# Patient Record
Sex: MALE | Race: WHITE | NOT HISPANIC OR LATINO | Employment: PART TIME | URBAN - METROPOLITAN AREA
[De-identification: names, ages, dates, MRNs, and addresses within clinical notes are randomized per-mention and may not be internally consistent; named-entity substitution may affect disease eponyms.]

---

## 2017-05-16 DIAGNOSIS — Z11.59 NEED FOR HEPATITIS C SCREENING TEST: Primary | ICD-10-CM

## 2017-05-17 ENCOUNTER — DOCUMENTATION ONLY (OUTPATIENT)
Dept: FAMILY MEDICINE | Facility: CLINIC | Age: 57
End: 2017-05-17

## 2017-05-17 NOTE — PROGRESS NOTES
Pre-Visit Chart Review  For Appointment Scheduled on 5/17/17.    Health Maintenance Due   Topic Date Due    Hepatitis C Screening  1960    TETANUS VACCINE  04/23/1977    Colonoscopy  04/23/2009    Lipid Panel  01/31/2014

## 2017-05-18 ENCOUNTER — OFFICE VISIT (OUTPATIENT)
Dept: FAMILY MEDICINE | Facility: CLINIC | Age: 57
End: 2017-05-18
Payer: MEDICAID

## 2017-05-18 ENCOUNTER — LAB VISIT (OUTPATIENT)
Dept: LAB | Facility: HOSPITAL | Age: 57
End: 2017-05-18
Attending: FAMILY MEDICINE
Payer: MEDICAID

## 2017-05-18 VITALS
SYSTOLIC BLOOD PRESSURE: 126 MMHG | RESPIRATION RATE: 18 BRPM | HEART RATE: 55 BPM | WEIGHT: 185.88 LBS | DIASTOLIC BLOOD PRESSURE: 79 MMHG | HEIGHT: 66 IN | TEMPERATURE: 98 F | BODY MASS INDEX: 29.87 KG/M2

## 2017-05-18 DIAGNOSIS — Z29.9 PREVENTIVE MEASURE: ICD-10-CM

## 2017-05-18 DIAGNOSIS — Z11.4 ENCOUNTER FOR SCREENING FOR HIV: ICD-10-CM

## 2017-05-18 DIAGNOSIS — I10 ESSENTIAL HYPERTENSION: ICD-10-CM

## 2017-05-18 DIAGNOSIS — E78.5 HYPERLIPIDEMIA, UNSPECIFIED HYPERLIPIDEMIA TYPE: ICD-10-CM

## 2017-05-18 DIAGNOSIS — I51.9 LV DYSFUNCTION: ICD-10-CM

## 2017-05-18 DIAGNOSIS — I25.10 CORONARY ARTERY DISEASE, ANGINA PRESENCE UNSPECIFIED, UNSPECIFIED VESSEL OR LESION TYPE, UNSPECIFIED WHETHER NATIVE OR TRANSPLANTED HEART: ICD-10-CM

## 2017-05-18 DIAGNOSIS — H61.21 RIGHT EAR IMPACTED CERUMEN: ICD-10-CM

## 2017-05-18 DIAGNOSIS — I25.10 CORONARY ARTERY DISEASE, ANGINA PRESENCE UNSPECIFIED, UNSPECIFIED VESSEL OR LESION TYPE, UNSPECIFIED WHETHER NATIVE OR TRANSPLANTED HEART: Primary | ICD-10-CM

## 2017-05-18 DIAGNOSIS — E78.5 HYPERLIPIDEMIA WITH TARGET LOW DENSITY LIPOPROTEIN (LDL) CHOLESTEROL LESS THAN 100 MG/DL: ICD-10-CM

## 2017-05-18 DIAGNOSIS — E66.9 OBESITY, CLASS I, BMI 30-34.9: ICD-10-CM

## 2017-05-18 DIAGNOSIS — Z11.59 NEED FOR HEPATITIS C SCREENING TEST: ICD-10-CM

## 2017-05-18 LAB
25(OH)D3+25(OH)D2 SERPL-MCNC: 16 NG/ML
ALBUMIN SERPL BCP-MCNC: 3.8 G/DL
ALP SERPL-CCNC: 61 U/L
ALT SERPL W/O P-5'-P-CCNC: 29 U/L
ANION GAP SERPL CALC-SCNC: 9 MMOL/L
AST SERPL-CCNC: 20 U/L
BASOPHILS # BLD AUTO: 0.05 K/UL
BASOPHILS NFR BLD: 0.6 %
BILIRUB SERPL-MCNC: 0.2 MG/DL
BUN SERPL-MCNC: 14 MG/DL
CALCIUM SERPL-MCNC: 9.4 MG/DL
CHLORIDE SERPL-SCNC: 103 MMOL/L
CHOLEST/HDLC SERPL: 10.8 {RATIO}
CO2 SERPL-SCNC: 26 MMOL/L
CREAT SERPL-MCNC: 0.8 MG/DL
DIFFERENTIAL METHOD: NORMAL
EOSINOPHIL # BLD AUTO: 0.5 K/UL
EOSINOPHIL NFR BLD: 6.1 %
ERYTHROCYTE [DISTWIDTH] IN BLOOD BY AUTOMATED COUNT: 13.9 %
EST. GFR  (AFRICAN AMERICAN): >60 ML/MIN/1.73 M^2
EST. GFR  (NON AFRICAN AMERICAN): >60 ML/MIN/1.73 M^2
GLUCOSE SERPL-MCNC: 109 MG/DL
HCT VFR BLD AUTO: 50 %
HCV AB SERPL QL IA: POSITIVE
HCV AB SERPL QL IA: POSITIVE
HDL/CHOLESTEROL RATIO: 9.3 %
HDLC SERPL-MCNC: 26 MG/DL
HDLC SERPL-MCNC: 281 MG/DL
HGB BLD-MCNC: 16.5 G/DL
HIV 1+2 AB+HIV1 P24 AG SERPL QL IA: NEGATIVE
LDLC SERPL CALC-MCNC: ABNORMAL MG/DL
LYMPHOCYTES # BLD AUTO: 2.8 K/UL
LYMPHOCYTES NFR BLD: 35 %
MCH RBC QN AUTO: 29 PG
MCHC RBC AUTO-ENTMCNC: 33 %
MCV RBC AUTO: 88 FL
MONOCYTES # BLD AUTO: 0.7 K/UL
MONOCYTES NFR BLD: 8.4 %
NEUTROPHILS # BLD AUTO: 3.9 K/UL
NEUTROPHILS NFR BLD: 49.6 %
NONHDLC SERPL-MCNC: 255 MG/DL
PLATELET # BLD AUTO: 284 K/UL
PMV BLD AUTO: 10.2 FL
POTASSIUM SERPL-SCNC: 4.5 MMOL/L
PROT SERPL-MCNC: 7.6 G/DL
RBC # BLD AUTO: 5.69 M/UL
SODIUM SERPL-SCNC: 138 MMOL/L
TRIGL SERPL-MCNC: 904 MG/DL
TSH SERPL DL<=0.005 MIU/L-ACNC: 1.49 UIU/ML
WBC # BLD AUTO: 7.89 K/UL

## 2017-05-18 PROCEDURE — 83036 HEMOGLOBIN GLYCOSYLATED A1C: CPT

## 2017-05-18 PROCEDURE — 82306 VITAMIN D 25 HYDROXY: CPT

## 2017-05-18 PROCEDURE — 80061 LIPID PANEL: CPT

## 2017-05-18 PROCEDURE — 93005 ELECTROCARDIOGRAM TRACING: CPT | Mod: PBBFAC,PO | Performed by: FAMILY MEDICINE

## 2017-05-18 PROCEDURE — 85025 COMPLETE CBC W/AUTO DIFF WBC: CPT

## 2017-05-18 PROCEDURE — 80053 COMPREHEN METABOLIC PANEL: CPT

## 2017-05-18 PROCEDURE — 36415 COLL VENOUS BLD VENIPUNCTURE: CPT | Mod: PO

## 2017-05-18 PROCEDURE — 93010 ELECTROCARDIOGRAM REPORT: CPT | Mod: ,,, | Performed by: INTERNAL MEDICINE

## 2017-05-18 PROCEDURE — 86592 SYPHILIS TEST NON-TREP QUAL: CPT

## 2017-05-18 PROCEDURE — 69210 REMOVE IMPACTED EAR WAX UNI: CPT | Mod: S$PBB,,, | Performed by: FAMILY MEDICINE

## 2017-05-18 PROCEDURE — 86803 HEPATITIS C AB TEST: CPT

## 2017-05-18 PROCEDURE — 84443 ASSAY THYROID STIM HORMONE: CPT

## 2017-05-18 PROCEDURE — 99999 PR PBB SHADOW E&M-EST. PATIENT-LVL III: CPT | Mod: PBBFAC,,, | Performed by: FAMILY MEDICINE

## 2017-05-18 PROCEDURE — 99204 OFFICE O/P NEW MOD 45 MIN: CPT | Mod: 25,S$PBB,, | Performed by: FAMILY MEDICINE

## 2017-05-18 PROCEDURE — 86703 HIV-1/HIV-2 1 RESULT ANTBDY: CPT

## 2017-05-18 RX ORDER — POTASSIUM CHLORIDE 1.5 G/1.58G
20 POWDER, FOR SOLUTION ORAL ONCE
COMMUNITY
End: 2017-06-06

## 2017-05-18 RX ORDER — METOPROLOL SUCCINATE 25 MG/1
25 TABLET, EXTENDED RELEASE ORAL DAILY
COMMUNITY
End: 2017-06-06

## 2017-05-18 RX ORDER — LOVASTATIN 20 MG/1
40 TABLET ORAL NIGHTLY
Qty: 180 TABLET | Refills: 3 | Status: SHIPPED | OUTPATIENT
Start: 2017-05-18 | End: 2017-10-02 | Stop reason: ALTCHOICE

## 2017-05-18 RX ORDER — PREDNISONE 10 MG/1
10 TABLET ORAL DAILY
COMMUNITY
End: 2017-06-06

## 2017-05-18 RX ORDER — LISINOPRIL 5 MG/1
5 TABLET ORAL DAILY
Qty: 90 TABLET | Refills: 3 | Status: SHIPPED | OUTPATIENT
Start: 2017-05-18 | End: 2018-05-16 | Stop reason: SDUPTHER

## 2017-05-18 RX ORDER — FUROSEMIDE 40 MG/1
40 TABLET ORAL DAILY
COMMUNITY
End: 2017-06-06

## 2017-05-18 NOTE — MR AVS SNAPSHOT
[facility]  [address]  [place] [address]  Phone: [phone]  Fax: [phone]                  Kal Dodd   2017 9:00 AM   Office Visit    Description:  Male : 1960   Provider:  Aaron Rios MD   Department:  [facility]           Reason for Visit     Establish Care           Diagnoses this Visit        Comments    Coronary artery disease, angina presence unspecified, unspecified vessel or lesion type, unspecified whether native or transplanted heart    -  Primary     Essential hypertension         Hyperlipidemia, unspecified hyperlipidemia type         Hyperlipidemia with target low density lipoprotein (LDL) cholesterol less than 100 mg/dL         Obesity, Class I, BMI 30-34.9         LV dysfunction         Preventive measure         Encounter for screening for HIV                To Do List           Future Appointments        Provider Department Dept Phone    2017 9:45 AM [facility] - [facility] [phone]    2017 10:20 AM Aaron Rios MD [facility] Medicine [phone]    10/2/2017 3:40 PM Keven White MD [facility] - Cardiology [phone]      Goals (5 Years of Data)     None       These Medications        Disp Refills Start End    lovastatin (MEVACOR) 20 MG tablet 180 tablet 3 2017    Take 2 tablets (40 mg total) by mouth every evening. - Oral    Pharmacy: [facility] [address] [place] [address]. Ph #: [phone]       lisinopril (PRINIVIL,ZESTRIL) 5 MG tablet 90 tablet 3 2017    Take 1 tablet (5 mg total) by mouth once daily. - Oral    Pharmacy: [facility] [address] [place] [address]. Ph #: [phone]         [facility]     [facility] Nurse Care Line - 24/ Assistance  Unless otherwise directed by your provider, please contact [facility], our nurse care line that is available for 24/7 assistance.      Registered nurses in the [facility] provide: appointment scheduling, clinical advisement, health education, and other advisory services.  Call: [phone] (toll free)               Medications           Message regarding Medications     Verify the changes and/or additions to your medication regime listed below are the same as discussed with your clinician today.  If any of these changes or additions are incorrect, please notify your healthcare provider.        START taking these NEW medications        Refills    lovastatin (MEVACOR) 20 MG tablet 3    Sig: Take 2 tablets (40 mg total) by mouth every evening.    Class: Normal    Route: Oral    lisinopril (PRINIVIL,ZESTRIL) 5 MG tablet 3    Sig: Take 1 tablet (5 mg total) by mouth once daily.    Class: Normal    Route: Oral      STOP taking these medications     hydrocodone-acetaminophen 10-325mg (NORCO)  mg Tab Take 1 tablet by mouth 3 (three) times daily as needed.    atorvastatin (LIPITOR) 80 MG tablet Take 1 tablet (80 mg total) by mouth once daily.    prasugrel (EFFIENT) 10 mg Tab Take 1 tablet (10 mg total) by mouth once daily.           Verify that the below list of medications is an accurate representation of the medications you are currently taking.  If none reported, the list may be blank. If incorrect, please contact your healthcare provider. Carry this list with you in case of emergency.           Current Medications     aspirin (ECOTRIN) 81 MG EC tablet Take 81 mg by mouth once daily.      furosemide (LASIX) 40 MG tablet Take 40 mg by mouth once daily.    metoprolol succinate (TOPROL-XL) 25 MG 24 hr tablet Take 25 mg by mouth once daily.    potassium chloride (KLOR-CON) 20 mEq Pack Take 20 mEq by mouth once.    predniSONE (DELTASONE) 10 MG tablet Take 10 mg by mouth once daily.    lisinopril (PRINIVIL,ZESTRIL) 5 MG tablet Take 1 tablet (5 mg total) by mouth once daily.    lovastatin (MEVACOR) 20 MG tablet Take 2 tablets (40 mg  "total) by mouth every evening.           Clinical Reference Information           Your Vitals Were     BP Pulse Temp Resp Height Weight    126/79 (BP Location: Right arm, Patient Position: Sitting, BP Method: Automatic) 55 97.7 °F (36.5 °C) (Oral) 18 5' 6" (1.676 m) 84.3 kg (185 lb 13.6 oz)    BMI                30 kg/m2          Blood Pressure          Most Recent Value    BP  126/79      Allergies as of 5/18/2017     No Known Allergies      Immunizations Administered on Date of Encounter - 5/18/2017     None      Orders Placed During Today's Visit      Normal Orders This Visit    Ambulatory referral to Cardiology     IN OFFICE EKG 12-LEAD (to [facility])     Future Labs/Procedures Expected by Expires    CBC auto differential  5/18/2017 7/17/2018    Comprehensive metabolic panel  5/18/2017 7/17/2018    Hemoglobin A1c  5/18/2017 7/17/2018    Hepatitis C antibody  5/18/2017 7/17/2018    HIV-1 and HIV-2 antibodies  5/18/2017 7/17/2018    Lipid panel  5/18/2017 7/17/2018    RPR  5/18/2017 7/17/2018    TSH  5/18/2017 7/17/2018    Vitamin D  5/18/2017 7/17/2018      Smoking Cessation     If you would like to quit smoking:   You may be eligible for free services if you are a [place] resident and started smoking cigarettes before September 1, 1988.  Call the Smoking Cessation Trust ([facility]) toll free at [phone]) [phone] or (897) [phone].   Call 1-800-QUIT-NOW if you do not meet the above criteria.   Contact us via email: [email or web address]   View our website for more information: [email or web address]        Language Assistance Services     ATTENTION: Language assistance services are available, free of charge. Please call [phone].      ATENCIÓN: Si habla fang, tiene a beach disposición servicios gratuitos de asistencia lingüística. Llame al [phone].     CHÚ Ý: N?u b?n nói Ti?ng Vi?t, có các d?ch v? h? tr? ngôn ng? mi?n phí dành cho b?n. G?i s? [phone].         [facility] complies " with applicable Federal civil rights laws and does not discriminate on the basis of race, color, national origin, age, disability, or sex.

## 2017-05-18 NOTE — PROGRESS NOTES
"[facility]  [facility] Note    Subjective:       Patient ID: Kal Dodd is a 57 y.o. male.    Chief Complaint: Establish Care    HPI57 y.o.male with current medical history of hypertension, hyperlipidemia, congestive heart failure, history of MI and stent placement, and coronary artery disease is here today to establish care.  Patient has not seen a primary care doctor in over 4 years.  Patient has lost follow with cardiologist in [place].  Patient has been taking medications that she has been prescribed from the emergency room.  Patient would like to reestablish himself and get back on the right medications.  No acute complaints at today's visit.  Review of Systems   Constitutional: Negative for chills, fatigue and fever.   HENT: Negative for congestion, ear pain, postnasal drip, sinus pressure, sneezing and sore throat.    Eyes: Negative for pain.   Respiratory: Negative for cough, shortness of breath and wheezing.    Cardiovascular: Negative for chest pain, palpitations and leg swelling.   Gastrointestinal: Negative for abdominal distention, abdominal pain, constipation, diarrhea, nausea and vomiting.   Genitourinary: Negative for difficulty urinating.   Musculoskeletal: Negative for back pain and myalgias.   Skin: Negative for rash.   Neurological: Negative for dizziness, seizures, syncope, weakness and numbness.   Psychiatric/Behavioral: Negative for sleep disturbance. The patient is not nervous/anxious.        Objective:      Vitals:    05/18/17 0850   BP: 126/79   BP Location: Right arm   Patient Position: Sitting   BP Method: Automatic   Pulse: (!) 55   Resp: 18   Temp: 97.7 °F (36.5 °C)   TempSrc: Oral   Weight: 84.3 kg (185 lb 13.6 oz)   Height: 5' 6" (1.676 m)  Comment: height verified     Body mass index is 30 kg/(m^2).  Physical Exam   Constitutional: He is oriented to person, place, and time. He appears well-developed and well-nourished.   HENT:   Head: Normocephalic and atraumatic. "   Right ear cerumen impaction   Eyes: Conjunctivae and EOM are normal. Pupils are equal, round, and reactive to light.   Neck: Normal range of motion. Neck supple. No JVD present.   Cardiovascular: Normal rate, regular rhythm, normal heart sounds and intact distal pulses.  Exam reveals no gallop and no friction rub.    No murmur heard.  Pulmonary/Chest: Effort normal and breath sounds normal. No respiratory distress. He has no wheezes.   Abdominal: Soft. Bowel sounds are normal. There is no tenderness.   Musculoskeletal: Normal range of motion.   Neurological: He is alert and oriented to person, place, and time. No cranial nerve deficit.   Skin: Skin is warm and dry.   Psychiatric: He has a normal mood and affect. His behavior is normal. Judgment and thought content normal.   Nursing note and vitals reviewed.      Assessment:       1. Coronary artery disease, angina presence unspecified, unspecified vessel or lesion type, unspecified whether native or transplanted heart    2. Essential hypertension    3. Hyperlipidemia, unspecified hyperlipidemia type    4. Hyperlipidemia with target low density lipoprotein (LDL) cholesterol less than 100 mg/dL    5. Obesity, Class I, BMI 30-34.9    6. LV dysfunction, EF 40%    7. Preventive measure    8. Encounter for screening for HIV        Plan:       Coronary artery disease, angina presence unspecified, unspecified vessel or lesion type, unspecified whether native or transplanted heart  -     CBC auto differential; Future; Expected date: 5/18/17  -     Vitamin D; Future; Expected date: 5/18/17  -     Ambulatory referral to Cardiology    Essential hypertension  -     Comprehensive metabolic panel; Future; Expected date: 5/18/17  -     Hemoglobin A1c; Future; Expected date: 5/18/17  -     TSH; Future; Expected date: 5/18/17  -     IN OFFICE EKG 12-LEAD (to [facility])  -     lisinopril (PRINIVIL,ZESTRIL) 5 MG tablet; Take 1 tablet (5 mg total) by mouth once daily.  Dispense: 90 tablet;  Refill: 3    Hyperlipidemia, unspecified hyperlipidemia type  -     lovastatin (MEVACOR) 20 MG tablet; Take 2 tablets (40 mg total) by mouth every evening.  Dispense: 180 tablet; Refill: 3    Hyperlipidemia with target low density lipoprotein (LDL) cholesterol less than 100 mg/dL  -     Lipid panel; Future; Expected date: 5/18/17    Obesity, Class I, BMI 30-34.9        - Patient educated on diet and exercise     LV dysfunction, EF 40%        -  Stable continue to monitor     Preventive measure  -     Hepatitis C antibody; Future; Expected date: 5/18/17  -     RPR; Future; Expected date: 5/18/17    Encounter for screening for HIV  -     HIV-1 and HIV-2 antibodies; Future; Expected date: 5/18/17    Right ear cerumen impaction        - Water irrigation with hydrogen peroxide was performed on each ear followed up removal of ear wax manually with curette patient tolerated procedure well without any complications     Patient readiness: acceptance and barriers:none    During the course of the visit the patient was educated and counseled about the following:     Diabetes:  Discussed general issues about diabetes pathophysiology and management.  Educational material distributed.  Addressed ADA diet.  Suggested low cholesterol diet.  Encouraged aerobic exercise.  Discussed foot care.  Hypertension:   Dietary sodium restriction.  Regular aerobic exercise.  Check blood pressures daily and record.  Obesity:   General weight loss/lifestyle modification strategies discussed (elicit support from others; identify saboteurs; non-food rewards, etc).  Informal exercise measures discussed, e.g. taking stairs instead of elevator.  Regular aerobic exercise program discussed.    Goals: Diabetes: Maintain Hemoglobin A1C below 7, Hypertension: Reduce Blood Pressure and Obesity: Reduce calorie intake and BMI    Did patient meet goals/outcomes: Yes    The following self management tools provided: blood pressure log  blood glucose  log  excercise log    Patient Instructions (the written plan) was given to the patient/family.     Time spent with patient: 45 minutes    Return in about 1 year (around 5/18/2018).  Aaron Rios MD  [facility]  5/18/2017 9:42 AM

## 2017-05-19 LAB
ESTIMATED AVG GLUCOSE: 131 MG/DL
HBA1C MFR BLD HPLC: 6.2 %
RPR SER QL: NORMAL

## 2017-05-30 ENCOUNTER — DOCUMENTATION ONLY (OUTPATIENT)
Dept: FAMILY MEDICINE | Facility: CLINIC | Age: 57
End: 2017-05-30

## 2017-05-30 NOTE — PROGRESS NOTES
Pre-Visit Chart Review  For Appointment Scheduled on 06/06/2017    Health Maintenance Due   Topic Date Due    TETANUS VACCINE  04/23/1977    Colonoscopy  04/23/2009

## 2017-06-06 ENCOUNTER — OFFICE VISIT (OUTPATIENT)
Dept: FAMILY MEDICINE | Facility: CLINIC | Age: 57
End: 2017-06-06
Payer: MEDICAID

## 2017-06-06 ENCOUNTER — LAB VISIT (OUTPATIENT)
Dept: LAB | Facility: HOSPITAL | Age: 57
End: 2017-06-06
Attending: FAMILY MEDICINE
Payer: MEDICAID

## 2017-06-06 VITALS
WEIGHT: 181.69 LBS | RESPIRATION RATE: 18 BRPM | SYSTOLIC BLOOD PRESSURE: 142 MMHG | DIASTOLIC BLOOD PRESSURE: 78 MMHG | TEMPERATURE: 98 F | BODY MASS INDEX: 29.2 KG/M2 | HEART RATE: 57 BPM | HEIGHT: 66 IN

## 2017-06-06 DIAGNOSIS — R73.03 PREDIABETES: ICD-10-CM

## 2017-06-06 DIAGNOSIS — B19.20 HEPATITIS C VIRUS INFECTION WITHOUT HEPATIC COMA, UNSPECIFIED CHRONICITY: ICD-10-CM

## 2017-06-06 DIAGNOSIS — E78.1 HYPERTRIGLYCERIDEMIA, ESSENTIAL: ICD-10-CM

## 2017-06-06 DIAGNOSIS — E55.9 VITAMIN D DEFICIENCY: ICD-10-CM

## 2017-06-06 DIAGNOSIS — I51.9 LV DYSFUNCTION: Primary | ICD-10-CM

## 2017-06-06 PROCEDURE — 36415 COLL VENOUS BLD VENIPUNCTURE: CPT | Mod: PO

## 2017-06-06 PROCEDURE — 87522 HEPATITIS C REVRS TRNSCRPJ: CPT

## 2017-06-06 PROCEDURE — 99214 OFFICE O/P EST MOD 30 MIN: CPT | Mod: S$PBB,,, | Performed by: FAMILY MEDICINE

## 2017-06-06 PROCEDURE — 99999 PR PBB SHADOW E&M-EST. PATIENT-LVL IV: CPT | Mod: PBBFAC,,, | Performed by: FAMILY MEDICINE

## 2017-06-06 RX ORDER — FUROSEMIDE 20 MG/1
20 TABLET ORAL DAILY
Qty: 90 TABLET | Refills: 3 | Status: SHIPPED | OUTPATIENT
Start: 2017-06-06 | End: 2018-07-05 | Stop reason: SDUPTHER

## 2017-06-06 RX ORDER — ERGOCALCIFEROL 1.25 MG/1
50000 CAPSULE ORAL
Qty: 8 CAPSULE | Refills: 0 | Status: SHIPPED | OUTPATIENT
Start: 2017-06-06 | End: 2017-08-07 | Stop reason: SDUPTHER

## 2017-06-06 RX ORDER — FENOFIBRATE 160 MG/1
160 TABLET ORAL DAILY
Qty: 90 TABLET | Refills: 3 | Status: SHIPPED | OUTPATIENT
Start: 2017-06-06 | End: 2018-07-05 | Stop reason: SDUPTHER

## 2017-06-06 RX ORDER — METOPROLOL TARTRATE 25 MG/1
25 TABLET, FILM COATED ORAL 2 TIMES DAILY
Qty: 180 TABLET | Refills: 3 | Status: SHIPPED | OUTPATIENT
Start: 2017-06-06 | End: 2017-10-02 | Stop reason: ALTCHOICE

## 2017-06-06 NOTE — PROGRESS NOTES
[facility]  Progress Note    Subjective:       Patient ID: Kal Dodd is a 57 y.o. male.    Chief Complaint: CHF follow up    HPI57 y.o.male     Mr Dodd is a 58yo male with a PMH of CHF, heroin abuse, Hyperlipidemia, and hypertension presenting for a 3 week follow up for CHF management. Patient has only been taking statin and ace inhibitor since previous visit and has been nonadherent to prescriptions for furosemide and metoprolol. Patient complains of neck pain 4/10 today. Patient also had heroin addiction in part, and he expresses interest in saboxone prescription. He has been taking saboxone from friends and family. Patient has no further complaints at todays visit.      Review of Systems   Constitutional: Negative for activity change, appetite change, chills, fatigue, fever and unexpected weight change.   HENT: Negative for congestion, ear pain, postnasal drip, sinus pressure, sneezing and sore throat.    Eyes: Negative for pain and visual disturbance.   Respiratory: Negative for cough, shortness of breath and wheezing.    Cardiovascular: Negative for chest pain, palpitations and leg swelling.   Gastrointestinal: Negative for abdominal distention, abdominal pain, blood in stool, constipation, diarrhea, nausea and vomiting.   Endocrine: Negative for polydipsia and polyuria.   Genitourinary: Negative for difficulty urinating, dysuria and hematuria.   Musculoskeletal: Positive for neck pain. Negative for back pain and myalgias.   Skin: Negative for pallor and rash.   Neurological: Negative for dizziness, seizures, syncope, weakness and numbness.   Psychiatric/Behavioral: Negative for sleep disturbance. The patient is not nervous/anxious.        Objective:      Vitals:    06/06/17 1029 06/06/17 1033   BP: (!) 142/83 (!) 142/78   BP Location: Right arm Left arm   Patient Position: Sitting Sitting   BP Method: Automatic Automatic   Pulse: (!) 57    Resp: 18    Temp: 98 °F (36.7 °C)    TempSrc:  "Oral    Weight: 82.4 kg (181 lb 10.5 oz)    Height: 5' 6" (1.676 m)      Body mass index is 29.32 kg/m².  Physical Exam   Constitutional: He is oriented to person, place, and time. He appears well-developed and well-nourished.   HENT:   Head: Normocephalic and atraumatic.   Right Ear: External ear normal.   Left Ear: External ear normal.   Eyes: Conjunctivae and EOM are normal. Pupils are equal, round, and reactive to light. No scleral icterus.   Neck: Normal range of motion. Neck supple. No JVD present.   Cardiovascular: Normal rate, regular rhythm, normal heart sounds and intact distal pulses.  Exam reveals no gallop and no friction rub.    No murmur heard.  Pulmonary/Chest: Effort normal and breath sounds normal. No respiratory distress. He has no wheezes.   Abdominal: Soft. Bowel sounds are normal. There is no tenderness.   Musculoskeletal: Normal range of motion. He exhibits no edema, tenderness or deformity.   Neurological: He is alert and oriented to person, place, and time. No cranial nerve deficit.   Skin: Skin is warm and dry. No rash noted.   Psychiatric: He has a normal mood and affect. His behavior is normal. Judgment and thought content normal.   Nursing note and vitals reviewed.      Assessment:       1. LV dysfunction, EF 40%    2. Hepatitis C virus infection without hepatic coma, unspecified chronicity    3. Hypertriglyceridemia, essential    4. Vitamin D deficiency    5. Prediabetes        Plan:       LV dysfunction, EF 40%  -     2D Echo w/ Color Flow Doppler & Bubble Contrast; Future  -     metoprolol tartrate (LOPRESSOR) 25 MG tablet; Take 1 tablet (25 mg total) by mouth 2 (two) times daily.  Dispense: 180 tablet; Refill: 3  -     furosemide (LASIX) 20 MG tablet; Take 1 tablet (20 mg total) by mouth once daily.  Dispense: 90 tablet; Refill: 3  -     Ambulatory referral to Cardiology    Hepatitis C virus infection without hepatic coma, unspecified chronicity  -     HEPATITIS C RNA, QUANTITATIVE, " PCR; Future; Expected date: 06/06/2017  -     HEPATITIS C GENOTYPE; Future; Expected date: 06/06/2017  -     Ambulatory Referral to Hepatology    Hypertriglyceridemia, essential  -     fenofibrate 160 MG Tab; Take 1 tablet (160 mg total) by mouth once daily.  Dispense: 90 tablet; Refill: 3    Vitamin D deficiency  -     ergocalciferol (ERGOCALCIFEROL) 50,000 unit Cap; Take 1 capsule (50,000 Units total) by mouth every 7 days.  Dispense: 8 capsule; Refill: 0    Prediabetes        - Patient educated on diet and exercise       Patient readiness: acceptance and barriers:none    During the course of the visit the patient was educated and counseled about the following:     Hypertension:   Medication: no change.    Goals: Hypertension: Reduce Blood Pressure    Did patient meet goals/outcomes: Yes    The following self management tools provided: declined    Patient Instructions (the written plan) was given to the patient/family.     Time spent with patient: 30 minutes        Return in about 3 months (around 9/6/2017).  Aaron Rios MD  [facility]  6/6/2017 11:26 AM

## 2017-06-08 ENCOUNTER — TELEPHONE (OUTPATIENT)
Dept: HEPATOLOGY | Facility: CLINIC | Age: 57
End: 2017-06-08

## 2017-06-08 LAB
HCV GENTYP SERPL NAA+PROBE: NORMAL
HCV QUALITATIVE RESULT: NOT DETECTED
HCV QUANTITATIVE LOG: <1.08 LOG (10) IU/ML
HCV RNA SPEC NAA+PROBE-ACNC: <12 IU/ML

## 2017-06-08 NOTE — TELEPHONE ENCOUNTER
Chart reviewed. Positive HCV antibody on 5/18/17. HCV genotype done 6/6, result is pending.    Since active virus needs to be confirmed, will wait for pending labs before scheduling consult.   Left message to inform pt. Will call him back once results are available.

## 2017-06-16 ENCOUNTER — HOSPITAL ENCOUNTER (OUTPATIENT)
Dept: CARDIOLOGY | Facility: HOSPITAL | Age: 57
Discharge: HOME | End: 2017-06-16
Attending: FAMILY MEDICINE
Payer: MEDICAID

## 2017-06-16 DIAGNOSIS — I51.9 LV DYSFUNCTION: ICD-10-CM

## 2017-06-16 LAB
MITRAL VALVE REGURGITATION: ABNORMAL
RETIRED EF AND QEF - SEE NOTES: 32 (ref 55–65)
TRICUSPID VALVE REGURGITATION: ABNORMAL

## 2017-06-16 PROCEDURE — 63600175 PHARM REV CODE 636 W HCPCS: Performed by: INTERNAL MEDICINE

## 2017-06-16 PROCEDURE — 96374 THER/PROPH/DIAG INJ IV PUSH: CPT

## 2017-06-16 PROCEDURE — 93306 TTE W/DOPPLER COMPLETE: CPT | Mod: 26,,, | Performed by: INTERNAL MEDICINE

## 2017-06-22 ENCOUNTER — DOCUMENTATION ONLY (OUTPATIENT)
Dept: FAMILY MEDICINE | Facility: CLINIC | Age: 57
End: 2017-06-22

## 2017-06-22 NOTE — PROGRESS NOTES
Health Maintenance Due   Topic Date Due    TETANUS VACCINE  04/23/1977    Colonoscopy  04/23/2009

## 2017-07-25 ENCOUNTER — DOCUMENTATION ONLY (OUTPATIENT)
Dept: FAMILY MEDICINE | Facility: CLINIC | Age: 57
End: 2017-07-25

## 2017-07-25 NOTE — PROGRESS NOTES
Pre-Visit Chart Review  For Appointment Scheduled on 8/4/17.    Health Maintenance Due   Topic Date Due    TETANUS VACCINE  04/23/1977    Colonoscopy  04/23/2009

## 2017-08-04 ENCOUNTER — OFFICE VISIT (OUTPATIENT)
Dept: FAMILY MEDICINE | Facility: CLINIC | Age: 57
End: 2017-08-04
Payer: MEDICAID

## 2017-08-04 ENCOUNTER — TELEPHONE (OUTPATIENT)
Dept: FAMILY MEDICINE | Facility: CLINIC | Age: 57
End: 2017-08-04

## 2017-08-04 VITALS
TEMPERATURE: 98 F | SYSTOLIC BLOOD PRESSURE: 105 MMHG | OXYGEN SATURATION: 99 % | DIASTOLIC BLOOD PRESSURE: 63 MMHG | WEIGHT: 182.13 LBS | BODY MASS INDEX: 29.27 KG/M2 | HEART RATE: 53 BPM | HEIGHT: 66 IN

## 2017-08-04 DIAGNOSIS — R73.03 PREDIABETES: ICD-10-CM

## 2017-08-04 DIAGNOSIS — I50.22 CHRONIC SYSTOLIC CONGESTIVE HEART FAILURE: ICD-10-CM

## 2017-08-04 DIAGNOSIS — I25.10 CORONARY ARTERY DISEASE, ANGINA PRESENCE UNSPECIFIED, UNSPECIFIED VESSEL OR LESION TYPE, UNSPECIFIED WHETHER NATIVE OR TRANSPLANTED HEART: Primary | ICD-10-CM

## 2017-08-04 DIAGNOSIS — E78.5 HYPERLIPIDEMIA WITH TARGET LOW DENSITY LIPOPROTEIN (LDL) CHOLESTEROL LESS THAN 100 MG/DL: ICD-10-CM

## 2017-08-04 DIAGNOSIS — Z12.11 COLON CANCER SCREENING: ICD-10-CM

## 2017-08-04 DIAGNOSIS — I10 ESSENTIAL HYPERTENSION: ICD-10-CM

## 2017-08-04 PROCEDURE — 99999 PR PBB SHADOW E&M-EST. PATIENT-LVL III: CPT | Mod: PBBFAC,,, | Performed by: FAMILY MEDICINE

## 2017-08-04 PROCEDURE — 99214 OFFICE O/P EST MOD 30 MIN: CPT | Mod: S$PBB,,, | Performed by: FAMILY MEDICINE

## 2017-08-04 PROCEDURE — 3008F BODY MASS INDEX DOCD: CPT | Mod: ,,, | Performed by: FAMILY MEDICINE

## 2017-08-04 PROCEDURE — 99213 OFFICE O/P EST LOW 20 MIN: CPT | Mod: PBBFAC,PO | Performed by: FAMILY MEDICINE

## 2017-08-04 NOTE — TELEPHONE ENCOUNTER
Patient is scheduled to see  10/2017.  would like patient seen sooner by  or practitioner with sooner availablity. Please let us know so we can contact patient. Thanks!

## 2017-08-04 NOTE — PROGRESS NOTES
"[facility]  Progress Note    Subjective:       Patient ID: Kal Dodd is a 57 y.o. male.    Chief Complaint: hepatitis c test    HPI57 y.o.male is here today to follow-up abnormal labs.  Patient had positive hepatitis C antibody.  Viral load was not detected.  Patient was still concerned due to positive hepatitis C antibody.  Patient educated on hepatitis C testing.  No further workup required.  The patient has a history of congestive heart failure, hypertension, prediabetes, hyperlipidemia, hypertriglyceridemia, and vitamin D deficiency.  Patient has ejection fraction of 32%.  Patient had been noncompliant with medications due to cost.  Patient has been restarted on cost-effective medications on [employer] $5 plan.  Patient has not been established with cardiology.  No further complaints at today's visit.  Review of Systems   Constitutional: Negative for chills, fatigue and fever.   HENT: Negative for congestion, ear pain, postnasal drip, sinus pressure, sneezing and sore throat.    Eyes: Negative for pain.   Respiratory: Negative for cough, shortness of breath and wheezing.    Cardiovascular: Negative for chest pain, palpitations and leg swelling.   Gastrointestinal: Negative for abdominal distention, abdominal pain, constipation, diarrhea, nausea and vomiting.   Genitourinary: Negative for difficulty urinating.   Musculoskeletal: Negative for back pain and myalgias.   Skin: Negative for rash.   Neurological: Negative for dizziness, seizures, syncope, weakness and numbness.   Psychiatric/Behavioral: Negative for sleep disturbance. The patient is not nervous/anxious.        Objective:      Vitals:    08/04/17 1330   BP: 105/63   BP Location: Right arm   Patient Position: Sitting   BP Method: Automatic   Pulse: (!) 53   Temp: 98.2 °F (36.8 °C)   TempSrc: Oral   SpO2: 99%   Weight: 82.6 kg (182 lb 1.6 oz)   Height: 5' 6" (1.676 m)     Body mass index is 29.39 kg/m².  Physical Exam   Constitutional: He " is oriented to person, place, and time. He appears well-developed and well-nourished. No distress.   HENT:   Head: Normocephalic and atraumatic.   Cardiovascular: Normal rate, regular rhythm, normal heart sounds and intact distal pulses.  Exam reveals no gallop and no friction rub.    No murmur heard.  Pulmonary/Chest: Effort normal and breath sounds normal. No respiratory distress. He has no rales.   Abdominal: Soft. He exhibits no distension and no mass. There is no rebound and no guarding. No hernia.   Musculoskeletal: Normal range of motion.   Neurological: He is alert and oriented to person, place, and time.   Skin: Skin is warm.   Psychiatric: He has a normal mood and affect. His behavior is normal. Judgment and thought content normal.   Vitals reviewed.      Assessment:       1. Coronary artery disease, angina presence unspecified, unspecified vessel or lesion type, unspecified whether native or transplanted heart    2. Chronic systolic congestive heart failure    3. Colon cancer screening    4. Hyperlipidemia with target low density lipoprotein (LDL) cholesterol less than 100 mg/dL    5. Prediabetes    6. Essential hypertension        Plan:       Coronary artery disease, angina presence unspecified, unspecified vessel or lesion type, unspecified whether native or transplanted heart        -  Stable continue to monitor         - Follow up with cardiology     Chronic systolic congestive heart failure  -     Ambulatory referral to Cardiology  - Continue current medications     Colon cancer screening  -     Occult Blood Stool, CA Screen; Future; Expected date: 08/04/2017  -     Occult Blood Stool, CA Screen; Future; Expected date: 08/04/2017  -     Occult Blood Stool, CA Screen; Future; Expected date: 08/04/2017    Hyperlipidemia with target low density lipoprotein (LDL) cholesterol less than 100 mg/dL        - Continue fibrate and statin     Prediabetes        - Follow up HA1C in 3 months     Positive Hepatitis  C antibody        - hepatitis viral load not detected       - No need for further workup       - Continue to monitor   HTN        - Well controlled continue current medications    Patient readiness: acceptance and barriers:none    During the course of the visit the patient was educated and counseled about the following:     Hypertension:   Dietary sodium restriction.  Regular aerobic exercise.  Check blood pressures daily and record.  Obesity:   General weight loss/lifestyle modification strategies discussed (elicit support from others; identify saboteurs; non-food rewards, etc).    Goals: Hypertension: Reduce Blood Pressure and Obesity: Reduce calorie intake and BMI    Did patient meet goals/outcomes: Yes    The following self management tools provided: blood pressure log  excercise log    Patient Instructions (the written plan) was given to the patient/family.     Time spent with patient: 30 minutes    Return in about 3 months (around 11/4/2017).  Aaron Rios MD  [facility]  8/4/2017 2:02 PM

## 2017-08-07 DIAGNOSIS — E55.9 VITAMIN D DEFICIENCY: ICD-10-CM

## 2017-08-07 RX ORDER — ERGOCALCIFEROL 1.25 MG/1
CAPSULE ORAL
Qty: 8 CAPSULE | Refills: 0 | Status: SHIPPED | OUTPATIENT
Start: 2017-08-07 | End: 2017-11-10

## 2017-08-07 NOTE — TELEPHONE ENCOUNTER
Tried to schedule pt. Due to pts insurance, the next available with Dr Stevens is farther out than the currently scheduled Dr White appointment.   I have left pt scheduled as is with Dr White.

## 2017-10-02 ENCOUNTER — OFFICE VISIT (OUTPATIENT)
Dept: CARDIOLOGY | Facility: CLINIC | Age: 57
End: 2017-10-02
Payer: MEDICAID

## 2017-10-02 VITALS
HEIGHT: 66 IN | WEIGHT: 185.44 LBS | SYSTOLIC BLOOD PRESSURE: 124 MMHG | HEART RATE: 57 BPM | DIASTOLIC BLOOD PRESSURE: 74 MMHG | OXYGEN SATURATION: 98 % | BODY MASS INDEX: 29.8 KG/M2

## 2017-10-02 DIAGNOSIS — I51.9 LV DYSFUNCTION: ICD-10-CM

## 2017-10-02 DIAGNOSIS — I50.22 CHRONIC SYSTOLIC CONGESTIVE HEART FAILURE: Primary | ICD-10-CM

## 2017-10-02 DIAGNOSIS — I10 ESSENTIAL HYPERTENSION: ICD-10-CM

## 2017-10-02 DIAGNOSIS — E78.5 HYPERLIPIDEMIA WITH TARGET LOW DENSITY LIPOPROTEIN (LDL) CHOLESTEROL LESS THAN 100 MG/DL: ICD-10-CM

## 2017-10-02 DIAGNOSIS — F17.200 SMOKER: ICD-10-CM

## 2017-10-02 DIAGNOSIS — I25.10 CORONARY ARTERY DISEASE INVOLVING NATIVE CORONARY ARTERY OF NATIVE HEART WITHOUT ANGINA PECTORIS: ICD-10-CM

## 2017-10-02 DIAGNOSIS — I50.20 NYHA CLASS 2 HEART FAILURE WITH REDUCED EJECTION FRACTION: ICD-10-CM

## 2017-10-02 PROCEDURE — 99213 OFFICE O/P EST LOW 20 MIN: CPT | Mod: PBBFAC,PO | Performed by: INTERNAL MEDICINE

## 2017-10-02 PROCEDURE — 99999 PR PBB SHADOW E&M-EST. PATIENT-LVL III: CPT | Mod: PBBFAC,,, | Performed by: INTERNAL MEDICINE

## 2017-10-02 PROCEDURE — 99204 OFFICE O/P NEW MOD 45 MIN: CPT | Mod: S$PBB,,, | Performed by: INTERNAL MEDICINE

## 2017-10-02 RX ORDER — ATORVASTATIN CALCIUM 20 MG/1
20 TABLET, FILM COATED ORAL DAILY
Qty: 90 TABLET | Refills: 3 | Status: SHIPPED | OUTPATIENT
Start: 2017-10-02 | End: 2017-11-10

## 2017-10-02 RX ORDER — METOPROLOL SUCCINATE 25 MG/1
25 TABLET, EXTENDED RELEASE ORAL DAILY
Qty: 30 TABLET | Refills: 11 | Status: SHIPPED | OUTPATIENT
Start: 2017-10-02 | End: 2018-07-05 | Stop reason: SDUPTHER

## 2017-10-02 NOTE — PROGRESS NOTES
"[facility]    CC:   Chief Complaint   Patient presents with    Consult     Consult Dr Rios//[facility]//Former Dami pt LOV 01/2013       Patient ID: Kal Ddod is a 57 y.o. male with a past medical history of CAD s/p MI and JAVON to LAD (2012), Ischemic cardiomyopathy (EF 39%), HTN, HLD, smoking, who presents for an initial appointment.  Pertinent history/events are as follows:     -Followed by Dr. Lomax (2013) and Dr. Dr. Antony (2012) in [place], [place] in the past.  -Had massive anterior MI in 4/29/2012, [facility] with CPR, IABP, then 5 ION stents to the LAD.  -Per old chart review: [place] in 4/2012 and [facility] on 11/8/2012- Echo showed anteroapical hypokinesia with LVEF of 45% on 5/3/2012.  -Last echo on     HPI:  Mr. Dodd states he's been doing well.  He has no chest pain, LE edema, palpitations, TIA symptoms or syncope.  States he has SOB, which is chronic and unchanged.  Currently smokes 1 pack of cigarettes daily.  He reports not taking any medications from late 2013 up until May 2017 because he lost his medicaid.  Has been taking all medications as prescribed since May 2017.  Blood pressure 124/74 today.      Past Medical History:   Diagnosis Date    Coronary artery disease 4/26/12    "massive heart attack"    Heart attack     Hyperlipidemia     Hypertension      Past Surgical History:   Procedure Laterality Date    CORONARY STENT PLACEMENT  4/12     Social History     Social History    Marital status:      Spouse name: N/A    Number of children: N/A    Years of education: N/A     Occupational History    Not on file.     Social History Main Topics    Smoking status: Current Every Day Smoker     Packs/day: 1.00    Smokeless tobacco: Never Used    Alcohol use No    Drug use: No    Sexual activity: Not Currently     Other Topics Concern    Not on file     Social History Narrative    No narrative on file     Family History " "  Problem Relation Age of Onset    Hypertension Mother     Diabetes Mother     Diabetes Sister     Diabetes Daughter       at 20    Hypertension Maternal Aunt     Heart disease Maternal Uncle     Cancer Neg Hx        Review of patient's allergies indicates:  No Known Allergies    Medication List with Changes/Refills   Current Medications    ASPIRIN (ECOTRIN) 81 MG EC TABLET    Take 81 mg by mouth once daily.      BUPRENORPHINE-NALOXONE (SUBOXONE) 8-2 MG FILM    Place under the tongue once daily.    FENOFIBRATE 160 MG TAB    Take 1 tablet (160 mg total) by mouth once daily.    FUROSEMIDE (LASIX) 20 MG TABLET    Take 1 tablet (20 mg total) by mouth once daily.    LISINOPRIL (PRINIVIL,ZESTRIL) 5 MG TABLET    Take 1 tablet (5 mg total) by mouth once daily.    LOVASTATIN (MEVACOR) 20 MG TABLET    Take 2 tablets (40 mg total) by mouth every evening.    METOPROLOL TARTRATE (LOPRESSOR) 25 MG TABLET    Take 1 tablet (25 mg total) by mouth 2 (two) times daily.    VITAMIN D2 50,000 UNIT CAPSULE    TAKE ONE CAPSULE BY MOUTH EVERY 7 DAYS       Review of Systems  Constitution: Denies chills, fever, and sweats.  HENT: Denies headaches or blurry vision.  Cardiovascular: Denies chest pain or irregular heart beat.  Respiratory: Denies cough or shortness of breath.  Gastrointestinal: Denies abdominal pain, nausea, or vomiting.  Musculoskeletal: Denies muscle cramps.  Neurological: Denies dizziness or focal weakness.  Psychiatric/Behavioral: Normal mental status.  Hematologic/Lymphatic: Denies bleeding problem or easy bruising/bleeding.  Skin: Denies rash or suspicious lesions    Physical Examination  /74 (BP Location: Left arm, Patient Position: Sitting)   Pulse (!) 57   Ht 5' 6" (1.676 m)   Wt 84.1 kg (185 lb 6.5 oz)   SpO2 98%   BMI 29.93 kg/m²     Constitutional: No acute distress, conversant  HEENT: Sclera anicteric, Pupils equal, round and reactive to light, extraocular motions intact, Oropharynx " clear  Neck: No JVD, no carotid bruits  Cardiovascular: regular rate and rhythm, no murmur, rubs or gallops, normal S1/S2  Pulmonary: Clear to auscultation bilaterally  Abdominal: Abdomen soft, nontender, nondistended, positive bowel sounds  Extremities: No lower extremity edema,   Pulses:  Carotid pulses are 2+ on the right side, and 2+ on the left side.  Radial pulses are 2+ on the right side, and 2+ on the left side.   Femoral pulses are 2+ on the right side, and 2+ on the left side.  Skin: No ecchymosis, erythema, or ulcers  Psych: Alert and oriented x 3, appropriate affect  Neuro: CNII-XII intact, no focal deficits    Labs:  Most Recent Data  CBC:   Lab Results   Component Value Date    WBC 7.89 05/18/2017    HGB 16.5 05/18/2017    HCT 50.0 05/18/2017     05/18/2017    MCV 88 05/18/2017    RDW 13.9 05/18/2017     BMP:   Lab Results   Component Value Date     05/18/2017    K 4.5 05/18/2017     05/18/2017    CO2 26 05/18/2017    BUN 14 05/18/2017    CREATININE 0.8 05/18/2017     05/18/2017    CALCIUM 9.4 05/18/2017     LFTS;   Lab Results   Component Value Date    PROT 7.6 05/18/2017    ALBUMIN 3.8 05/18/2017    BILITOT 0.2 05/18/2017    AST 20 05/18/2017    ALKPHOS 61 05/18/2017    ALT 29 05/18/2017     COAGS: No results found for: INR, PROTIME, PTT  FLP:   Lab Results   Component Value Date    CHOL 281 (H) 05/18/2017    HDL 26 (L) 05/18/2017    LDLCALC Invalid, Trig>400.0 05/18/2017    TRIG 904 (H) 05/18/2017    CHOLHDL 9.3 (L) 05/18/2017       EKG 5/18/2017:  Sinus bradycardia  Cannot rule out Inferior infarct (cited on or before 18-MAY-2017)  Anterolateral infarct (cited on or before 18-MAY-2017)    Echo 6/16/2017:  CONCLUSIONS     1 - Moderately depressed left ventricular systolic function (EF 30-35%).     2 - Indeterminate LV diastolic function.     3 - Normal right ventricular systolic function .     4 - Very difficult windows, Optison contrast used for wall motion analysis..      5 - Evidence for prior transmural MI involving the mid-anterior, anterior apical and apical segments with akinetic thinned walls .    Holter 12/12/2012:  PREDOMINANT RHYTHM                                                           1. Sinus rhythm with heart rates varying between 46 and 102 bpm with an      average of 74 bpm.                                                                                                                                        VENTRICULAR ARRHYTHMIAS                                                      1. There were very rare PVCs totalling 28 and averaging 1 per hour.                                                                                       2. There were no episodes of ventricular tachycardia.                                                                                                     SUPRA VENTRICULAR ARRHYTHMIAS                                                1. There were very rare PACs recorded totalling 3 and averaging less than    1 per hour.                                                                                                                                               2. There were no episodes of sustained supraventricular tachycardia.                                                                                      SINUS NODE FUNCTION                                                          1. There was no evidence of high grade SA maryjane block.                                                                                                    AV CONDUCTION                                                                1. There was no evidence of high grade AV block.      Assessment/Plan:  Kal Dodd is a 57 y.o. male with a past medical history of CAD s/p MI and JAVON to LAD (2012), Ischemic cardiomyopathy (EF 39%), HTN, HLD, smoking, who presents for an initial appointment.     1. CAD s/p MI and JAVON to LAD (2012)- No symptoms  currently.  On statin, ASA, ACEI, beta blocker.  Continue to monitor.    2. Ischemic cardiomyopathy (EF 39%)- Pt is well compensated on exam.  NYHA class 2-3 symptoms.  Discontinue metoprolol tartrate and start metoprolol succinate 25 mg daily.  Echo prior to next visit.  Plan to start spironolactone at next visit.      3. HTN- Controlled.    4. HLD- Triglycerides 904.  Continue fenofibrate.  Discontinue lovastatin and start atorvastatin 20 mg daily.      Follow up in 1 month with lipids prior    Total duration of face to face visit time 40 minutes.  Total time spent counseling greater than fifty percent of total visit time.  Counseling included discussion regarding imaging findings, diagnosis, possibilities, treatment options, risks and benefits.  The patient had many questions regarding the options and long-term effects.    Keven White MD, PhD  Interventional Cardiology

## 2017-10-02 NOTE — LETTER
October 2, 2017      Aaron Rios MD  [address]  [place] [address]           [place] [facility] Cardiology  [address]  [place] [address]  Phone: [phone]          Patient: Kal Dodd   MR Number: [id number]   YOB: 1960   Date of Visit: 10/2/2017       Dear Dr. Aaron Rios:    Thank you for referring Kal Dodd to me for evaluation. Attached you will find relevant portions of my assessment and plan of care.    If you have questions, please do not hesitate to call me. I look forward to following Kal Dodd along with you.    Sincerely,    Keven White MD    Enclosure  CC:  No Recipients    If you would like to receive this communication electronically, please contact [email or web address] or [phone] to request more information on [employer] access.    For providers and/or their staff who would like to refer a patient to [facility], please contact us through our one-stop-shop provider referral line, [facility], at [phone].    If you feel you have received this communication in error or would no longer like to receive these types of communications, please e-mail [email or web address]

## 2017-10-02 NOTE — PATIENT INSTRUCTIONS
Assessment/Plan:  Kal Dodd is a 57 y.o. male with a past medical history of CAD s/p MI and JAVON to LAD (2012), Ischemic cardiomyopathy (EF 39%), HTN, HLD, smoking, who presents for an initial appointment.     1. CAD s/p MI and JAVON to LAD (2012)- No symptoms currently.  On statin, ASA, ACEI, beta blocker.  Continue to monitor.    2. Ischemic cardiomyopathy (EF 39%)- Pt is well compensated on exam.  NYHA class 2-3 symptoms.  Discontinue metoprolol tartrate and start metoprolol succinate 25 mg daily.  Echo prior to next visit.  Plan to start spironolactone at next visit.      3. HTN- Controlled.    4. HLD- Triglycerides 904.  Continue fenofibrate.  Discontinue lovastatin and start atorvastatin 20 mg daily.      Follow up in 1 month

## 2017-11-03 ENCOUNTER — DOCUMENTATION ONLY (OUTPATIENT)
Dept: FAMILY MEDICINE | Facility: CLINIC | Age: 57
End: 2017-11-03

## 2017-11-03 NOTE — PROGRESS NOTES
Pre-Visit Chart Review  For Appointment Scheduled on 11/10/17.    Health Maintenance Due   Topic Date Due    TETANUS VACCINE  04/23/1977    Colonoscopy  04/23/2009    Influenza Vaccine  08/01/2017

## 2017-11-07 DIAGNOSIS — Z12.11 COLON CANCER SCREENING: Primary | ICD-10-CM

## 2017-11-10 ENCOUNTER — OFFICE VISIT (OUTPATIENT)
Dept: FAMILY MEDICINE | Facility: CLINIC | Age: 57
End: 2017-11-10
Payer: MEDICAID

## 2017-11-10 VITALS
TEMPERATURE: 98 F | HEART RATE: 65 BPM | SYSTOLIC BLOOD PRESSURE: 115 MMHG | HEIGHT: 66 IN | RESPIRATION RATE: 18 BRPM | DIASTOLIC BLOOD PRESSURE: 72 MMHG | BODY MASS INDEX: 30.62 KG/M2 | WEIGHT: 190.5 LBS

## 2017-11-10 DIAGNOSIS — I10 ESSENTIAL HYPERTENSION: ICD-10-CM

## 2017-11-10 DIAGNOSIS — R73.03 PREDIABETES: Primary | ICD-10-CM

## 2017-11-10 DIAGNOSIS — E55.9 VITAMIN D DEFICIENCY: ICD-10-CM

## 2017-11-10 PROCEDURE — 99999 PR PBB SHADOW E&M-EST. PATIENT-LVL III: CPT | Mod: PBBFAC,,, | Performed by: FAMILY MEDICINE

## 2017-11-10 PROCEDURE — 99214 OFFICE O/P EST MOD 30 MIN: CPT | Mod: S$PBB,,, | Performed by: FAMILY MEDICINE

## 2017-11-10 PROCEDURE — 99213 OFFICE O/P EST LOW 20 MIN: CPT | Mod: PBBFAC,PO,25 | Performed by: FAMILY MEDICINE

## 2017-11-10 PROCEDURE — 90686 IIV4 VACC NO PRSV 0.5 ML IM: CPT | Mod: PBBFAC,PO

## 2017-11-10 RX ORDER — LOVASTATIN 20 MG/1
20 TABLET ORAL 2 TIMES DAILY
COMMUNITY
End: 2018-07-05 | Stop reason: SDUPTHER

## 2017-11-10 NOTE — PROGRESS NOTES
"[facility]  Progress Note    Subjective:       Patient ID: Kal Dodd is a 57 y.o. male.    Chief Complaint: PRE DM follow up     HPI57 y.o.male is here today for three-month follow-up visit.  Patient is due for repeat hemoglobin A1c and vitamin D.  Patient is due for flu vaccination today.  Patient is agreeable to complete fit kit for colon cancer screening.  Patient does not have any acute complaints at today's visit.  Review of Systems   Constitutional: Negative for chills, fatigue and fever.   HENT: Negative for congestion, ear pain, postnasal drip, sinus pressure, sneezing and sore throat.    Eyes: Negative for pain.   Respiratory: Negative for cough, shortness of breath and wheezing.    Cardiovascular: Negative for chest pain, palpitations and leg swelling.   Gastrointestinal: Negative for abdominal distention, abdominal pain, constipation, diarrhea, nausea and vomiting.   Genitourinary: Negative for difficulty urinating.   Musculoskeletal: Negative for back pain and myalgias.   Skin: Negative for rash.   Neurological: Negative for dizziness, seizures, syncope, weakness and numbness.   Psychiatric/Behavioral: Negative for sleep disturbance. The patient is not nervous/anxious.        Objective:      Vitals:    11/10/17 1341   BP: 115/72   BP Location: Right arm   Patient Position: Sitting   BP Method: Medium (Automatic)   Pulse: 65   Resp: 18   Temp: 97.5 °F (36.4 °C)   TempSrc: Oral   Weight: 86.4 kg (190 lb 7.6 oz)   Height: 5' 6" (1.676 m)     Body mass index is 30.74 kg/m².  Physical Exam   Constitutional: He is oriented to person, place, and time. He appears well-developed and well-nourished. No distress.   HENT:   Head: Normocephalic and atraumatic.   Cardiovascular: Normal rate, regular rhythm, normal heart sounds and intact distal pulses.  Exam reveals no gallop and no friction rub.    No murmur heard.  Pulmonary/Chest: Effort normal and breath sounds normal. No respiratory distress. He " has no rales.   Abdominal: Soft. He exhibits no distension and no mass. There is no rebound and no guarding. No hernia.   Musculoskeletal: Normal range of motion.   Neurological: He is alert and oriented to person, place, and time.   Skin: Skin is warm.   Psychiatric: He has a normal mood and affect. His behavior is normal. Judgment and thought content normal.   Vitals reviewed.      Assessment:       1. Prediabetes    2. Vitamin D deficiency    3. Essential hypertension        Plan:       Prediabetes  -     Hemoglobin A1c; Future; Expected date: 11/10/2017    Vitamin D deficiency  -     Vitamin D; Future; Expected date: 11/10/2017    Essential hypertension        - Well controlled continue current medications      Return in about 3 months (around 2/10/2018).  Aaron Rios MD  [facility]  11/10/2017 2:13 PM

## 2017-11-10 NOTE — PROGRESS NOTES
ID patient by name and date of birth.  Allergies confirmed.  Immunization given as per orders , using aseptic technique.  Patient tolerated well,2/10 pain scale; no bleeding at insertion site; Information sheet reviewed and given to patient; no adverse reactions noted.

## 2017-12-08 ENCOUNTER — TELEPHONE (OUTPATIENT)
Dept: CARDIOLOGY | Facility: CLINIC | Age: 57
End: 2017-12-08

## 2017-12-08 NOTE — TELEPHONE ENCOUNTER
Called pt and offered several options for reschedule due to Dr White no longer seeing pts at this office. Pt chose to transfer care to Dr Stevens. Pt verbalized OK with date and time. Letter also mailed out. No further issues discussed.

## 2018-02-05 DIAGNOSIS — I10 HYPERTENSION, UNSPECIFIED TYPE: Primary | ICD-10-CM

## 2018-02-12 ENCOUNTER — OFFICE VISIT (OUTPATIENT)
Dept: FAMILY MEDICINE | Facility: CLINIC | Age: 58
End: 2018-02-12
Payer: MEDICAID

## 2018-02-12 VITALS
DIASTOLIC BLOOD PRESSURE: 73 MMHG | HEART RATE: 56 BPM | BODY MASS INDEX: 30.65 KG/M2 | WEIGHT: 190.69 LBS | HEIGHT: 66 IN | SYSTOLIC BLOOD PRESSURE: 130 MMHG | TEMPERATURE: 97 F

## 2018-02-12 DIAGNOSIS — I25.10 CORONARY ARTERY DISEASE INVOLVING NATIVE CORONARY ARTERY OF NATIVE HEART WITHOUT ANGINA PECTORIS: Primary | ICD-10-CM

## 2018-02-12 DIAGNOSIS — E78.5 HYPERLIPIDEMIA WITH TARGET LOW DENSITY LIPOPROTEIN (LDL) CHOLESTEROL LESS THAN 100 MG/DL: ICD-10-CM

## 2018-02-12 DIAGNOSIS — F33.0 MILD EPISODE OF RECURRENT MAJOR DEPRESSIVE DISORDER: ICD-10-CM

## 2018-02-12 PROCEDURE — 99999 PR PBB SHADOW E&M-EST. PATIENT-LVL III: CPT | Mod: PBBFAC,,, | Performed by: FAMILY MEDICINE

## 2018-02-12 PROCEDURE — 3008F BODY MASS INDEX DOCD: CPT | Mod: ,,, | Performed by: FAMILY MEDICINE

## 2018-02-12 PROCEDURE — 99213 OFFICE O/P EST LOW 20 MIN: CPT | Mod: PBBFAC,PO | Performed by: FAMILY MEDICINE

## 2018-02-12 PROCEDURE — 99214 OFFICE O/P EST MOD 30 MIN: CPT | Mod: S$PBB,,, | Performed by: FAMILY MEDICINE

## 2018-02-12 RX ORDER — DULOXETIN HYDROCHLORIDE 30 MG/1
30 CAPSULE, DELAYED RELEASE ORAL DAILY
Qty: 30 CAPSULE | Refills: 1 | Status: SHIPPED | OUTPATIENT
Start: 2018-02-12 | End: 2018-09-28 | Stop reason: SDUPTHER

## 2018-02-12 RX ORDER — NITROGLYCERIN 0.4 MG/1
0.4 TABLET SUBLINGUAL EVERY 5 MIN PRN
Qty: 30 TABLET | Refills: 1 | Status: SHIPPED | OUTPATIENT
Start: 2018-02-12 | End: 2019-05-31 | Stop reason: SDUPTHER

## 2018-02-12 NOTE — PATIENT INSTRUCTIONS
Established High Blood Pressure    High blood pressure (hypertension) is a chronic disease. Often, healthcare providers dont know what causes it. But it can be caused by certain health conditions and medicines.  If you have high blood pressure, you may not have any symptoms. If you do have symptoms, they may include headache, dizziness, changes in your vision, chest pain, and shortness of breath. But even without symptoms, high blood pressure thats not treated raises your risk for heart attack and stroke. High blood pressure is a serious health risk and shouldnt be ignored.  A blood pressure reading is made up of two numbers: a higher number over a lower number. The top number is the systolic pressure. The bottom number is the diastolic pressure. A normal blood pressure is a systolic pressure of  less than 120 over a diastolic pressure of less than 80. You will see your blood pressure readings written together. For example, a person with a systolic pressure of 188 and a diastolic pressure of 78 will have 118/78 written in the medical record.  High blood pressure is when either the top number is 140 or higher, or the bottom number is 90 or higher. This must be the result when taking your blood pressure a number of times. The blood pressures between normal and high are called prehypertension.  Home care  If you have high blood pressure, you should do what is listed below to lower your blood pressure. If you are taking medicines for high blood pressure, these methods may reduce or end your need for medicines in the future.  · Begin a weight-loss program if you are overweight.  · Cut back on how much salt you get in your diet. Heres how to do this:  ¨ Dont eat foods that have a lot of salt. These include olives, pickles, smoked meats, and salted potato chips.  ¨ Dont add salt to your food at the table.  ¨ Use only small amounts of salt when cooking.  · Start an exercise program. Talk with your healthcare  provider about the type of exercise program that would be best for you. It doesn't have to be hard. Even brisk walking for 20 minutes 3 times a week is a good form of exercise.  · Dont take medicines that stimulate the heart. This includes many over-the-counter cold and sinus decongestant pills and sprays, as well as diet pills. Check the warnings about hypertension on the label. Before buying any over-the-counter medicines or supplements, always ask the pharmacist about the product's potential interaction with your high blood pressure and your high blood pressure medicines.  · Stimulants such as amphetamine or cocaine could be deadly for someone with high blood pressure. Never take these.  · Limit how much caffeine you get in your diet. Switch to caffeine-free products.  · Stop smoking. If you are a long-time smoker, this can be hard. Talk to your healthcare provider about medicines and nicotine replacement options to help you. Also, enroll in a stop-smoking program to make it more likely that you will quit for good.  · Learn how to handle stress. This is an important part of any program to lower blood pressure. Learn about relaxation methods like meditation, yoga, or biofeedback.  · If your provider prescribed medicines, take them exactly as directed. Missing doses may cause your blood pressure get out of control.  · If you miss a dose or doses, check with your healthcare provider or pharmacist about what to do.  · Consider buying an automatic blood pressure machine. Ask your provider for a recommendation. You can get one of these at most pharmacies.     The American Heart Association recommends the following guidelines for home blood pressure monitoring:  · Don't smoke or drink coffee for 30 minutes before taking your blood pressure.  · Go to the bathroom before the test.  · Relax for 5 minutes before taking the measurement.  · Sit with your back supported (don't sit on a couch or soft chair); keep your feet on  the floor uncrossed. Place your arm on a solid flat surface (like a table) with the upper part of the arm at heart level. Place the middle of the cuff directly above the eye of the elbow. Check the monitor's instruction manual for an illustration.  · Take multiple readings. When you measure, take 2 to 3 readings one minute apart and record all of the results.  · Take your blood pressure at the same time every day, or as your healthcare provider recommends.  · Record the date, time, and blood pressure reading.  · Take the record with you to your next medical appointment. If your blood pressure monitor has a built-in memory, simply take the monitor with you to your next appointment.  · Call your provider if you have several high readings. Don't be frightened by a single high blood pressure reading, but if you get several high readings, check in with your healthcare provider.  · Note: When blood pressure reaches a systolic (top number) of 180 or higher OR diastolic (bottom number) of 110 or higher, seek emergency medical treatment.  Follow-up care  You will need to see your healthcare provider regularly. This is to check your blood pressure and to make changes to your medicines. Make a follow-up appointment as directed. Bring the record of your home blood pressure readings to the appointment.  When to seek medical advice  Call your healthcare provider right away if any of these occur:  · Blood pressure reaches a systolic (upper number) of 180 or higher OR a diastolic (bottom number) of 110 or higher  · Chest pain or shortness of breath  · Severe headache  · Throbbing or rushing sound in the ears  · Nosebleed  · Sudden severe pain in your belly (abdomen)  · Extreme drowsiness, confusion, or fainting  · Dizziness or spinning sensation (vertigo)  · Weakness of an arm or leg or one side of the face  · You have problems speaking or seeing   Date Last Reviewed: 12/1/2016  © 7473-3919 [employer]. [address] [place] [address]  [place] [address]. All rights reserved. This information is not intended as a substitute for professional medical care. Always follow your healthcare professional's instructions.

## 2018-02-12 NOTE — PROGRESS NOTES
[facility]  Progress Note    Subjective:       Patient ID: Kal Dodd is a 57 y.o. male.    Chief Complaint: Low Mood    HPI57 y.o.male here with worsening low mood over the past 3 weeks. Patient reports decreased interest in activities, decreased concentration, fatigue, sleeping difficulties (trouble falling asleep, early morning waking) and decreased energy. He denies any recent life stressors, appetite/weight changes, psychomotor agitation/retadation or periods of elevated mood. He also denies any passive or active suicidal ideations.        Review of Systems   Constitutional: Positive for activity change and fatigue. Negative for appetite change, chills, diaphoresis, fever and unexpected weight change.   HENT: Negative for congestion, rhinorrhea, sinus pain, sinus pressure and sore throat.    Eyes: Negative for photophobia and visual disturbance.   Respiratory: Negative for cough, chest tightness and shortness of breath.    Cardiovascular: Negative for chest pain, palpitations and leg swelling.   Gastrointestinal: Negative for abdominal pain, blood in stool, constipation, diarrhea, nausea and vomiting.   Endocrine: Negative for polydipsia, polyphagia and polyuria.   Genitourinary: Negative for difficulty urinating and dysuria.   Musculoskeletal: Negative for arthralgias, back pain, gait problem and joint swelling.   Skin: Negative for color change and pallor.   Allergic/Immunologic: Negative for immunocompromised state.   Neurological: Negative for dizziness, light-headedness, numbness and headaches.   Hematological: Negative for adenopathy.   Psychiatric/Behavioral: Positive for decreased concentration, dysphoric mood and sleep disturbance. Negative for agitation and suicidal ideas. The patient is not nervous/anxious and is not hyperactive.        Objective:      Vitals:    02/12/18 0851   BP: 130/73   BP Location: Right arm   Patient Position: Sitting   BP Method: Medium (Automatic)   Pulse:  "(!) 56   Temp: 97.4 °F (36.3 °C)   TempSrc: Oral   Weight: 86.5 kg (190 lb 11.2 oz)   Height: 5' 6" (1.676 m)     Body mass index is 30.78 kg/m².  Physical Exam   Constitutional: He is oriented to person, place, and time. He appears well-developed and well-nourished.   HENT:   Head: Normocephalic and atraumatic.   Mouth/Throat: No oropharyngeal exudate.   Eyes: Pupils are equal, round, and reactive to light. Right eye exhibits no discharge. Left eye exhibits no discharge. No scleral icterus.   Neck: Normal range of motion. Neck supple. No thyromegaly present.   Cardiovascular: Normal rate, regular rhythm and normal heart sounds.  Exam reveals no gallop and no friction rub.    No murmur heard.  Pulmonary/Chest: Effort normal and breath sounds normal. No respiratory distress. He has no wheezes. He has no rales.   Abdominal: Soft. Bowel sounds are normal. He exhibits no distension. There is no tenderness.   Musculoskeletal: Normal range of motion. He exhibits no edema or deformity.   Neurological: He is alert and oriented to person, place, and time.   Skin: Skin is warm and dry.   Psychiatric: His behavior is normal. Judgment and thought content normal.   dysphoric mood with appropriate affect        Assessment:       1. Coronary artery disease involving native coronary artery of native heart without angina pectoris    2. Mild episode of recurrent major depressive disorder        Plan:       Coronary artery disease involving native coronary artery of native heart without angina pectoris  -     nitroGLYCERIN (NITROSTAT) 0.4 MG SL tablet; Place 1 tablet (0.4 mg total) under the tongue every 5 (five) minutes as needed for Chest pain.  Dispense: 30 tablet; Refill: 1   Continue Metoprolol Succinate 25 MG daily      Mild episode of recurrent major depressive disorder  -     DULoxetine (CYMBALTA) 30 MG capsule; Take 1 capsule (30 mg total) by mouth once daily.  Dispense: 30 capsule; Refill: 1    Hyperlipidemia with target low " density lipoprotein (LDL) cholesterol less than 100 mg/dL   Continue Lovastatin 20 MG BID   Future Lipid Panel     Follow-up in about 3 weeks (around 3/5/2018).  Aaron Rios MD  [facility]  2/12/2018 9:13 AM

## 2018-02-23 ENCOUNTER — DOCUMENTATION ONLY (OUTPATIENT)
Dept: FAMILY MEDICINE | Facility: CLINIC | Age: 58
End: 2018-02-23

## 2018-02-23 NOTE — PROGRESS NOTES
Pre-Visit Chart Review  For Appointment Scheduled on 02/26/2018    Health Maintenance Due   Topic Date Due    Fecal Occult Blood Test (FOBT)/FitKit  1960    TETANUS VACCINE  04/23/1977

## 2018-05-16 DIAGNOSIS — I10 ESSENTIAL HYPERTENSION: ICD-10-CM

## 2018-05-17 RX ORDER — LISINOPRIL 5 MG/1
TABLET ORAL
Qty: 90 TABLET | Refills: 0 | Status: SHIPPED | OUTPATIENT
Start: 2018-05-17 | End: 2018-08-22 | Stop reason: SDUPTHER

## 2018-07-05 DIAGNOSIS — I51.9 LV DYSFUNCTION: ICD-10-CM

## 2018-07-05 DIAGNOSIS — E78.1 HYPERTRIGLYCERIDEMIA, ESSENTIAL: ICD-10-CM

## 2018-07-06 RX ORDER — METOPROLOL SUCCINATE 25 MG/1
25 TABLET, EXTENDED RELEASE ORAL DAILY
Qty: 90 TABLET | Refills: 0 | Status: SHIPPED | OUTPATIENT
Start: 2018-07-06 | End: 2018-09-28 | Stop reason: SDUPTHER

## 2018-07-06 RX ORDER — FUROSEMIDE 20 MG/1
20 TABLET ORAL DAILY
Qty: 90 TABLET | Refills: 0 | Status: SHIPPED | OUTPATIENT
Start: 2018-07-06 | End: 2018-09-28 | Stop reason: SDUPTHER

## 2018-07-06 RX ORDER — LOVASTATIN 20 MG/1
20 TABLET ORAL 2 TIMES DAILY
Qty: 180 TABLET | Refills: 0 | Status: SHIPPED | OUTPATIENT
Start: 2018-07-06 | End: 2018-09-28 | Stop reason: SDUPTHER

## 2018-07-06 RX ORDER — FENOFIBRATE 160 MG/1
160 TABLET ORAL DAILY
Qty: 90 TABLET | Refills: 0 | Status: SHIPPED | OUTPATIENT
Start: 2018-07-06 | End: 2019-04-24 | Stop reason: SDUPTHER

## 2018-08-22 DIAGNOSIS — I10 ESSENTIAL HYPERTENSION: ICD-10-CM

## 2018-08-22 DIAGNOSIS — E78.5 HYPERLIPIDEMIA WITH TARGET LOW DENSITY LIPOPROTEIN (LDL) CHOLESTEROL LESS THAN 100 MG/DL: Primary | ICD-10-CM

## 2018-08-30 RX ORDER — LISINOPRIL 5 MG/1
5 TABLET ORAL DAILY
Qty: 90 TABLET | Refills: 0 | Status: SHIPPED | OUTPATIENT
Start: 2018-08-30 | End: 2018-09-28 | Stop reason: SDUPTHER

## 2018-08-30 NOTE — PROGRESS NOTES
Refill Authorization Note     is requesting a refill authorization.    Brief assessment and rationale for refill: APPROVE; needs labs/has appt sched with new pcp  Amount/Quantity of medication ordered: 90d        Refills Authorized: Yes  If authorized number of refills: 0        Medication-related problems identified: Requires labs  Medication Therapy Plan: Labs WNL; BP controlled; needs labs (lipids/k/scr/na) will pend,NTBS; approve 3 more  Name and strength of medication: lisinopril (PRINIVIL,ZESTRIL) 5 MG tablet  How patient will take medication: t1t qd     Comments:   BP Readings from Last 3 Encounters:   02/12/18 130/73   11/10/17 115/72   10/02/17 124/74     Lab Results   Component Value Date    CREATININE 0.8 05/18/2017    BUN 14 05/18/2017     05/18/2017    K 4.5 05/18/2017     05/18/2017    CO2 26 05/18/2017

## 2018-08-30 NOTE — TELEPHONE ENCOUNTER
Please schedule patient for labs (SCr/K/Na/lipids)  pt has scheduled appt with Jos but I do not see the appt w/ Alex per note. Thanks

## 2018-08-30 NOTE — TELEPHONE ENCOUNTER
----- Message from Cande Nugent sent at 8/27/2018  1:03 PM CDT -----  Contact: self  Patient [phone] was a patient of Dr Rios/has scheduled an appt with Dr Johnson for Friday 09 28 18 at 1:40pm (first available appt with anyone)/he is needing Lisinopril 5mg - takes one tab daily/Furosemide 20mg - takes one tab daily/Fenofibrate 160mg  -  Takes one tab daily/Metropolol ER 25mg tablets - takes one tab daily/ Lovastatin 20mg - takes one tablet by mouth twice daily/     [facility] - [address] [place] [address]  [place] [id number]  Phone: [phone] Fax: [phone]

## 2018-09-19 ENCOUNTER — DOCUMENTATION ONLY (OUTPATIENT)
Dept: FAMILY MEDICINE | Facility: CLINIC | Age: 58
End: 2018-09-19

## 2018-09-19 NOTE — PROGRESS NOTES
Pre-Visit Chart Review  For Appointment Scheduled on 09/28/2018    Health Maintenance Due   Topic Date Due    Fecal Occult Blood Test (FOBT)/FitKit  1960    TETANUS VACCINE  04/23/1977    Lipid Panel  05/18/2018    Influenza Vaccine  08/01/2018

## 2018-09-28 ENCOUNTER — OFFICE VISIT (OUTPATIENT)
Dept: FAMILY MEDICINE | Facility: CLINIC | Age: 58
End: 2018-09-28
Payer: MEDICAID

## 2018-09-28 VITALS
TEMPERATURE: 98 F | SYSTOLIC BLOOD PRESSURE: 133 MMHG | HEART RATE: 67 BPM | HEIGHT: 66 IN | DIASTOLIC BLOOD PRESSURE: 78 MMHG | BODY MASS INDEX: 30.54 KG/M2 | WEIGHT: 190.06 LBS

## 2018-09-28 DIAGNOSIS — F33.0 MILD EPISODE OF RECURRENT MAJOR DEPRESSIVE DISORDER: ICD-10-CM

## 2018-09-28 DIAGNOSIS — I10 ESSENTIAL HYPERTENSION: Primary | ICD-10-CM

## 2018-09-28 DIAGNOSIS — I51.9 LV DYSFUNCTION: ICD-10-CM

## 2018-09-28 PROCEDURE — 99214 OFFICE O/P EST MOD 30 MIN: CPT | Mod: PBBFAC,PO | Performed by: PHYSICIAN ASSISTANT

## 2018-09-28 PROCEDURE — 99999 PR PBB SHADOW E&M-EST. PATIENT-LVL IV: CPT | Mod: PBBFAC,,, | Performed by: PHYSICIAN ASSISTANT

## 2018-09-28 PROCEDURE — 99214 OFFICE O/P EST MOD 30 MIN: CPT | Mod: S$PBB,,, | Performed by: PHYSICIAN ASSISTANT

## 2018-09-28 RX ORDER — LOVASTATIN 20 MG/1
20 TABLET ORAL DAILY
Qty: 90 TABLET | Refills: 1 | Status: SHIPPED | OUTPATIENT
Start: 2018-09-28 | End: 2019-05-31 | Stop reason: SDUPTHER

## 2018-09-28 RX ORDER — LISINOPRIL 5 MG/1
5 TABLET ORAL DAILY
Qty: 90 TABLET | Refills: 1 | Status: SHIPPED | OUTPATIENT
Start: 2018-09-28 | End: 2019-05-31

## 2018-09-28 RX ORDER — METOPROLOL SUCCINATE 25 MG/1
25 TABLET, EXTENDED RELEASE ORAL DAILY
Qty: 90 TABLET | Refills: 1 | Status: SHIPPED | OUTPATIENT
Start: 2018-09-28 | End: 2019-05-31

## 2018-09-28 RX ORDER — DULOXETIN HYDROCHLORIDE 30 MG/1
30 CAPSULE, DELAYED RELEASE ORAL DAILY
Qty: 90 CAPSULE | Refills: 1 | Status: SHIPPED | OUTPATIENT
Start: 2018-09-28 | End: 2019-05-31

## 2018-09-28 RX ORDER — FUROSEMIDE 20 MG/1
20 TABLET ORAL DAILY
Qty: 90 TABLET | Refills: 1 | Status: SHIPPED | OUTPATIENT
Start: 2018-09-28 | End: 2019-05-31

## 2018-09-28 NOTE — PROGRESS NOTES
Subjective:       Patient ID: Kal Dodd is a 58 y.o. male.    Chief Complaint: Medication Refill    Patient with hypertension, coronary artery disease, hyperlipidemia, and depression presents for routine follow-up.  All of patient's chronic medical conditions are currently stable and he has no acute complaints today.  Patient declines influenza vaccine.  Patient states that he has fit kit at home and he will return this when he comes in for lab.  He declined screening colonoscopy.  Patients patient medical/surgical, social and family histories have been reviewed         Review of Systems   Constitutional: Negative for activity change, appetite change, fatigue and unexpected weight change.   Respiratory: Negative for cough, chest tightness and shortness of breath.    Cardiovascular: Negative for chest pain, palpitations and leg swelling.   Gastrointestinal: Negative for abdominal pain, anal bleeding, blood in stool, constipation, diarrhea and nausea.   Endocrine: Negative for polydipsia and polyuria.   Genitourinary: Negative for difficulty urinating, frequency, hematuria and urgency.   Musculoskeletal: Negative for arthralgias.   Skin: Negative for rash.   Neurological: Negative for dizziness and headaches.   Psychiatric/Behavioral: Negative for dysphoric mood. The patient is not nervous/anxious.        Objective:      Physical Exam   Constitutional: He appears well-developed and well-nourished. He is cooperative. No distress.   Eyes: Conjunctivae and EOM are normal. No scleral icterus.   Neck: Carotid bruit is not present.   Cardiovascular: Normal rate, regular rhythm and normal heart sounds.   Pulmonary/Chest: Effort normal and breath sounds normal.   Abdominal: Soft. Bowel sounds are normal. There is no tenderness.   Musculoskeletal:        Right lower leg: He exhibits no edema.        Left lower leg: He exhibits no edema.   Neurological: He is alert.   Vitals reviewed.      Assessment:       1. Essential  hypertension    2. Mild episode of recurrent major depressive disorder    3. LV dysfunction, EF 40%        Plan:       Kal was seen today for medication refill.    Diagnoses and all orders for this visit:    Essential hypertension  -     lisinopril (PRINIVIL,ZESTRIL) 5 MG tablet; Take 1 tablet (5 mg total) by mouth once daily.  -     Comprehensive metabolic panel; Future  -     Lipid panel; Future  -     Urinalysis; Future  -     CBC auto differential; Future    metoprolol succinate (TOPROL-XL) 25 MG 24 hr tablet; Take 1 tablet (25 mg total) by mouth once daily.  -     lovastatin (MEVACOR) 20 MG tablet; Take 1 tablet (20 mg total) by mouth once daily.      Mild episode of recurrent major depressive disorder  -     DULoxetine (CYMBALTA) 30 MG capsule; Take 1 capsule (30 mg total) by mouth once daily.    LV dysfunction, EF 40%  -     furosemide (LASIX) 20 MG tablet; Take 1 tablet (20 mg total) by mouth once daily.    Other orders  -

## 2019-04-23 ENCOUNTER — LAB VISIT (OUTPATIENT)
Dept: LAB | Facility: HOSPITAL | Age: 59
End: 2019-04-23
Attending: PHYSICIAN ASSISTANT
Payer: MEDICAID

## 2019-04-23 DIAGNOSIS — I10 ESSENTIAL HYPERTENSION: ICD-10-CM

## 2019-04-23 LAB
ALBUMIN SERPL BCP-MCNC: 3.9 G/DL (ref 3.5–5.2)
ALP SERPL-CCNC: 59 U/L (ref 55–135)
ALT SERPL W/O P-5'-P-CCNC: 19 U/L (ref 10–44)
ANION GAP SERPL CALC-SCNC: 13 MMOL/L (ref 8–16)
ANISOCYTOSIS BLD QL SMEAR: SLIGHT
AST SERPL-CCNC: 16 U/L (ref 10–40)
BASOPHILS # BLD AUTO: 0.07 K/UL (ref 0–0.2)
BASOPHILS NFR BLD: 0.7 % (ref 0–1.9)
BILIRUB SERPL-MCNC: 0.3 MG/DL (ref 0.1–1)
BUN SERPL-MCNC: 17 MG/DL (ref 6–20)
CALCIUM SERPL-MCNC: 9.9 MG/DL (ref 8.7–10.5)
CHLORIDE SERPL-SCNC: 103 MMOL/L (ref 95–110)
CHOLEST SERPL-MCNC: 257 MG/DL (ref 120–199)
CHOLEST/HDLC SERPL: 9.9 {RATIO} (ref 2–5)
CO2 SERPL-SCNC: 22 MMOL/L (ref 23–29)
CREAT SERPL-MCNC: 0.8 MG/DL (ref 0.5–1.4)
DIFFERENTIAL METHOD: NORMAL
EOSINOPHIL # BLD AUTO: 0.3 K/UL (ref 0–0.5)
EOSINOPHIL NFR BLD: 3.2 % (ref 0–8)
ERYTHROCYTE [DISTWIDTH] IN BLOOD BY AUTOMATED COUNT: 12.9 % (ref 11.5–14.5)
EST. GFR  (AFRICAN AMERICAN): >60 ML/MIN/1.73 M^2
EST. GFR  (NON AFRICAN AMERICAN): >60 ML/MIN/1.73 M^2
GLUCOSE SERPL-MCNC: 110 MG/DL (ref 70–110)
HCT VFR BLD AUTO: 45 % (ref 40–54)
HDLC SERPL-MCNC: 26 MG/DL (ref 40–75)
HDLC SERPL: 10.1 % (ref 20–50)
HGB BLD-MCNC: 15.3 G/DL (ref 14–18)
IMM GRANULOCYTES # BLD AUTO: 0.04 K/UL (ref 0–0.04)
IMM GRANULOCYTES NFR BLD AUTO: 0.4 % (ref 0–0.5)
LDLC SERPL CALC-MCNC: ABNORMAL MG/DL (ref 63–159)
LYMPHOCYTES # BLD AUTO: 3.1 K/UL (ref 1–4.8)
LYMPHOCYTES NFR BLD: 29.8 % (ref 18–48)
MCH RBC QN AUTO: 29.4 PG (ref 27–31)
MCHC RBC AUTO-ENTMCNC: 34 G/DL (ref 32–36)
MCV RBC AUTO: 86 FL (ref 82–98)
MONOCYTES # BLD AUTO: 0.5 K/UL (ref 0.3–1)
MONOCYTES NFR BLD: 5.2 % (ref 4–15)
NEUTROPHILS # BLD AUTO: 6.2 K/UL (ref 1.8–7.7)
NEUTROPHILS NFR BLD: 60.7 % (ref 38–73)
NONHDLC SERPL-MCNC: 231 MG/DL
NRBC BLD-RTO: 0 /100 WBC
PLATELET # BLD AUTO: 301 K/UL (ref 150–350)
PLATELET BLD QL SMEAR: NORMAL
PMV BLD AUTO: 10.2 FL (ref 9.2–12.9)
POIKILOCYTOSIS BLD QL SMEAR: SLIGHT
POTASSIUM SERPL-SCNC: 4.1 MMOL/L (ref 3.5–5.1)
PROT SERPL-MCNC: 7.4 G/DL (ref 6–8.4)
RBC # BLD AUTO: 5.21 M/UL (ref 4.6–6.2)
SODIUM SERPL-SCNC: 138 MMOL/L (ref 136–145)
TRIGL SERPL-MCNC: 833 MG/DL (ref 30–150)
WBC # BLD AUTO: 10.25 K/UL (ref 3.9–12.7)

## 2019-04-23 PROCEDURE — 36415 COLL VENOUS BLD VENIPUNCTURE: CPT | Mod: PO

## 2019-04-23 PROCEDURE — 80061 LIPID PANEL: CPT

## 2019-04-23 PROCEDURE — 85025 COMPLETE CBC W/AUTO DIFF WBC: CPT

## 2019-04-23 PROCEDURE — 80053 COMPREHEN METABOLIC PANEL: CPT

## 2019-04-24 DIAGNOSIS — E78.1 HYPERTRIGLYCERIDEMIA, ESSENTIAL: ICD-10-CM

## 2019-04-24 RX ORDER — FENOFIBRATE 160 MG/1
160 TABLET ORAL DAILY
Qty: 90 TABLET | Refills: 0 | Status: SHIPPED | OUTPATIENT
Start: 2019-04-24 | End: 2019-05-31 | Stop reason: SDUPTHER

## 2019-04-25 ENCOUNTER — TELEPHONE (OUTPATIENT)
Dept: FAMILY MEDICINE | Facility: CLINIC | Age: 59
End: 2019-04-25

## 2019-05-21 ENCOUNTER — TELEPHONE (OUTPATIENT)
Dept: FAMILY MEDICINE | Facility: CLINIC | Age: 59
End: 2019-05-21

## 2019-05-21 NOTE — TELEPHONE ENCOUNTER
Called patient regarding appointment on Friday 5/31/19 with Dr. Reynoso, no answer left detail voice message for patient to call back to rescheduled appointment.

## 2019-05-31 ENCOUNTER — OFFICE VISIT (OUTPATIENT)
Dept: FAMILY MEDICINE | Facility: CLINIC | Age: 59
End: 2019-05-31
Payer: MEDICAID

## 2019-05-31 VITALS
WEIGHT: 187.63 LBS | SYSTOLIC BLOOD PRESSURE: 140 MMHG | BODY MASS INDEX: 30.16 KG/M2 | HEART RATE: 55 BPM | TEMPERATURE: 98 F | DIASTOLIC BLOOD PRESSURE: 74 MMHG | OXYGEN SATURATION: 96 % | HEIGHT: 66 IN

## 2019-05-31 DIAGNOSIS — I50.22 CHRONIC SYSTOLIC CONGESTIVE HEART FAILURE: ICD-10-CM

## 2019-05-31 DIAGNOSIS — I25.10 CORONARY ARTERY DISEASE INVOLVING NATIVE CORONARY ARTERY OF NATIVE HEART WITHOUT ANGINA PECTORIS: ICD-10-CM

## 2019-05-31 DIAGNOSIS — I10 ESSENTIAL HYPERTENSION: Primary | ICD-10-CM

## 2019-05-31 DIAGNOSIS — E66.9 OBESITY (BMI 30-39.9): ICD-10-CM

## 2019-05-31 DIAGNOSIS — E78.1 HYPERTRIGLYCERIDEMIA, ESSENTIAL: ICD-10-CM

## 2019-05-31 DIAGNOSIS — E78.5 HYPERLIPIDEMIA WITH TARGET LOW DENSITY LIPOPROTEIN (LDL) CHOLESTEROL LESS THAN 100 MG/DL: ICD-10-CM

## 2019-05-31 PROCEDURE — 99999 PR PBB SHADOW E&M-EST. PATIENT-LVL III: CPT | Mod: PBBFAC,,, | Performed by: NURSE PRACTITIONER

## 2019-05-31 PROCEDURE — 99214 PR OFFICE/OUTPT VISIT, EST, LEVL IV, 30-39 MIN: ICD-10-PCS | Mod: S$PBB,,, | Performed by: NURSE PRACTITIONER

## 2019-05-31 PROCEDURE — 99999 PR PBB SHADOW E&M-EST. PATIENT-LVL III: ICD-10-PCS | Mod: PBBFAC,,, | Performed by: NURSE PRACTITIONER

## 2019-05-31 PROCEDURE — 99214 OFFICE O/P EST MOD 30 MIN: CPT | Mod: S$PBB,,, | Performed by: NURSE PRACTITIONER

## 2019-05-31 PROCEDURE — 99213 OFFICE O/P EST LOW 20 MIN: CPT | Mod: PBBFAC,PO | Performed by: NURSE PRACTITIONER

## 2019-05-31 RX ORDER — LISINOPRIL 40 MG/1
40 TABLET ORAL DAILY
Qty: 90 TABLET | Refills: 3 | Status: SHIPPED | OUTPATIENT
Start: 2019-05-31 | End: 2020-08-10 | Stop reason: SDUPTHER

## 2019-05-31 RX ORDER — FUROSEMIDE 20 MG/1
20 TABLET ORAL 2 TIMES DAILY
Qty: 60 TABLET | Refills: 11 | Status: ON HOLD | OUTPATIENT
Start: 2019-05-31 | End: 2020-08-05 | Stop reason: HOSPADM

## 2019-05-31 RX ORDER — NITROGLYCERIN 0.4 MG/1
0.4 TABLET SUBLINGUAL EVERY 5 MIN PRN
Qty: 30 TABLET | Refills: 1 | Status: SHIPPED | OUTPATIENT
Start: 2019-05-31 | End: 2020-08-10 | Stop reason: SDUPTHER

## 2019-05-31 RX ORDER — LOVASTATIN 20 MG/1
20 TABLET ORAL DAILY
Qty: 90 TABLET | Refills: 1 | Status: SHIPPED | OUTPATIENT
Start: 2019-05-31 | End: 2020-01-06 | Stop reason: SDUPTHER

## 2019-05-31 RX ORDER — METOPROLOL SUCCINATE 25 MG/1
25 TABLET, EXTENDED RELEASE ORAL DAILY
Qty: 30 TABLET | Refills: 11 | Status: SHIPPED | OUTPATIENT
Start: 2019-05-31 | End: 2020-08-10 | Stop reason: SDUPTHER

## 2019-05-31 RX ORDER — FENOFIBRATE 160 MG/1
160 TABLET ORAL DAILY
Qty: 90 TABLET | Refills: 0 | Status: ON HOLD | OUTPATIENT
Start: 2019-05-31 | End: 2020-08-05 | Stop reason: HOSPADM

## 2019-05-31 NOTE — PROGRESS NOTES
"Subjective:       Patient ID: Kal Dodd is a 59 y.o. male.    Chief Complaint: follow up hypertension, hyperlipidemia    Hypertension   This is a chronic problem. The current episode started more than 1 year ago. The problem has been gradually worsening since onset. The problem is uncontrolled. Pertinent negatives include no blurred vision, chest pain, headaches, palpitations, peripheral edema or shortness of breath. There are no associated agents to hypertension. Risk factors for coronary artery disease include dyslipidemia, obesity, male gender and smoking/tobacco exposure.       Past Medical History:   Diagnosis Date    Coronary artery disease 4/26/12    "massive heart attack"    Heart attack     Hyperlipidemia     Hypertension        Review of patient's allergies indicates:  No Known Allergies      Current Outpatient Medications:     aspirin (ECOTRIN) 81 MG EC tablet, Take 81 mg by mouth once daily.  , Disp: , Rfl:     buprenorphine-naloxone (SUBOXONE) 8-2 mg Film, Place under the tongue once daily., Disp: , Rfl:     fenofibrate 160 MG Tab, Take 1 tablet (160 mg total) by mouth once daily., Disp: 90 tablet, Rfl: 0    lovastatin (MEVACOR) 20 MG tablet, Take 1 tablet (20 mg total) by mouth once daily., Disp: 90 tablet, Rfl: 1    furosemide (LASIX) 20 MG tablet, Take 1 tablet (20 mg total) by mouth 2 (two) times daily., Disp: 60 tablet, Rfl: 11    lisinopril (PRINIVIL,ZESTRIL) 40 MG tablet, Take 1 tablet (40 mg total) by mouth once daily., Disp: 90 tablet, Rfl: 3    metoprolol succinate (TOPROL-XL) 25 MG 24 hr tablet, Take 1 tablet (25 mg total) by mouth once daily., Disp: 30 tablet, Rfl: 11    nitroGLYCERIN (NITROSTAT) 0.4 MG SL tablet, Place 1 tablet (0.4 mg total) under the tongue every 5 (five) minutes as needed for Chest pain., Disp: 30 tablet, Rfl: 1    Review of Systems   Constitutional: Negative for unexpected weight change.   HENT: Negative for trouble swallowing.    Eyes: Negative for " "blurred vision and visual disturbance.   Respiratory: Negative for shortness of breath.    Cardiovascular: Negative for chest pain, palpitations and leg swelling.   Gastrointestinal: Negative for blood in stool.   Genitourinary: Negative for hematuria.   Skin: Negative for rash.   Allergic/Immunologic: Negative for immunocompromised state.   Neurological: Negative for headaches.   Hematological: Does not bruise/bleed easily.   Psychiatric/Behavioral: Negative for agitation. The patient is not nervous/anxious.        Objective:      BP (!) 140/74 (BP Location: Right arm, Patient Position: Sitting, BP Method: Medium (Manual))   Pulse (!) 55   Temp 97.7 °F (36.5 °C) (Oral)   Ht 5' 6" (1.676 m)   Wt 85.1 kg (187 lb 9.8 oz)   SpO2 96%   BMI 30.28 kg/m²   Physical Exam   Constitutional: He is oriented to person, place, and time. He appears well-developed and well-nourished.   Eyes: Pupils are equal, round, and reactive to light. Conjunctivae and EOM are normal.   Neck: Normal range of motion. Neck supple.   Cardiovascular: Normal rate, regular rhythm, normal heart sounds and intact distal pulses.   Pulmonary/Chest: Effort normal and breath sounds normal.   Abdominal: Soft. Bowel sounds are normal.   Musculoskeletal: Normal range of motion.   Neurological: He is alert and oriented to person, place, and time.   Skin: Skin is warm and dry.   Psychiatric: He has a normal mood and affect. His behavior is normal. Judgment and thought content normal.       Assessment:       1. Essential hypertension    2. Hypertriglyceridemia, essential    3. Coronary artery disease involving native coronary artery of native heart without angina pectoris    4. Hyperlipidemia with target low density lipoprotein (LDL) cholesterol less than 100 mg/dL    5. Chronic systolic congestive heart failure    6. Obesity (BMI 30-39.9)        Plan:       Essential hypertension  -     furosemide (LASIX) 20 MG tablet; Take 1 tablet (20 mg total) by mouth " 2 (two) times daily.  Dispense: 60 tablet; Refill: 11  -     lisinopril (PRINIVIL,ZESTRIL) 40 MG tablet; Take 1 tablet (40 mg total) by mouth once daily.  Dispense: 90 tablet; Refill: 3  Toe week BP diary  BP Readings from Last 3 Encounters:   05/31/19 (!) 140/74   09/28/18 133/78   02/12/18 130/73     Hypertriglyceridemia, essential  -     fenofibrate 160 MG Tab; Take 1 tablet (160 mg total) by mouth once daily.  Dispense: 90 tablet; Refill: 0    Coronary artery disease involving native coronary artery of native heart without angina pectoris  -     metoprolol succinate (TOPROL-XL) 25 MG 24 hr tablet; Take 1 tablet (25 mg total) by mouth once daily.  Dispense: 30 tablet; Refill: 11  -     nitroGLYCERIN (NITROSTAT) 0.4 MG SL tablet; Place 1 tablet (0.4 mg total) under the tongue every 5 (five) minutes as needed for Chest pain.  Dispense: 30 tablet; Refill: 1    Hyperlipidemia with target low density lipoprotein (LDL) cholesterol less than 100 mg/dL  -     lovastatin (MEVACOR) 20 MG tablet; Take 1 tablet (20 mg total) by mouth once daily.  Dispense: 90 tablet; Refill: 1    Chronic systolic congestive heart failure  -     metoprolol succinate (TOPROL-XL) 25 MG 24 hr tablet; Take 1 tablet (25 mg total) by mouth once daily.  Dispense: 30 tablet; Refill: 11  -     nitroGLYCERIN (NITROSTAT) 0.4 MG SL tablet; Place 1 tablet (0.4 mg total) under the tongue every 5 (five) minutes as needed for Chest pain.  Dispense: 30 tablet; Refill: 1  The 10-year ASCVD risk score (Reeders VICKY Jr., et al., 2013) is: 34.2%    Values used to calculate the score:      Age: 59 years      Sex: Male      Is Non- : No      Diabetic: No      Tobacco smoker: Yes      Systolic Blood Pressure: 140 mmHg      Is BP treated: Yes      HDL Cholesterol: 26 mg/dL      Total Cholesterol: 257 mg/dL     Obesity (BMI 30-39.9)  Counseled patient on his ideal body weight, health consequences of being obese and current recommendations including  "weekly exercise and a heart healthy diet.  Current BMI is:Estimated body mass index is 30.28 kg/m² as calculated from the following:    Height as of this encounter: 5' 6" (1.676 m).    Weight as of this encounter: 85.1 kg (187 lb 9.8 oz)..  Patient is aware that ideal BMI < 25 or Weight in (lb) to have BMI = 25: 154.6.            Patient readiness: acceptance and barriers:none    During the course of the visit the patient was educated and counseled about the following:     Hypertension:   Dietary sodium restriction.  Regular aerobic exercise.  Obesity:   General weight loss/lifestyle modification strategies discussed (elicit support from others; identify saboteurs; non-food rewards, etc).  Regular aerobic exercise program discussed.    Goals: Hypertension: Reduce Blood Pressure and Obesity: Reduce calorie intake and BMI    Did patient meet goals/outcomes: No    The following self management tools provided: blood pressure log    Patient Instructions (the written plan) was given to the patient/family.     Time spent with patient: 30 minutes    Barriers to medications present (no )    Adverse reactions to current medications (no)    Over the counter medications reviewed (Yes)        "

## 2019-07-01 ENCOUNTER — TELEPHONE (OUTPATIENT)
Dept: FAMILY MEDICINE | Facility: CLINIC | Age: 59
End: 2019-07-01

## 2019-07-01 ENCOUNTER — OFFICE VISIT (OUTPATIENT)
Dept: FAMILY MEDICINE | Facility: CLINIC | Age: 59
End: 2019-07-01
Payer: MEDICAID

## 2019-07-01 VITALS
WEIGHT: 185.44 LBS | OXYGEN SATURATION: 95 % | DIASTOLIC BLOOD PRESSURE: 88 MMHG | BODY MASS INDEX: 29.8 KG/M2 | RESPIRATION RATE: 19 BRPM | TEMPERATURE: 98 F | HEIGHT: 66 IN | SYSTOLIC BLOOD PRESSURE: 126 MMHG | HEART RATE: 70 BPM

## 2019-07-01 DIAGNOSIS — R06.2 WHEEZING: ICD-10-CM

## 2019-07-01 DIAGNOSIS — E66.3 OVERWEIGHT (BMI 25.0-29.9): ICD-10-CM

## 2019-07-01 DIAGNOSIS — I10 ESSENTIAL HYPERTENSION: ICD-10-CM

## 2019-07-01 DIAGNOSIS — J02.9 SORE THROAT: ICD-10-CM

## 2019-07-01 DIAGNOSIS — J20.8 ACUTE BACTERIAL BRONCHITIS: ICD-10-CM

## 2019-07-01 DIAGNOSIS — R09.81 NASAL CONGESTION: ICD-10-CM

## 2019-07-01 DIAGNOSIS — F41.1 ANXIETY AS ACUTE REACTION TO EXCEPTIONAL STRESS: Primary | ICD-10-CM

## 2019-07-01 DIAGNOSIS — F43.0 ANXIETY AS ACUTE REACTION TO EXCEPTIONAL STRESS: Primary | ICD-10-CM

## 2019-07-01 DIAGNOSIS — B96.89 ACUTE BACTERIAL BRONCHITIS: ICD-10-CM

## 2019-07-01 LAB
CTP QC/QA: YES
S PYO RRNA THROAT QL PROBE: NEGATIVE

## 2019-07-01 PROCEDURE — 94640 AIRWAY INHALATION TREATMENT: CPT | Mod: PBBFAC,PO

## 2019-07-01 PROCEDURE — 99999 PR PBB SHADOW E&M-EST. PATIENT-LVL IV: ICD-10-PCS | Mod: PBBFAC,,, | Performed by: NURSE PRACTITIONER

## 2019-07-01 PROCEDURE — 99999 PR PBB SHADOW E&M-EST. PATIENT-LVL IV: CPT | Mod: PBBFAC,,, | Performed by: NURSE PRACTITIONER

## 2019-07-01 PROCEDURE — 99214 OFFICE O/P EST MOD 30 MIN: CPT | Mod: S$PBB,,, | Performed by: NURSE PRACTITIONER

## 2019-07-01 PROCEDURE — 87070 CULTURE OTHR SPECIMN AEROBIC: CPT

## 2019-07-01 PROCEDURE — 96372 THER/PROPH/DIAG INJ SC/IM: CPT | Mod: PBBFAC,PO

## 2019-07-01 PROCEDURE — 99214 OFFICE O/P EST MOD 30 MIN: CPT | Mod: PBBFAC,PO,25 | Performed by: NURSE PRACTITIONER

## 2019-07-01 PROCEDURE — 87880 STREP A ASSAY W/OPTIC: CPT | Mod: PBBFAC,PO | Performed by: NURSE PRACTITIONER

## 2019-07-01 PROCEDURE — 99214 PR OFFICE/OUTPT VISIT, EST, LEVL IV, 30-39 MIN: ICD-10-PCS | Mod: S$PBB,,, | Performed by: NURSE PRACTITIONER

## 2019-07-01 RX ORDER — FLUTICASONE PROPIONATE 50 MCG
1 SPRAY, SUSPENSION (ML) NASAL DAILY
Qty: 1 BOTTLE | Refills: 2 | Status: SHIPPED | OUTPATIENT
Start: 2019-07-01 | End: 2020-08-10 | Stop reason: ALTCHOICE

## 2019-07-01 RX ORDER — IPRATROPIUM BROMIDE AND ALBUTEROL SULFATE 2.5; .5 MG/3ML; MG/3ML
3 SOLUTION RESPIRATORY (INHALATION)
Status: COMPLETED | OUTPATIENT
Start: 2019-07-01 | End: 2019-07-01

## 2019-07-01 RX ORDER — PREDNISONE 20 MG/1
20 TABLET ORAL DAILY
Qty: 5 TABLET | Refills: 0 | Status: SHIPPED | OUTPATIENT
Start: 2019-07-02 | End: 2019-07-07

## 2019-07-01 RX ORDER — AMOXICILLIN AND CLAVULANATE POTASSIUM 875; 125 MG/1; MG/1
1 TABLET, FILM COATED ORAL EVERY 12 HOURS
Qty: 14 TABLET | Refills: 0 | Status: SHIPPED | OUTPATIENT
Start: 2019-07-01 | End: 2019-07-08

## 2019-07-01 RX ORDER — HYDROXYZINE PAMOATE 25 MG/1
25 CAPSULE ORAL 4 TIMES DAILY
Qty: 30 CAPSULE | Refills: 0 | Status: ON HOLD | OUTPATIENT
Start: 2019-07-01 | End: 2020-08-05 | Stop reason: HOSPADM

## 2019-07-01 RX ORDER — IPRATROPIUM BROMIDE AND ALBUTEROL SULFATE 2.5; .5 MG/3ML; MG/3ML
3 SOLUTION RESPIRATORY (INHALATION)
Status: DISCONTINUED | OUTPATIENT
Start: 2019-07-01 | End: 2019-07-01

## 2019-07-01 RX ADMIN — IPRATROPIUM BROMIDE AND ALBUTEROL SULFATE 3 ML: .5; 3 SOLUTION RESPIRATORY (INHALATION) at 02:07

## 2019-07-01 RX ADMIN — METHYLPREDNISOLONE SODIUM SUCCINATE 125 MG: 125 INJECTION, POWDER, FOR SOLUTION INTRAMUSCULAR; INTRAVENOUS at 03:07

## 2019-07-01 NOTE — PROGRESS NOTES
"Subjective:       Patient ID: Kal Dodd is a 59 y.o. male.    Chief Complaint: cough, sore throat, back pain    URI    This is a new problem. The current episode started in the past 7 days. There has been no fever. Associated symptoms include congestion, coughing, sinus pain and a sore throat. Pertinent negatives include no chest pain, headaches, nausea, rash or sneezing.   Anxiety   Presents for initial visit. Onset was in the past 7 days. The problem has been unchanged. Symptoms include depressed mood, insomnia, nervous/anxious behavior and palpitations. Patient reports no chest pain, nausea, shortness of breath or suicidal ideas. Symptoms occur most days. The severity of symptoms is moderate. The symptoms are aggravated by family issues. The patient sleeps 4 hours per night. The quality of sleep is fair. Nighttime awakenings: one to two.     Risk factors include a major life event. His past medical history is significant for depression. Past treatments include nothing.   Patient reports increase stress, 20 year old daughter  last week and he is planning the . Patient is requesting Valium, he is taking suboxone.    Past Medical History:   Diagnosis Date    Coronary artery disease 11    "massive heart attack"    Heart attack     Hyperlipidemia     Hypertension        Review of patient's allergies indicates:  No Known Allergies      Current Outpatient Medications:     aspirin (ECOTRIN) 81 MG EC tablet, Take 81 mg by mouth once daily.  , Disp: , Rfl:     buprenorphine-naloxone (SUBOXONE) 8-2 mg Film, Place under the tongue once daily., Disp: , Rfl:     fenofibrate 160 MG Tab, Take 1 tablet (160 mg total) by mouth once daily., Disp: 90 tablet, Rfl: 0    furosemide (LASIX) 20 MG tablet, Take 1 tablet (20 mg total) by mouth 2 (two) times daily., Disp: 60 tablet, Rfl: 11    lisinopril (PRINIVIL,ZESTRIL) 40 MG tablet, Take 1 tablet (40 mg total) by mouth once daily., Disp: 90 tablet, Rfl: " 3    lovastatin (MEVACOR) 20 MG tablet, Take 1 tablet (20 mg total) by mouth once daily., Disp: 90 tablet, Rfl: 1    metoprolol succinate (TOPROL-XL) 25 MG 24 hr tablet, Take 1 tablet (25 mg total) by mouth once daily., Disp: 30 tablet, Rfl: 11    nitroGLYCERIN (NITROSTAT) 0.4 MG SL tablet, Place 1 tablet (0.4 mg total) under the tongue every 5 (five) minutes as needed for Chest pain., Disp: 30 tablet, Rfl: 1    amoxicillin-clavulanate 875-125mg (AUGMENTIN) 875-125 mg per tablet, Take 1 tablet by mouth every 12 (twelve) hours. for 7 days, Disp: 14 tablet, Rfl: 0    fluticasone propionate (FLONASE) 50 mcg/actuation nasal spray, 1 spray (50 mcg total) by Each Nare route once daily., Disp: 1 Bottle, Rfl: 2    hydrOXYzine pamoate (VISTARIL) 25 MG Cap, Take 1 capsule (25 mg total) by mouth 4 (four) times daily., Disp: 30 capsule, Rfl: 0    nystatin (DUKE'S SOLUTION), Take 10 mLs by mouth 4 (four) times daily., Disp: 120 mL, Rfl: 0    predniSONE (DELTASONE) 20 MG tablet, Take 1 tablet (20 mg total) by mouth once daily. for 5 days, Disp: 5 tablet, Rfl: 0    Review of Systems   Constitutional: Negative for unexpected weight change.   HENT: Positive for congestion, sinus pain and sore throat. Negative for sneezing and trouble swallowing.    Eyes: Negative for visual disturbance.   Respiratory: Positive for cough. Negative for shortness of breath.    Cardiovascular: Positive for palpitations. Negative for chest pain and leg swelling.   Gastrointestinal: Negative for blood in stool and nausea.   Genitourinary: Negative for hematuria.   Skin: Negative for rash.   Allergic/Immunologic: Negative for immunocompromised state.   Neurological: Negative for headaches.   Hematological: Does not bruise/bleed easily.   Psychiatric/Behavioral: Negative for agitation and suicidal ideas. The patient is nervous/anxious and has insomnia.        Objective:      /88 (BP Location: Right arm, Patient Position: Sitting, BP Method:  "Medium (Manual))   Pulse 70   Temp 98 °F (36.7 °C) (Oral)   Resp 19   Ht 5' 6" (1.676 m)   Wt 84.1 kg (185 lb 6.5 oz)   SpO2 95%   BMI 29.93 kg/m²   Physical Exam   Constitutional: He is oriented to person, place, and time. He appears well-developed and well-nourished.   HENT:   Right Ear: A middle ear effusion is present.   Left Ear: A middle ear effusion is present.   Nose: Right sinus exhibits maxillary sinus tenderness. Left sinus exhibits maxillary sinus tenderness.   Mouth/Throat: Posterior oropharyngeal edema and posterior oropharyngeal erythema present.       Eyes: Pupils are equal, round, and reactive to light. Conjunctivae and EOM are normal.   Neck: Normal range of motion. Neck supple.   Cardiovascular: Normal rate, regular rhythm, normal heart sounds and intact distal pulses.   Pulmonary/Chest: Effort normal and breath sounds normal.   Abdominal: Soft. Bowel sounds are normal.   Musculoskeletal: Normal range of motion.   Neurological: He is alert and oriented to person, place, and time.   Skin: Skin is warm and dry.   Psychiatric: He has a normal mood and affect. His behavior is normal. Judgment and thought content normal.       Assessment:       1. Anxiety as acute reaction to exceptional stress    2. Sore throat    3. Wheezing    4. Acute bacterial bronchitis    5. Nasal congestion    6. Essential hypertension    7. Overweight (BMI 25.0-29.9)        Plan:       Anxiety as acute reaction to exceptional stress  -     hydrOXYzine pamoate (VISTARIL) 25 MG Cap; Take 1 capsule (25 mg total) by mouth 4 (four) times daily.  Dispense: 30 capsule; Refill: 0    Sore throat  -     Discontinue: methylPREDNISolone sod suc(PF) injection 125 mg  -     nystatin (DUKE'S SOLUTION); Take 10 mLs by mouth 4 (four) times daily.  Dispense: 120 mL; Refill: 0  -     POCT Rapid Strep A  -     Throat culture    Wheezing  -     albuterol-ipratropium 2.5 mg-0.5 mg/3 mL nebulizer solution 3 mL  -     Discontinue: " "methylPREDNISolone sod suc(PF) injection 125 mg  -     nystatin (DUKE'S SOLUTION); Take 10 mLs by mouth 4 (four) times daily.  Dispense: 120 mL; Refill: 0  -     predniSONE (DELTASONE) 20 MG tablet; Take 1 tablet (20 mg total) by mouth once daily. for 5 days  Dispense: 5 tablet; Refill: 0  -     methylPREDNISolone sod suc(PF) injection 125 mg    Acute bacterial bronchitis  -     amoxicillin-clavulanate 875-125mg (AUGMENTIN) 875-125 mg per tablet; Take 1 tablet by mouth every 12 (twelve) hours. for 7 days  Dispense: 14 tablet; Refill: 0    Nasal congestion  -     fluticasone propionate (FLONASE) 50 mcg/actuation nasal spray; 1 spray (50 mcg total) by Each Nare route once daily.  Dispense: 1 Bottle; Refill: 2    Essential hypertension  Controlled, continue medication  BP Readings from Last 3 Encounters:   07/01/19 126/88   05/31/19 (!) 140/74   09/28/18 133/78     Overweight (BMI 25.0-29.9)  Counseled patient on his ideal body weight, health consequences of being obese and current recommendations including weekly exercise and a heart healthy diet.  Current BMI is:Estimated body mass index is 29.93 kg/m² as calculated from the following:    Height as of this encounter: 5' 6" (1.676 m).    Weight as of this encounter: 84.1 kg (185 lb 6.5 oz)..  Patient is aware that ideal BMI < 25 or Weight in (lb) to have BMI = 25: 154.6.            Patient readiness: acceptance and barriers:none    During the course of the visit the patient was educated and counseled about the following:     Hypertension:   Dietary sodium restriction.  Regular aerobic exercise.  Obesity:   General weight loss/lifestyle modification strategies discussed (elicit support from others; identify saboteurs; non-food rewards, etc).  Regular aerobic exercise program discussed.    Goals: Hypertension: Reduce Blood Pressure and Obesity: Reduce calorie intake and BMI    Did patient meet goals/outcomes: No    The following self management tools provided: " declined    Patient Instructions (the written plan) was given to the patient/family.     Time spent with patient: 30 minutes    Barriers to medications present (no )    Adverse reactions to current medications (no)    Over the counter medications reviewed (Yes)

## 2019-07-01 NOTE — PROGRESS NOTES
2 Patent identifiers used (name and ). Administered 125 mg Solu Medrol IM. Patient tolerated well. No bleeding at insertion site noted. Pain scale 0/10. Aseptic technique maintained. Administered Albuterol-Ipratropium 2.5 mg-0.5 mg/3 ml nebulizer solution. Patient tolerated will. No shortness of breath noted.

## 2019-07-01 NOTE — TELEPHONE ENCOUNTER
----- Message from Johannroseann Gautam sent at 7/1/2019 12:40 PM CDT -----  Contact: self   Type:  Same Day Appointment Request    Caller is requesting a same day appointment.  Caller declined first available appointment listed below.      Name of Caller: patient   When is the first available appointment?  n/a  Symptoms:  Ongoing cold   Best Call Back Number: [phone] (home)

## 2019-07-01 NOTE — PATIENT INSTRUCTIONS
Bronchitis with Wheezing (Child)    Bronchitis is inflammation and swelling of the lining of the lungs. This is often caused by an infection. Symptoms include a dry, hacking cough that is worse at night. The cough may bring up yellow-green mucus. Your child may also breathe fast or seem short of breath. He or she may have a fever. Other symptoms may include tiredness, chest discomfort, and chills. Inflammation may limit how much air can flow through the airways. This can cause wheezing and trouble breathing, even in children who dont have asthma. Wheezing is a whistling sound caused by breathing through narrowed airways.  Bronchitis is most often caused by a virus of the upper respiratory tract. Symptoms can last up to 2 weeks, although the cough may last much longer. Medicines may be given to help relieve symptoms, including wheezing. Antibiotics will be prescribed only if your childs health care provider thinks your child has a bacterial infection. Antibiotics do not cure a viral infection.  Home care  Follow these guidelines when caring for your child at home:  · Your childs health care provider may prescribe medicine for cough, pain, or fever. You may be told to use saltwater (saline) nose drops to help with breathing. Use these before your child eats or sleeps. Your child may be prescribed bronchodilator medicine. This is to help with breathing. It may come as a spray, inhaler, or pill to take by mouth. Make sure your child uses the medicine exactly at the times advised. Follow all instructions for giving these medicines to your child.  · The provider may also prescribe an oral antibiotic for your child. This is to help stop an infection. Follow all instructions for giving this medicine to your child. Make sure your child takes the medicine every day until it is gone. You should not have any left over.  · You may use over-the-counter medication as directed based on age and weight for fever or discomfort.  (Note: If your child has chronic liver or kidney disease or has ever had a stomach ulcer or gastrointestinal bleeding, talk with your healthcare provider before using these medicines.) Aspirin should never be given to anyone younger than 18 years of age who is ill with a viral infection or fever. It may cause severe liver or brain damage. Dont give your child any other medicine without first asking the healthcare provider.  · Dont give a child under age 6 cough or cold medicine unless the provider tells you to do so. These have been shown to not help young children, and may cause serious side effects.  · Wash your hands well with soap and warm water before and after caring for your child. This is to help prevent spreading infection.  · Give your child plenty of time to rest. Trouble sleeping is common with this illness. Have your child sleep in a slightly upright position. This is to help make breathing easier. If possible, raise the head of the bed a few inches. Or prop your childs body up with pillows.  · Make sure your older child blows his or her nose effectively. Your childs healthcare provider may recommend saline nose drops to help thin and remove nasal secretions. Saline nose drops are available without a prescription. You can also use 1/4 teaspoon of table salt mixed well in 1 cup of water. You may put 2 to 3 drops of saline drops in each nostril before having your child blow his or her nose. Always wash your hands after touching used tissues.  · For younger children, suction mucus from the nose with saline nose drops and a small bulb syringe. Talk with your childs healthcare provider or pharmacist if you dont know how to use a bulb syringe. Always wash your hands after using a bulb syringe or touching used tissues.  · To prevent dehydration and help loosen lung secretions in toddlers and older children, make sure your child drinks plenty of liquids. Children may prefer cold drinks, frozen desserts,  or ice pops. They may also like warm soup or drinks with lemon and honey. Dont give honey to a child younger than 1 year old.  · To prevent dehydration and help loosen lung secretions in infants under 1 year old, make sure your child drinks plenty of liquids. Use a medicine dropper, if needed, to give small amounts of breast milk, formula, or oral rehydration solution to your baby. Give 1 to 2 teaspoons every 10 to 15 minutes. A baby may only be able to feed for short amounts of time. If you are breastfeeding, pump and store milk for later use. Give your child oral rehydration solution between feedings. This is available from grocery stores and drugstores without a prescription.  · To make breathing easier during sleep, use a cool-mist humidifier in your childs bedroom. Clean and dry the humidifier daily to prevent bacteria and mold growth. Dont use a hot-water vaporizer. It can cause burns. Your child may also feel more comfortable sitting in a steamy bathroom for up to 10 minutes.  · Dont expose your child to cigarette smoke. Tobacco smoke can make your childs symptoms worse.  Follow-up care  Follow up with your childs health care provider, or as advised.  When to seek medical advice  For a usually healthy child, call your child's healthcare provider right away if any of these occur:  · Your child is 3 months old or younger and has a fever of 100.4°F (38°C) or higher. Get medical care right away. Fever in a young baby can be a sign of a dangerous infection.  · Your child is of any age and has repeated fevers above 104°F (40°C).  · Your child is younger than 2 years of age and a fever of 100.4°F (38°C) continues for more than 1 day.  · Your child is 2 years old or older and a fever of 100.4°F (38°C) continues for more than 3 days.  · Symptoms dont get better in 1 to 2 weeks, or get worse.  · Breathing difficulty doesnt get better in several days.  · Your child loses his or her appetite or feeds  poorly.  · Your child shows signs of dehydration, such as dry mouth, crying with no tears, or urinating less than normal.  · The medicine doesnt relieve wheezing.  Call 911, or get immediate medical care  Contact emergency services if any of these occur:  · Increasing trouble breathing or increasing wheezing  · Extreme drowsiness or trouble awakening  · Confusion  · Fainting or loss of consciousness  Date Last Reviewed: 9/13/2015 © 2000-2017 [employer]. [address] [place] [address], [place] [address]. All rights reserved. This information is not intended as a substitute for professional medical care. Always follow your healthcare professional's instructions.        Treating Anxiety Disorders with Medicine  An anxiety disorder can make you feel nervous or apprehensive, even without a clear reason. In people age 65 and older, generalized anxiety disorder is one of the most commonly diagnosed anxiety disorders. Many times it occurs with depression. Certain anxiety disorders can cause intense feelings of fear or panic. You may even have physical symptoms such as a racing heartbeat, sweating, or dizziness. If you have these feelings, you dont have to suffer anymore. Treatment to help you overcome your fears will likely include therapy (also called counseling). Medicine may also be prescribed to help control your symptoms.    Medicines  Certain medicines may be prescribed to help control your symptoms. So you may feel less anxious. You may also feel able to move forward with therapy. At first, medicines and dosages may need to be adjusted to find what works best for you. Try to be patient. Tell your healthcare provider how a medicine makes you feel. This way, you can work together to find the treatment thats best for you. Keep in mind that medicines can have side effects. Talk with your provider about any side effects that are bothering you. Changing the dose or type of medicine may help. Dont stop taking  medicine on your own. That can cause symptoms to come back.  · Anti-anxiety medicine. This medicine eases symptoms and helps you relax. Your healthcare provider will explain when and how to use it. It may be prescribed for use before situations that make you anxious. You may also be told to take medicine on a regular schedule. Anti-anxiety medicine may make you feel a little sleepy or out of it. Dont drive a car or operate machinery while on this medicine, until you know how it affects you.  Caution  Never use alcohol or other drugs with anti-anxiety medicines. This could result in loss of muscular control, sedation, coma, or death. Also, use only the amount of medicine prescribed for you. If you think you may have taken too much, get emergency care right away.   · Antidepressant medicine. This kind of medicine is often used to treat anxiety, even if you arent depressed. An antidepressant helps balance out brain chemicals. This helps keep anxiety under control. This medicine is taken on a schedule. It takes a few weeks to start working. If you dont notice a change at first, you may just need more time. But if you dont notice results after the first few weeks, tell your provider.  Keep taking medicines as prescribed  Never change your dosage, share or use another person's medicine, or stop taking your medicines without talking to your healthcare provider first. Keep the following in mind:  · Some medicines must be taken on a schedule. Make this part of your daily routine. For instance, always take your pill before brushing your teeth. A pillbox can help you remember if youve taken your medicine each day.  · Medicines are often taken for 6 to 12 months. Your healthcare provider will then evaluate whether you need to stay on them. Many people who have also had therapy may no longer need medicine to manage anxiety.  · You may need to stop taking medicine slowly to give your body time to adjust. When its time to  stop, your healthcare provider will tell you more. Remember: Never stop taking your medicine without talking to your provider first.  · If symptoms return, you may need to start taking medicines again. This isnt your fault. Its just the nature of your anxiety disorder.  Special concerns  · Side effects. Medicines may cause side effects. Ask your healthcare provider or pharmacist what you can expect. They may have ideas for avoiding some side effects.  · Sexual problems. Some antidepressants can affect your desire for sex or your ability to have an orgasm. A change in dosage or medicine often solves the problem. If you have a sexual side effect that concerns you, tell your healthcare provider.  · Addiction. If youve never had a problem with drugs or alcohol, you may not have a problem with medicines used to treat anxiety disorders. But always discuss the medicines with your healthcare provider before taking them. If you have a history of addiction, you may not be able to use certain medicines used to treat anxiety disorders.  · Medicine interactions. Always check with your pharmacist before using any over-the-counter medicines, including herbal supplements.   Date Last Reviewed: 5/1/2017  © 0651-2086 [employer]. [address] [place] [address]. All rights reserved. This information is not intended as a substitute for professional medical care. Always follow your healthcare professional's instructions.

## 2019-07-01 NOTE — TELEPHONE ENCOUNTER
"Pt c/o sore throat and coughing. He stated his back is "killing" him from coughing. Pt requesting same day appt, scheduled with BHARATH Koehler at 2pm. Understanding verbalized of location, date and time.   "

## 2019-07-04 LAB — BACTERIA THROAT CULT: NORMAL

## 2019-07-05 ENCOUNTER — TELEPHONE (OUTPATIENT)
Dept: FAMILY MEDICINE | Facility: CLINIC | Age: 59
End: 2019-07-05

## 2019-07-05 NOTE — TELEPHONE ENCOUNTER
----- Message from Patrick Elias sent at 7/5/2019  9:37 AM CDT -----  Contact: Gopi  Type:  Pharmacy Calling to Clarify an RX    Name of Caller:  Gopi  Pharmacy Name:  [facility]  Prescription Name:  Tiffanie  What do they need to clarify?:  directions  Best Call Back Number:  [phone]  Additional Information:  Requesting a call back to verify medication,stated not on the market

## 2019-07-05 NOTE — TELEPHONE ENCOUNTER
Spoke to pharmacist at [facility] who states Nystatin (Cascade) in unavailable/no longer being manufactured. Pharmacy requesting to know what to mix Nystatin with. Please advise.

## 2019-07-24 ENCOUNTER — DOCUMENTATION ONLY (OUTPATIENT)
Dept: FAMILY MEDICINE | Facility: CLINIC | Age: 59
End: 2019-07-24

## 2019-07-24 ENCOUNTER — OFFICE VISIT (OUTPATIENT)
Dept: FAMILY MEDICINE | Facility: CLINIC | Age: 59
End: 2019-07-24
Payer: MEDICAID

## 2019-07-24 VITALS
HEIGHT: 66 IN | RESPIRATION RATE: 16 BRPM | TEMPERATURE: 99 F | SYSTOLIC BLOOD PRESSURE: 132 MMHG | BODY MASS INDEX: 29.87 KG/M2 | DIASTOLIC BLOOD PRESSURE: 84 MMHG | HEART RATE: 59 BPM | OXYGEN SATURATION: 96 % | WEIGHT: 185.88 LBS

## 2019-07-24 DIAGNOSIS — I50.22 CHRONIC SYSTOLIC CONGESTIVE HEART FAILURE: ICD-10-CM

## 2019-07-24 DIAGNOSIS — I10 ESSENTIAL HYPERTENSION: Primary | ICD-10-CM

## 2019-07-24 DIAGNOSIS — Z72.0 TOBACCO ABUSE: ICD-10-CM

## 2019-07-24 DIAGNOSIS — F32.A ANXIETY AND DEPRESSION: ICD-10-CM

## 2019-07-24 DIAGNOSIS — F41.9 ANXIETY AND DEPRESSION: ICD-10-CM

## 2019-07-24 PROCEDURE — 99214 PR OFFICE/OUTPT VISIT, EST, LEVL IV, 30-39 MIN: ICD-10-PCS | Mod: S$GLB,,, | Performed by: INTERNAL MEDICINE

## 2019-07-24 PROCEDURE — 99214 OFFICE O/P EST MOD 30 MIN: CPT | Mod: S$GLB,,, | Performed by: INTERNAL MEDICINE

## 2019-07-24 RX ORDER — SPIRONOLACTONE 25 MG/1
25 TABLET ORAL DAILY
Qty: 30 TABLET | Refills: 11 | Status: SHIPPED | OUTPATIENT
Start: 2019-07-24 | End: 2020-08-10 | Stop reason: SDUPTHER

## 2019-07-24 NOTE — PROGRESS NOTES
Health Maintenance Due   Topic Date Due    Fecal Occult Blood Test (FOBT)/[employer]  1960    TETANUS VACCINE  04/23/1978

## 2019-07-24 NOTE — PROGRESS NOTES
Subjective:      10:10 AM     Patient ID: Kal Dodd is a 59 y.o. male.    Chief Complaint: Establish Care (pt daughter passed away); Cough; and Stress    HPI     .  The patient has systolic congestive heart failure.  He is not very active especially since he has been grieving loss of his daughter.        CHIEF COMPLAINT: Depression: yes.  . Anxiety: yes.   HPI: He socially isolated.  Daughter  at 23 years of age 2 months ago.      ONSET/TIMIN months.  ago.  Similar problems in past: .yes.   . # of episodes  of panic per week      0. .    DURATION:  constant    QUALITY/COURSE:  Better    INTENSITY/SEVERITY: .Severity is #7 (10 point scale)    CONTEXT/WHEN:  Postpartum: no..  Personal loss:  Yes..  Stress: yes..    MODIFIERS/TREATMENTS: . Taking medications: yes.  Compliance with taking prescribed medications: yes.  alcohol abuse: no ...  Drug abuse: no .  Chronic disease: no.      The following symptoms are positive if BOLD, negative otherwise.        SYMPTOMS/RELATED: Possible medication side effects include:  dry mouth, constipation, diarrhea, decreased libido, impotence, GI disturbance, headache, insomnia, weight gain and weight loss.    REVIEW OF SYSTEMS: Sadness . Weakness . Fatigue . Lack_of _enjoyment_in_life . Sleep_disturbances . Anorexia .  Increased_appetite .  Irritability . Crying_spells .  Guilt .  Lost_concentration . Racing thoughts.  Suicidal_ideation .                CHIEF COMPLAINT: Cough(+).  HPI:     ONSET/TIMING:   3 wks  ago    DURATION:               Paroxysmal: no .    QUALITY/COURSE:. better    INTENSITY/SEVERITY: Severity is #  2 (10 point scale)      The following symptoms/statements are positive if BOLD, negative otherwise.      CONTEXT/WHEN:  Tobacco_use. Smokers_in_home. Seasonal_pattern. Allergies/Hayfever. Sinusitis. Irritant_Exposure(smoke/dust/fumes). Exposure_to_others_with_similar_symptoms.        Similar_problems_in_past.   PAST TREATMENT OR EVALUATION:  "  previous PPD. Recent_previous_chest_x-ray. Recent_antibiotics.  Associated Symptoms:     sputum production: scant. copious. Hemoptysis.  Medical History: Past_pulmonary_infections.  Cardiovascular_disease.chronic_lung_disease.  tuberculosis. Asthma. AIDS. Gastroesophageal_reflux_disease .  Congestive heart failure, systolic          CHIEF COMPLAINT: Hypertension  HPI:     ONSET:      QUALITY/COURSE:   Unchanged.     INTENSITY/SEVERITY:  Average blood pressure is unknown.      MODIFIERS/TREATMENTS:  Taking medications: yes. .High sodium intake: no. alcohol: no      The following symptoms are positive only if BOLDED, otherwise are negative.      SYMPTOMS/RELATED: Possible medication side effects include:   Depression..  . Cough. . Constipation.    REVIEW OF SYMPTOMS: . Weight_loss . Weight_gain . Leg_cramps .Potency_problems .    TARGET ORGAN DAMAGE:: angina/ prior myocardial infarction, chronic kidney disease, heart failure, left ventricular hypertrophy, peripheral artery disease, prior coronary revascularization, retinopathy, stroke. transient ischemic attack.      Review of Systems   Eyes: Negative for visual disturbance.   Respiratory: Positive for shortness of breath (with exertion, chronic and unchanged. ). Negative for chest tightness.    Cardiovascular: Negative for chest pain and leg swelling.   Neurological: Negative for dizziness, syncope, weakness and headaches.   Psychiatric/Behavioral: Negative for dysphoric mood. The patient is not nervous/anxious.          Objective:      Vitals:    07/24/19 0945   BP: 132/84   Pulse: (!) 59   Resp: 16   Temp: 98.5 °F (36.9 °C)   TempSrc: Oral   SpO2: 96%   Weight: 84.3 kg (185 lb 13.6 oz)   Height: 5' 6" (1.676 m)   PainSc: 0-No pain     Physical Exam   Constitutional: He appears well-developed and well-nourished.   Cardiovascular: Normal rate, regular rhythm and normal heart sounds.   Pulmonary/Chest: Effort normal and breath sounds normal.   Abdominal: Soft. There " is no tenderness.   Neurological: He is alert.   Psychiatric: He has a normal mood and affect. His behavior is normal. Thought content normal.   Nursing note and vitals reviewed.        Assessment:       1. Essential hypertension    2. Chronic systolic congestive heart failure    3. Anxiety and depression    4. Tobacco abuse          Plan:       Essential hypertension  -     Comprehensive metabolic panel; Future; Expected date: 07/24/2019    Chronic systolic congestive heart failure  -     spironolactone (ALDACTONE) 25 MG tablet; Take 1 tablet (25 mg total) by mouth once daily.  Dispense: 30 tablet; Refill: 11  -     Comprehensive metabolic panel; Future; Expected date: 07/24/2019    Anxiety and depression    Tobacco abuse  -     Complete PFT with bronchodilator; Future      Follow up in about 3 months (around 10/24/2019).

## 2019-07-24 NOTE — PATIENT INSTRUCTIONS
Restart duloxetine    Call us if you have any suicidal thoughts      Please fill out the patient experience survey.    Take full dose of lisinopril.

## 2019-07-29 DIAGNOSIS — Z12.11 COLON CANCER SCREENING: ICD-10-CM

## 2019-10-30 ENCOUNTER — DOCUMENTATION ONLY (OUTPATIENT)
Dept: FAMILY MEDICINE | Facility: CLINIC | Age: 59
End: 2019-10-30

## 2020-01-06 DIAGNOSIS — E78.5 HYPERLIPIDEMIA WITH TARGET LOW DENSITY LIPOPROTEIN (LDL) CHOLESTEROL LESS THAN 100 MG/DL: ICD-10-CM

## 2020-01-06 RX ORDER — LOVASTATIN 20 MG/1
20 TABLET ORAL DAILY
Qty: 90 TABLET | Refills: 1 | Status: SHIPPED | OUTPATIENT
Start: 2020-01-06 | End: 2020-07-17

## 2020-07-31 DIAGNOSIS — Z12.11 COLON CANCER SCREENING: ICD-10-CM

## 2020-08-01 ENCOUNTER — CLINICAL SUPPORT (OUTPATIENT)
Dept: CARDIOLOGY | Facility: HOSPITAL | Age: 60
DRG: 291 | End: 2020-08-01
Attending: HOSPITALIST
Payer: MEDICAID

## 2020-08-01 ENCOUNTER — HOSPITAL ENCOUNTER (INPATIENT)
Facility: HOSPITAL | Age: 60
LOS: 4 days | Discharge: HOME | DRG: 291 | End: 2020-08-05
Attending: EMERGENCY MEDICINE | Admitting: HOSPITALIST
Payer: MEDICAID

## 2020-08-01 VITALS — WEIGHT: 183 LBS | HEIGHT: 66 IN | BODY MASS INDEX: 29.41 KG/M2

## 2020-08-01 DIAGNOSIS — R07.9 CHEST PAIN: ICD-10-CM

## 2020-08-01 DIAGNOSIS — J96.01 ACUTE RESPIRATORY FAILURE WITH HYPOXIA AND HYPERCARBIA: ICD-10-CM

## 2020-08-01 DIAGNOSIS — F11.20 CHRONIC NARCOTIC DEPENDENCE: ICD-10-CM

## 2020-08-01 DIAGNOSIS — R09.2 RESPIRATORY ARREST: Primary | ICD-10-CM

## 2020-08-01 DIAGNOSIS — I50.21 ACUTE SYSTOLIC CONGESTIVE HEART FAILURE: ICD-10-CM

## 2020-08-01 DIAGNOSIS — R41.82 ALTERED MENTAL STATUS, UNSPECIFIED ALTERED MENTAL STATUS TYPE: ICD-10-CM

## 2020-08-01 DIAGNOSIS — J44.1 COPD WITH ACUTE EXACERBATION: ICD-10-CM

## 2020-08-01 DIAGNOSIS — J96.02 ACUTE RESPIRATORY FAILURE WITH HYPOXIA AND HYPERCARBIA: ICD-10-CM

## 2020-08-01 DIAGNOSIS — J81.0 ACUTE PULMONARY EDEMA: ICD-10-CM

## 2020-08-01 DIAGNOSIS — R06.02 SOB (SHORTNESS OF BREATH): ICD-10-CM

## 2020-08-01 DIAGNOSIS — I50.9 CHF (CONGESTIVE HEART FAILURE): ICD-10-CM

## 2020-08-01 DIAGNOSIS — I10 ESSENTIAL HYPERTENSION: ICD-10-CM

## 2020-08-01 PROBLEM — I50.1 PULMONARY EDEMA WITH CONGESTIVE HEART FAILURE WITH REDUCED LEFT VENTRICULAR FUNCTION: Status: ACTIVE | Noted: 2020-08-01

## 2020-08-01 LAB
ALBUMIN SERPL BCP-MCNC: 4 G/DL (ref 3.5–5.2)
ALLENS TEST: ABNORMAL
ALP SERPL-CCNC: 60 U/L (ref 55–135)
ALT SERPL W/O P-5'-P-CCNC: 68 U/L (ref 10–44)
AMPHET+METHAMPHET UR QL: NEGATIVE
ANION GAP SERPL CALC-SCNC: 14 MMOL/L (ref 8–16)
AORTIC ROOT ANNULUS: 3.16 CM
AORTIC VALVE CUSP SEPERATION: 1.76 CM
AST SERPL-CCNC: 100 U/L (ref 10–40)
AV INDEX (PROSTH): 0.99
AV MEAN GRADIENT: 4 MMHG
AV PEAK GRADIENT: 7 MMHG
AV VALVE AREA: 2.85 CM2
AV VELOCITY RATIO: 89.26
BACTERIA #/AREA URNS HPF: NEGATIVE /HPF
BARBITURATES UR QL SCN>200 NG/ML: NEGATIVE
BASOPHILS NFR BLD: 0 % (ref 0–1.9)
BENZODIAZ UR QL SCN>200 NG/ML: NORMAL
BILIRUB SERPL-MCNC: 0.7 MG/DL (ref 0.1–1)
BILIRUB UR QL STRIP: NEGATIVE
BNP SERPL-MCNC: 186 PG/ML (ref 0–99)
BSA FOR ECHO PROCEDURE: 1.97 M2
BUN SERPL-MCNC: 17 MG/DL (ref 8–23)
BZE UR QL SCN: NEGATIVE
CALCIUM SERPL-MCNC: 8.6 MG/DL (ref 8.7–10.5)
CANNABINOIDS UR QL SCN: NORMAL
CHLORIDE SERPL-SCNC: 102 MMOL/L (ref 95–110)
CLARITY UR: CLEAR
CO2 SERPL-SCNC: 23 MMOL/L (ref 23–29)
COLOR UR: YELLOW
CREAT SERPL-MCNC: 1.1 MG/DL (ref 0.5–1.4)
CREAT UR-MCNC: 106 MG/DL (ref 23–375)
CRP SERPL-MCNC: 0.43 MG/DL
CV ECHO LV RWT: 0.36 CM
DELSYS: ABNORMAL
DIFFERENTIAL METHOD: ABNORMAL
DOP CALC AO PEAK VEL: 1.32 M/S
DOP CALC AO VTI: 21.19 CM
DOP CALC LVOT AREA: 2.9 CM2
DOP CALC LVOT DIAMETER: 1.91 CM
DOP CALC LVOT PEAK VEL: 117.82 M/S
DOP CALC LVOT STROKE VOLUME: 60.37 CM3
DOP CALCLVOT PEAK VEL VTI: 21.08 CM
E WAVE DECELERATION TIME: 269.94 MSEC
E/A RATIO: 0.69
E/E' RATIO: 8.67 M/S
ECHO LV POSTERIOR WALL: 1.01 CM (ref 0.6–1.1)
EOSINOPHIL NFR BLD: 2 % (ref 0–8)
ERYTHROCYTE [DISTWIDTH] IN BLOOD BY AUTOMATED COUNT: 13.2 % (ref 11.5–14.5)
ERYTHROCYTE [SEDIMENTATION RATE] IN BLOOD BY WESTERGREN METHOD: 16 MM/H
ERYTHROCYTE [SEDIMENTATION RATE] IN BLOOD BY WESTERGREN METHOD: 18 MM/H
ERYTHROCYTE [SEDIMENTATION RATE] IN BLOOD BY WESTERGREN METHOD: 22 MM/H
EST. GFR  (AFRICAN AMERICAN): >60 ML/MIN/1.73 M^2
EST. GFR  (NON AFRICAN AMERICAN): >60 ML/MIN/1.73 M^2
FERRITIN SERPL-MCNC: 503 NG/ML (ref 20–300)
FIO2: 0.6
FIO2: 100
FIO2: 50
FRACTIONAL SHORTENING: 17 % (ref 28–44)
GLUCOSE SERPL-MCNC: 143 MG/DL (ref 70–110)
GLUCOSE SERPL-MCNC: 156 MG/DL (ref 70–110)
GLUCOSE SERPL-MCNC: 230 MG/DL (ref 70–110)
GLUCOSE UR QL STRIP: NEGATIVE
HCO3 UR-SCNC: 27 MMOL/L (ref 24–28)
HCO3 UR-SCNC: 28 MMOL/L (ref 24–28)
HCO3 UR-SCNC: 30.5 MMOL/L (ref 24–28)
HCT VFR BLD AUTO: 49 % (ref 40–54)
HGB BLD-MCNC: 15.6 G/DL (ref 14–18)
HGB UR QL STRIP: NEGATIVE
HYALINE CASTS #/AREA URNS LPF: 14 /LPF
IMM GRANULOCYTES # BLD AUTO: ABNORMAL K/UL (ref 0–0.04)
IMM GRANULOCYTES NFR BLD AUTO: ABNORMAL % (ref 0–0.5)
INR PPP: 1
INTERVENTRICULAR SEPTUM: 1.27 CM (ref 0.6–1.1)
IVRT: 182.33 MSEC
KETONES UR QL STRIP: NEGATIVE
LACTATE SERPL-SCNC: 1.7 MMOL/L (ref 0.5–1.9)
LACTATE SERPL-SCNC: 2.3 MMOL/L (ref 0.5–1.9)
LACTATE SERPL-SCNC: 3.4 MMOL/L (ref 0.5–1.9)
LEFT ATRIUM SIZE: 4.24 CM
LEFT INTERNAL DIMENSION IN SYSTOLE: 4.7 CM (ref 2.1–4)
LEFT VENTRICLE DIASTOLIC VOLUME INDEX: 82.86 ML/M2
LEFT VENTRICLE DIASTOLIC VOLUME: 159.6 ML
LEFT VENTRICLE MASS INDEX: 138 G/M2
LEFT VENTRICLE SYSTOLIC VOLUME INDEX: 48.9 ML/M2
LEFT VENTRICLE SYSTOLIC VOLUME: 94.2 ML
LEFT VENTRICULAR INTERNAL DIMENSION IN DIASTOLE: 5.66 CM (ref 3.5–6)
LEFT VENTRICULAR MASS: 266.21 G
LEUKOCYTE ESTERASE UR QL STRIP: NEGATIVE
LV LATERAL E/E' RATIO: 7.43 M/S
LV SEPTAL E/E' RATIO: 10.4 M/S
LYMPHOCYTES NFR BLD: 67 % (ref 18–48)
MAGNESIUM SERPL-MCNC: 2 MG/DL (ref 1.6–2.6)
MCH RBC QN AUTO: 28.9 PG (ref 27–31)
MCHC RBC AUTO-ENTMCNC: 31.8 G/DL (ref 32–36)
MCV RBC AUTO: 91 FL (ref 82–98)
MICROSCOPIC COMMENT: ABNORMAL
MODE: ABNORMAL
MONOCYTES NFR BLD: 0 % (ref 4–15)
MV PEAK A VEL: 0.75 M/S
MV PEAK E VEL: 0.52 M/S
NEUTROPHILS NFR BLD: 31 % (ref 38–73)
NITRITE UR QL STRIP: NEGATIVE
NRBC BLD-RTO: 0 /100 WBC
OPIATES UR QL SCN: NEGATIVE
PCO2 BLDA: 45 MMHG (ref 35–45)
PCO2 BLDA: 58.6 MMHG (ref 35–45)
PCO2 BLDA: 88 MMHG (ref 35–45)
PCP UR QL SCN>25 NG/ML: NEGATIVE
PEEP: 5
PH SMN: 7.15 [PH] (ref 7.35–7.45)
PH SMN: 7.29 [PH] (ref 7.35–7.45)
PH SMN: 7.39 [PH] (ref 7.35–7.45)
PH UR STRIP: 8 [PH] (ref 5–8)
PLATELET # BLD AUTO: 321 K/UL (ref 150–350)
PMV BLD AUTO: 9 FL (ref 9.2–12.9)
PO2 BLDA: 389 MMHG (ref 80–100)
PO2 BLDA: 66 MMHG (ref 80–100)
PO2 BLDA: 67 MMHG (ref 80–100)
POC BE: 1 MMOL/L
POC BE: 2 MMOL/L
POC BE: 2 MMOL/L
POC SATURATED O2: 100 % (ref 95–100)
POC SATURATED O2: 90 % (ref 95–100)
POC SATURATED O2: 92 % (ref 95–100)
POC TCO2: 28 MMOL/L (ref 23–27)
POC TCO2: 30 MMOL/L (ref 23–27)
POC TCO2: 33 MMOL/L (ref 23–27)
POTASSIUM SERPL-SCNC: 3.6 MMOL/L (ref 3.5–5.1)
PROCALCITONIN SERPL IA-MCNC: <0.05 NG/ML (ref 0–0.5)
PROT SERPL-MCNC: 7 G/DL (ref 6–8.4)
PROT UR QL STRIP: ABNORMAL
PROTHROMBIN TIME: 13 SEC (ref 10.6–14.8)
PULM VEIN S/D RATIO: 0.85
PV PEAK D VEL: 35.2 M/S
PV PEAK S VEL: 29.99 M/S
PV PEAK VELOCITY: 97.78 CM/S
RA PRESSURE: 3 MMHG
RBC # BLD AUTO: 5.4 M/UL (ref 4.6–6.2)
RBC #/AREA URNS HPF: 1 /HPF (ref 0–4)
RIGHT VENTRICULAR END-DIASTOLIC DIMENSION: 250 CM
SAMPLE: ABNORMAL
SARS-COV-2 RDRP RESP QL NAA+PROBE: NEGATIVE
SITE: ABNORMAL
SODIUM SERPL-SCNC: 139 MMOL/L (ref 136–145)
SP GR UR STRIP: 1.02 (ref 1–1.03)
SP02: 100
SQUAMOUS #/AREA URNS HPF: 3 /HPF
TDI LATERAL: 0.07 M/S
TDI SEPTAL: 0.05 M/S
TDI: 0.06 M/S
TOXICOLOGY INFORMATION: NORMAL
TROPONIN I SERPL DL<=0.01 NG/ML-MCNC: 0.04 NG/ML
TROPONIN I SERPL DL<=0.01 NG/ML-MCNC: 0.35 NG/ML
TROPONIN I SERPL DL<=0.01 NG/ML-MCNC: 0.43 NG/ML
URN SPEC COLLECT METH UR: ABNORMAL
UROBILINOGEN UR STRIP-ACNC: NEGATIVE EU/DL
VT: 450
VT: 500
VT: 500
WBC # BLD AUTO: 11.77 K/UL (ref 3.9–12.7)
WBC #/AREA URNS HPF: 2 /HPF (ref 0–5)

## 2020-08-01 PROCEDURE — 80307 DRUG TEST PRSMV CHEM ANLYZR: CPT

## 2020-08-01 PROCEDURE — 99900026 HC AIRWAY MAINTENANCE (STAT)

## 2020-08-01 PROCEDURE — 36592 COLLECT BLOOD FROM PICC: CPT

## 2020-08-01 PROCEDURE — 85007 BL SMEAR W/DIFF WBC COUNT: CPT

## 2020-08-01 PROCEDURE — 96365 THER/PROPH/DIAG IV INF INIT: CPT | Mod: 59

## 2020-08-01 PROCEDURE — 63600175 PHARM REV CODE 636 W HCPCS: Performed by: EMERGENCY MEDICINE

## 2020-08-01 PROCEDURE — 99900035 HC TECH TIME PER 15 MIN (STAT)

## 2020-08-01 PROCEDURE — 81001 URINALYSIS AUTO W/SCOPE: CPT

## 2020-08-01 PROCEDURE — 36600 WITHDRAWAL OF ARTERIAL BLOOD: CPT

## 2020-08-01 PROCEDURE — 43752 NASAL/OROGASTRIC W/TUBE PLMT: CPT

## 2020-08-01 PROCEDURE — 94002 VENT MGMT INPAT INIT DAY: CPT

## 2020-08-01 PROCEDURE — 83880 ASSAY OF NATRIURETIC PEPTIDE: CPT

## 2020-08-01 PROCEDURE — 63600175 PHARM REV CODE 636 W HCPCS: Performed by: HOSPITALIST

## 2020-08-01 PROCEDURE — 27000221 HC OXYGEN, UP TO 24 HOURS

## 2020-08-01 PROCEDURE — 93005 ELECTROCARDIOGRAM TRACING: CPT | Performed by: INTERNAL MEDICINE

## 2020-08-01 PROCEDURE — 83605 ASSAY OF LACTIC ACID: CPT | Mod: 91

## 2020-08-01 PROCEDURE — 82803 BLOOD GASES ANY COMBINATION: CPT

## 2020-08-01 PROCEDURE — 87077 CULTURE AEROBIC IDENTIFY: CPT

## 2020-08-01 PROCEDURE — 99900031 HC PATIENT EDUCATION (STAT)

## 2020-08-01 PROCEDURE — 85027 COMPLETE CBC AUTOMATED: CPT

## 2020-08-01 PROCEDURE — 63700000 PHARM REV CODE 250 ALT 637 W/O HCPCS: Performed by: HOSPITALIST

## 2020-08-01 PROCEDURE — 99291 CRITICAL CARE FIRST HOUR: CPT | Mod: ,,, | Performed by: INTERNAL MEDICINE

## 2020-08-01 PROCEDURE — 94640 AIRWAY INHALATION TREATMENT: CPT

## 2020-08-01 PROCEDURE — 20000000 HC ICU ROOM

## 2020-08-01 PROCEDURE — 25000003 PHARM REV CODE 250: Performed by: EMERGENCY MEDICINE

## 2020-08-01 PROCEDURE — 86140 C-REACTIVE PROTEIN: CPT

## 2020-08-01 PROCEDURE — 87040 BLOOD CULTURE FOR BACTERIA: CPT

## 2020-08-01 PROCEDURE — 87070 CULTURE OTHR SPECIMN AEROBIC: CPT | Mod: 59

## 2020-08-01 PROCEDURE — 51702 INSERT TEMP BLADDER CATH: CPT

## 2020-08-01 PROCEDURE — 80053 COMPREHEN METABOLIC PANEL: CPT

## 2020-08-01 PROCEDURE — 99291 CRITICAL CARE FIRST HOUR: CPT

## 2020-08-01 PROCEDURE — 93306 TTE W/DOPPLER COMPLETE: CPT

## 2020-08-01 PROCEDURE — 63600175 PHARM REV CODE 636 W HCPCS: Performed by: NURSE PRACTITIONER

## 2020-08-01 PROCEDURE — 82728 ASSAY OF FERRITIN: CPT

## 2020-08-01 PROCEDURE — 99292 CRITICAL CARE ADDL 30 MIN: CPT

## 2020-08-01 PROCEDURE — 83735 ASSAY OF MAGNESIUM: CPT

## 2020-08-01 PROCEDURE — 84484 ASSAY OF TROPONIN QUANT: CPT | Mod: 91

## 2020-08-01 PROCEDURE — 93306 ECHO (CUPID ONLY): ICD-10-PCS | Mod: 26,,, | Performed by: INTERNAL MEDICINE

## 2020-08-01 PROCEDURE — 31500 INSERT EMERGENCY AIRWAY: CPT

## 2020-08-01 PROCEDURE — 99291 PR CRITICAL CARE, E/M 30-74 MINUTES: ICD-10-PCS | Mod: ,,, | Performed by: INTERNAL MEDICINE

## 2020-08-01 PROCEDURE — 25000003 PHARM REV CODE 250: Performed by: HOSPITALIST

## 2020-08-01 PROCEDURE — 25000242 PHARM REV CODE 250 ALT 637 W/ HCPCS: Performed by: INTERNAL MEDICINE

## 2020-08-01 PROCEDURE — U0002 COVID-19 LAB TEST NON-CDC: HCPCS

## 2020-08-01 PROCEDURE — 63600175 PHARM REV CODE 636 W HCPCS: Performed by: INTERNAL MEDICINE

## 2020-08-01 PROCEDURE — 84145 PROCALCITONIN (PCT): CPT

## 2020-08-01 PROCEDURE — 82962 GLUCOSE BLOOD TEST: CPT

## 2020-08-01 PROCEDURE — 87186 SC STD MICRODIL/AGAR DIL: CPT

## 2020-08-01 PROCEDURE — 85610 PROTHROMBIN TIME: CPT

## 2020-08-01 PROCEDURE — 84484 ASSAY OF TROPONIN QUANT: CPT

## 2020-08-01 PROCEDURE — 87070 CULTURE OTHR SPECIMN AEROBIC: CPT

## 2020-08-01 PROCEDURE — 36415 COLL VENOUS BLD VENIPUNCTURE: CPT

## 2020-08-01 PROCEDURE — 93306 TTE W/DOPPLER COMPLETE: CPT | Mod: 26,,, | Performed by: INTERNAL MEDICINE

## 2020-08-01 PROCEDURE — 87205 SMEAR GRAM STAIN: CPT

## 2020-08-01 PROCEDURE — 95819 EEG AWAKE AND ASLEEP: CPT

## 2020-08-01 PROCEDURE — 94761 N-INVAS EAR/PLS OXIMETRY MLT: CPT

## 2020-08-01 RX ORDER — MORPHINE SULFATE 2 MG/ML
2 INJECTION, SOLUTION INTRAMUSCULAR; INTRAVENOUS EVERY 4 HOURS PRN
Status: DISCONTINUED | OUTPATIENT
Start: 2020-08-01 | End: 2020-08-05 | Stop reason: HOSPADM

## 2020-08-01 RX ORDER — METOPROLOL SUCCINATE 25 MG/1
25 TABLET, EXTENDED RELEASE ORAL DAILY
Status: DISCONTINUED | OUTPATIENT
Start: 2020-08-01 | End: 2020-08-02

## 2020-08-01 RX ORDER — AMOXICILLIN 250 MG
1 CAPSULE ORAL 2 TIMES DAILY
Status: DISCONTINUED | OUTPATIENT
Start: 2020-08-01 | End: 2020-08-05 | Stop reason: HOSPADM

## 2020-08-01 RX ORDER — ASPIRIN 81 MG/1
81 TABLET ORAL DAILY
Status: DISCONTINUED | OUTPATIENT
Start: 2020-08-01 | End: 2020-08-05 | Stop reason: HOSPADM

## 2020-08-01 RX ORDER — PROPOFOL 10 MG/ML
15 INJECTION, EMULSION INTRAVENOUS
Status: COMPLETED | OUTPATIENT
Start: 2020-08-01 | End: 2020-08-01

## 2020-08-01 RX ORDER — MAGNESIUM SULFATE HEPTAHYDRATE 40 MG/ML
2 INJECTION, SOLUTION INTRAVENOUS
Status: DISCONTINUED | OUTPATIENT
Start: 2020-08-01 | End: 2020-08-05 | Stop reason: HOSPADM

## 2020-08-01 RX ORDER — IBUPROFEN 200 MG
16 TABLET ORAL
Status: DISCONTINUED | OUTPATIENT
Start: 2020-08-01 | End: 2020-08-05 | Stop reason: HOSPADM

## 2020-08-01 RX ORDER — IPRATROPIUM BROMIDE AND ALBUTEROL SULFATE 2.5; .5 MG/3ML; MG/3ML
3 SOLUTION RESPIRATORY (INHALATION) EVERY 6 HOURS
Status: DISCONTINUED | OUTPATIENT
Start: 2020-08-01 | End: 2020-08-01

## 2020-08-01 RX ORDER — CALCIUM CHLORIDE IN 0.9 % NACL 1 G/100 ML
1 INTRAVENOUS SOLUTION, PIGGYBACK (ML) INTRAVENOUS
Status: DISCONTINUED | OUTPATIENT
Start: 2020-08-01 | End: 2020-08-05 | Stop reason: HOSPADM

## 2020-08-01 RX ORDER — POTASSIUM CHLORIDE 20 MEQ/1
40 TABLET, EXTENDED RELEASE ORAL
Status: DISCONTINUED | OUTPATIENT
Start: 2020-08-01 | End: 2020-08-05 | Stop reason: HOSPADM

## 2020-08-01 RX ORDER — IPRATROPIUM BROMIDE AND ALBUTEROL SULFATE 2.5; .5 MG/3ML; MG/3ML
3 SOLUTION RESPIRATORY (INHALATION) EVERY 4 HOURS
Status: DISCONTINUED | OUTPATIENT
Start: 2020-08-01 | End: 2020-08-01

## 2020-08-01 RX ORDER — POTASSIUM CHLORIDE 20 MEQ/1
20 TABLET, EXTENDED RELEASE ORAL
Status: DISCONTINUED | OUTPATIENT
Start: 2020-08-01 | End: 2020-08-05 | Stop reason: HOSPADM

## 2020-08-01 RX ORDER — TALC
6 POWDER (GRAM) TOPICAL NIGHTLY PRN
Status: DISCONTINUED | OUTPATIENT
Start: 2020-08-01 | End: 2020-08-05 | Stop reason: HOSPADM

## 2020-08-01 RX ORDER — ONDANSETRON 2 MG/ML
4 INJECTION INTRAMUSCULAR; INTRAVENOUS EVERY 8 HOURS PRN
Status: DISCONTINUED | OUTPATIENT
Start: 2020-08-01 | End: 2020-08-05 | Stop reason: HOSPADM

## 2020-08-01 RX ORDER — POTASSIUM CHLORIDE 7.45 MG/ML
20 INJECTION INTRAVENOUS
Status: DISCONTINUED | OUTPATIENT
Start: 2020-08-01 | End: 2020-08-05 | Stop reason: HOSPADM

## 2020-08-01 RX ORDER — NITROGLYCERIN 20 MG/100ML
5 INJECTION INTRAVENOUS CONTINUOUS
Status: DISCONTINUED | OUTPATIENT
Start: 2020-08-01 | End: 2020-08-03

## 2020-08-01 RX ORDER — HYDROCODONE BITARTRATE AND ACETAMINOPHEN 5; 325 MG/1; MG/1
1 TABLET ORAL EVERY 6 HOURS PRN
Status: DISCONTINUED | OUTPATIENT
Start: 2020-08-01 | End: 2020-08-05 | Stop reason: HOSPADM

## 2020-08-01 RX ORDER — GLUCAGON 1 MG
1 KIT INJECTION
Status: DISCONTINUED | OUTPATIENT
Start: 2020-08-01 | End: 2020-08-05 | Stop reason: HOSPADM

## 2020-08-01 RX ORDER — FUROSEMIDE 10 MG/ML
40 INJECTION INTRAMUSCULAR; INTRAVENOUS
Status: DISCONTINUED | OUTPATIENT
Start: 2020-08-01 | End: 2020-08-05 | Stop reason: HOSPADM

## 2020-08-01 RX ORDER — LANOLIN ALCOHOL/MO/W.PET/CERES
800 CREAM (GRAM) TOPICAL
Status: DISCONTINUED | OUTPATIENT
Start: 2020-08-01 | End: 2020-08-05 | Stop reason: HOSPADM

## 2020-08-01 RX ORDER — INSULIN ASPART 100 [IU]/ML
1-10 INJECTION, SOLUTION INTRAVENOUS; SUBCUTANEOUS
Status: DISCONTINUED | OUTPATIENT
Start: 2020-08-01 | End: 2020-08-05 | Stop reason: HOSPADM

## 2020-08-01 RX ORDER — PROPOFOL 10 MG/ML
20 INJECTION, EMULSION INTRAVENOUS CONTINUOUS
Status: DISCONTINUED | OUTPATIENT
Start: 2020-08-01 | End: 2020-08-02

## 2020-08-01 RX ORDER — POTASSIUM CHLORIDE 20 MEQ/15ML
40 SOLUTION ORAL ONCE
Status: COMPLETED | OUTPATIENT
Start: 2020-08-01 | End: 2020-08-01

## 2020-08-01 RX ORDER — FLUTICASONE PROPIONATE 50 MCG
1 SPRAY, SUSPENSION (ML) NASAL DAILY
Status: DISCONTINUED | OUTPATIENT
Start: 2020-08-01 | End: 2020-08-05 | Stop reason: HOSPADM

## 2020-08-01 RX ORDER — ATORVASTATIN CALCIUM 20 MG/1
20 TABLET, FILM COATED ORAL NIGHTLY
Status: DISCONTINUED | OUTPATIENT
Start: 2020-08-01 | End: 2020-08-01

## 2020-08-01 RX ORDER — LEVETIRACETAM 10 MG/ML
1000 INJECTION INTRAVASCULAR ONCE
Status: COMPLETED | OUTPATIENT
Start: 2020-08-01 | End: 2020-08-01

## 2020-08-01 RX ORDER — POTASSIUM CHLORIDE 7.45 MG/ML
40 INJECTION INTRAVENOUS
Status: DISCONTINUED | OUTPATIENT
Start: 2020-08-01 | End: 2020-08-05 | Stop reason: HOSPADM

## 2020-08-01 RX ORDER — MAGNESIUM SULFATE HEPTAHYDRATE 40 MG/ML
4 INJECTION, SOLUTION INTRAVENOUS
Status: DISCONTINUED | OUTPATIENT
Start: 2020-08-01 | End: 2020-08-05 | Stop reason: HOSPADM

## 2020-08-01 RX ORDER — ROCURONIUM BROMIDE 10 MG/ML
80 INJECTION, SOLUTION INTRAVENOUS ONCE
Status: COMPLETED | OUTPATIENT
Start: 2020-08-01 | End: 2020-08-01

## 2020-08-01 RX ORDER — IPRATROPIUM BROMIDE AND ALBUTEROL SULFATE 2.5; .5 MG/3ML; MG/3ML
3 SOLUTION RESPIRATORY (INHALATION) EVERY 4 HOURS PRN
Status: DISCONTINUED | OUTPATIENT
Start: 2020-08-01 | End: 2020-08-02

## 2020-08-01 RX ORDER — ENOXAPARIN SODIUM 100 MG/ML
40 INJECTION SUBCUTANEOUS EVERY 24 HOURS
Status: DISCONTINUED | OUTPATIENT
Start: 2020-08-01 | End: 2020-08-05 | Stop reason: HOSPADM

## 2020-08-01 RX ORDER — POLYETHYLENE GLYCOL 3350 17 G/17G
17 POWDER, FOR SOLUTION ORAL DAILY
Status: DISCONTINUED | OUTPATIENT
Start: 2020-08-01 | End: 2020-08-05 | Stop reason: HOSPADM

## 2020-08-01 RX ORDER — SODIUM CHLORIDE 0.9 % (FLUSH) 0.9 %
10 SYRINGE (ML) INJECTION
Status: DISCONTINUED | OUTPATIENT
Start: 2020-08-01 | End: 2020-08-05 | Stop reason: HOSPADM

## 2020-08-01 RX ORDER — IBUPROFEN 200 MG
24 TABLET ORAL
Status: DISCONTINUED | OUTPATIENT
Start: 2020-08-01 | End: 2020-08-05 | Stop reason: HOSPADM

## 2020-08-01 RX ORDER — ACETAMINOPHEN 325 MG/1
650 TABLET ORAL EVERY 4 HOURS PRN
Status: DISCONTINUED | OUTPATIENT
Start: 2020-08-01 | End: 2020-08-05 | Stop reason: HOSPADM

## 2020-08-01 RX ORDER — ETOMIDATE 2 MG/ML
20 INJECTION INTRAVENOUS
Status: COMPLETED | OUTPATIENT
Start: 2020-08-01 | End: 2020-08-01

## 2020-08-01 RX ORDER — LISINOPRIL 5 MG/1
20 TABLET ORAL DAILY
Status: DISCONTINUED | OUTPATIENT
Start: 2020-08-01 | End: 2020-08-01

## 2020-08-01 RX ORDER — MAGNESIUM SULFATE 1 G/100ML
1 INJECTION INTRAVENOUS
Status: DISCONTINUED | OUTPATIENT
Start: 2020-08-01 | End: 2020-08-05 | Stop reason: HOSPADM

## 2020-08-01 RX ADMIN — LEVETIRACETAM INJECTION 1000 MG: 10 INJECTION INTRAVENOUS at 08:08

## 2020-08-01 RX ADMIN — POTASSIUM CHLORIDE 40 MEQ: 1.5 SOLUTION ORAL at 10:08

## 2020-08-01 RX ADMIN — POLYETHYLENE GLYCOL 3350 17 G: 17 POWDER, FOR SOLUTION ORAL at 10:08

## 2020-08-01 RX ADMIN — IPRATROPIUM BROMIDE AND ALBUTEROL SULFATE 3 ML: .5; 3 SOLUTION RESPIRATORY (INHALATION) at 03:08

## 2020-08-01 RX ADMIN — METHYLPREDNISOLONE SODIUM SUCCINATE 80 MG: 40 INJECTION, POWDER, FOR SOLUTION INTRAMUSCULAR; INTRAVENOUS at 09:08

## 2020-08-01 RX ADMIN — NITROGLYCERIN 5 MCG/MIN: 20 INJECTION INTRAVENOUS at 06:08

## 2020-08-01 RX ADMIN — IPRATROPIUM BROMIDE AND ALBUTEROL SULFATE 3 ML: .5; 3 SOLUTION RESPIRATORY (INHALATION) at 11:08

## 2020-08-01 RX ADMIN — ASPIRIN 81 MG: 81 TABLET, DELAYED RELEASE ORAL at 10:08

## 2020-08-01 RX ADMIN — METHYLPREDNISOLONE SODIUM SUCCINATE 80 MG: 40 INJECTION, POWDER, FOR SOLUTION INTRAMUSCULAR; INTRAVENOUS at 08:08

## 2020-08-01 RX ADMIN — ROCURONIUM BROMIDE 80 MG: 10 INJECTION, SOLUTION INTRAVENOUS at 05:08

## 2020-08-01 RX ADMIN — ETOMIDATE 20 MG: 2 INJECTION, SOLUTION INTRAVENOUS at 05:08

## 2020-08-01 RX ADMIN — PROPOFOL 20 MCG/KG/MIN: 10 INJECTION, EMULSION INTRAVENOUS at 06:08

## 2020-08-01 RX ADMIN — PROPOFOL 20 MCG/KG/MIN: 10 INJECTION, EMULSION INTRAVENOUS at 01:08

## 2020-08-01 RX ADMIN — ENOXAPARIN SODIUM 40 MG: 100 INJECTION SUBCUTANEOUS at 06:08

## 2020-08-01 RX ADMIN — PROPOFOL 15 MCG/KG/MIN: 10 INJECTION, EMULSION INTRAVENOUS at 07:08

## 2020-08-01 RX ADMIN — IPRATROPIUM BROMIDE AND ALBUTEROL SULFATE 3 ML: .5; 3 SOLUTION RESPIRATORY (INHALATION) at 08:08

## 2020-08-01 RX ADMIN — SENNOSIDES AND DOCUSATE SODIUM 1 TABLET: 8.6; 5 TABLET ORAL at 08:08

## 2020-08-01 RX ADMIN — FUROSEMIDE 40 MG: 10 INJECTION, SOLUTION INTRAMUSCULAR; INTRAVENOUS at 10:08

## 2020-08-01 RX ADMIN — FUROSEMIDE 40 MG: 10 INJECTION, SOLUTION INTRAMUSCULAR; INTRAVENOUS at 09:08

## 2020-08-01 NOTE — CARE UPDATE
08/01/20 0931   PRE-TX-O2   Oxygen Concentration (%) 50   SpO2 98 %   Pulse 60   Resp 19   Vent Select   Charged w/in last 24h YES   Preset Conventional Ventilator Settings   Ventilation Type VC   Vent Mode A/C   Set Rate 18 BPM   Vt Set 500 mL   PEEP/CPAP 5 cmH20   Pressure Support 0 cmH20   Waveform RAMP   Peak Flow 80 L/min   Plateau Set/Insp. Hold (sec) 0   Trigger Sensitivity Flow/I-Trigger 2 L/min   Patient Ventilator Parameters   Resp Rate Total 19 br/min   Peak Airway Pressure 37 cmH2O   Mean Airway Pressure 10 cmH20   Plateau Pressure 0 cmH20   Exhaled Vt 605 mL   Total Ve 9.56 mL   I:E Ratio Measured 1:3.90   Conventional Ventilator Alarms   Resp Rate High Alarm 40 br/min   Press High Alarm 60 cmH2O   Apnea Rate 20   Apnea Volume (mL) 1 mL   Apnea Oxygen Concentration  100   Apnea Flow Rate (L/min) 60   T Apnea 20 sec(s)   Ready to Wean/Extubation Screen   FIO2<=50 (chart decimal) 0.5   MV<16L (chart vol.) 9.56   PEEP <=8 (chart #) 5   Ready to Wean Parameters   F/VT Ratio<105 (RSBI) (!) 31.4   sputum sent

## 2020-08-01 NOTE — CONSULTS
"  2020      Admit Date: 2020  [facility]  New Patient Consult    Chief Complaint   Patient presents with    Respiratory Arrest     Roommate told EMS that pt had c/o SOB and suddenly collapsed. EMS found pt unresponsive with agonal resps.  AMbu resps in progress on arrival with pulse 80       History of Present Illness:     Pt had been seeing Dr Sharma - was on suboxone.   indicates gets suboxone still from Dr Naranjo got 75 of 8-2 suboxone on .  covid neg.  Called brother Eyal, no answer.          Per Dr Bird's history in er earlier today:  Respiratory Arrest        Roommate told EMS that pt had c/o SOB and suddenly collapsed. EMS found pt unresponsive with agonal resps.  AMbu resps in progress on arrival with pulse 80     60-year-old male past medical history of CAD, mi, hyperlipidemia hypertension brought in by EMS secondary to being found unresponsive.  According to her roommate patient was talking and developed a acute event where he passed out and became unresponsive.  EMS was called and upon arrival patient did have a pulse but unresponsive and therefore in respiratory distress.  Family denies any recent illness and patient is otherwise stable and has no other complaints.          PFSH:  Past Medical History:   Diagnosis Date    Coronary artery disease 12    "massive heart attack"    Heart attack     Hyperlipidemia     Hypertension      Past Surgical History:   Procedure Laterality Date    CORONARY STENT PLACEMENT       Social History     Tobacco Use    Smoking status: Current Every Day Smoker     Packs/day: 1.00    Smokeless tobacco: Never Used   Substance Use Topics    Alcohol use: No    Drug use: No     Types: Marijuana     Family History   Problem Relation Age of Onset    Hypertension Mother     Diabetes Mother     Diabetes Sister     Diabetes Daughter          at 20    Hypertension Maternal Aunt     Heart disease Maternal Uncle     Cancer Neg Hx  " "    Review of patient's allergies indicates:  No Known Allergies         Review of Systems:  due to neurologic status/impairments a Review of Systems could not be obtained       Exam:Comprehensive exam done. /63   Pulse (!) 59   Temp 96.8 °F (36 °C) (Oral)   Resp 19   Ht 5' 6" (1.676 m)   Wt 83.3 kg (183 lb 10.3 oz)   SpO2 98%   BMI 29.64 kg/m²   Exam included Vitals as listed, and patient's appearance and affect and alertness and mood, oral exam for yeast and hygiene and pharynx lesions and Mallapatti (M) score, neck with inspection for jvd and masses and thyroid abnormalities and lymph nodes (supraclavicular and infraclavicular nodes also examined and noted if abn), chest exam included symmetry and effort and fremitus and percussion and auscultation, cardiac exam included rhythm and gallops and murmur and rubs and jvd and edema, abdominal exam for mass and hepatosplenomegaly and tenderness and hernias and bowel sounds, Musculoskeletal exam with muscle tone and posture and mobility/gait and  strenght, and skin for rashes and cyanosis and pallor and turgor, extremity for clubbing.  Findings were normal except as listed below:  Et tube, sedate (reported arouses off propofol), air hunger and prolonged expiration apparent. Tight with faint wheezes, no rales, no edema, distent cor, no clubbing , rest good.    Radiographs reviewed: view by direct vision  - c/w pulm edema.  No results found for this or any previous visit.]    Labs     Recent Labs   Lab 08/01/20  0558   WBC 11.77   HGB 15.6   HCT 49.0        Recent Labs   Lab 08/01/20  0558 08/01/20  0610 08/01/20  0616     --   --    K 3.6  --   --      --   --    CO2 23  --   --    BUN 17  --   --    CREATININE 1.1  --   --    *  --   --    CALCIUM 8.6*  --   --    MG 2.0  --   --    *  --   --    ALT 68*  --   --    ALKPHOS 60  --   --    BILITOT 0.7  --   --    PROT 7.0  --   --    ALBUMIN 4.0  --   --    CRP  --   --  " 0.43   PROCAL  --   --  <0.05   INR 1.0  --   --    LACTATE  --  3.4*  --    TROPONINI 0.036  --   --    *  --   --      Recent Labs   Lab 08/01/20  0739   PH 7.287*   PCO2 58.6*   PO2 67*   HCO3 28.0     Microbiology Results (last 7 days)     Procedure Component Value Units Date/Time    Culture, Respiratory with Gram Stain [[id number]] Collected: 08/01/20 0932    Order Status: Sent Specimen: Respiratory from Sputum Updated: 08/01/20 0933    Culture, Respiratory with Gram Stain [[id number]] Collected: 08/01/20 0631    Order Status: Sent Specimen: Respiratory from Endotracheal Aspirate Updated: 08/01/20 0644    Blood Culture #2 **CANNOT BE ORDERED STAT** [[id number]] Collected: 08/01/20 0616    Order Status: Sent Specimen: Blood from Peripheral, Upper Arm, Right Updated: 08/01/20 0624    Blood Culture #1 **CANNOT BE ORDERED STAT** [[id number]] Collected: 08/01/20 0615    Order Status: Sent Specimen: Blood from Peripheral, Forearm, Right Updated: 08/01/20 0622      Results for ELVI CEJA (MRN [id number]) as of 8/1/2020 08:59   Ref. Range 8/1/2020 06:26   POC PH Latest Ref Range: 7.35 - 7.45  7.147 (LL)   POC PCO2 Latest Ref Range: 35 - 45 mmHg 88.0 (HH)   POC PO2 Latest Ref Range: 80 - 100 mmHg 389 (H)   POC BE Latest Ref Range: -2 to 2 mmol/L 2   POC HCO3 Latest Ref Range: 24 - 28 mmol/L 30.5 (H)   POC SATURATED O2 Latest Ref Range: 95 - 100 % 100   POC TCO2 Latest Ref Range: 23 - 27 mmol/L 33 (H)   FiO2 Unknown 100   Vt Unknown 450   PEEP Unknown 5   Sample Unknown ARTERIAL   DelSys Unknown Adult Vent   Allens Test Unknown N/A   Site Unknown RB   Mode Unknown AC/PRVC   Echo 6/16/2017 CONCLUSIONS     1 - Moderately depressed left ventricular systolic function (EF 30-35%).     2 - Indeterminate LV diastolic function.     3 - Normal right ventricular systolic function .     4 - Very difficult windows, Optison contrast used for wall motion analysis..     5 - Evidence for prior transmural MI involving the  "mid-anterior, anterior apical and apical segments with akinetic thinned walls .             This document has been electronically    SIGNED BY: Marin Lomax MD On: 06/16/2017 18:21     Impression:  Active Hospital Problems    Diagnosis  POA    *Respiratory arrest [R09.2]  Yes    Acute respiratory failure with hypoxia and hypercarbia [J96.01, J96.02]  Yes    Acute exacerbation of systolic CHF(30-35% EF) [I50.21]  Yes    Chronic narcotic dependence [F11.20]  Yes    COPD with acute exacerbation [J44.1]  Yes    Pulmonary edema with congestive heart failure with reduced left ventricular function [I50.1]  Yes    Acute pulmonary edema [J81.0]  Unknown    Hyperlipidemia with target low density lipoprotein (LDL) cholesterol less than 100 mg/dL [E78.5]  Yes    Hypertension [I10]  Yes    Coronary artery disease, shock, CHF, 5 ION stents in LAD, 4/2012 [I25.10]  Yes     "massive heart attack"        Resolved Hospital Problems   No resolved problems to display.               Plan:   covid neg, procal low, bnp 186, severe hypercapnea, no resp meds prior admit?   cxr pulm edema- ef in 2017 low 30-35%. Severe copd suggested at bedside.     Copd rx, cor rx/eval.  Neuro eval.        The following were evaluated and adjusted as needed: mechanical ventilator settings and weaning status, intravenous fluids and nutritional status, sedation and neurologic status, antibiotics, hemodynamics, support tubes and access lines and invasive monitoring, acid base balance and oxygenation needs and input and output and renal status       Critical Care  - THE PATIENT HAS A HIGH PROBABILITY OF IMMINENT OR LIFE THREATENING DETERIORATION.  Over 50%time of encounter was in direct care at bedside.  Time was 30 to 74 minutes required for patient care.  Time needed for all of the above totaled 43 minutes.    "

## 2020-08-01 NOTE — CONSULTS
"Formerly [facility]  Department of Cardiology  Consult Note      PATIENT NAME: Kal Dodd  MRN: [id number]  TODAY'S DATE: 08/01/2020  ADMIT DATE: 8/1/2020                          CONSULT REQUESTED BY: Mallika Tyler MD    SUBJECTIVE     PRINCIPAL PROBLEM: Respiratory arrest      REASON FOR CONSULT:  CHF      HPI:    -Chart Reviewed  -Patient is on ventilator, can answer yes and no questions however he is currently sedated, most history obtained from records.    Mr. Dodd is a 60 year old male patient with a PMH significant for history of HTN, CAD, Dyslipidemia, hepatitis, and CHF last seen on ECHO in 2017 showing evidence at that time of a prior transmural MI and LVEF 30-35%. He was brought to the hospital after having fallen to the ground saying he could not breathe and agonal breathing was noted. EMS activated by his friend and patient was ambu bagged the way to the hospital and intubated in the ED. PH on admission 7.1. PCO2 88. Apparently according to the nurse he had a 23 year old daughter die recently in the past year and was positive for marijuana, benzo's and take suboxone. Patient denies however attempt to hurt himself. He answers no when I ask if he had chest pain prior to falling to the ground. He shakes his head yes when I ask if he had been short of breath for the past several days. CXR shows some interstitial pulmonary edema. BNP only 186.         Review of patient's allergies indicates:  No Known Allergies    Past Medical History:   Diagnosis Date    Coronary artery disease 4/26/12    "massive heart attack"    Heart attack     Hyperlipidemia     Hypertension      Past Surgical History:   Procedure Laterality Date    CORONARY STENT PLACEMENT  4/12     Social History     Tobacco Use    Smoking status: Current Every Day Smoker     Packs/day: 1.00    Smokeless tobacco: Never Used   Substance Use Topics    Alcohol use: No    Drug use: No     Types: Marijuana        REVIEW OF " SYSTEMS  Unable to obtain full ROS  However patient does say yes to SOB over the past several days    OBJECTIVE     VITAL SIGNS (Most Recent)  Temp: 96.8 °F (36 °C) (08/01/20 0913)  Pulse: 60 (08/01/20 0931)  Resp: 19 (08/01/20 0931)  BP: 137/84 (08/01/20 0913)  SpO2: 98 % (08/01/20 0931)    VENTILATION STATUS  Resp: 19 (08/01/20 0931)  SpO2: 98 % (08/01/20 0931)  Vent Mode: A/C  Oxygen Concentration (%):  [] 50  Resp Rate Total:  [16 br/min-22 br/min] 19 br/min  Vt Set:  [450 mL-500 mL] 500 mL  PEEP/CPAP:  [5 cmH20] 5 cmH20  Pressure Support:  [0 cmH20] 0 cmH20  Mean Airway Pressure:  [10 qlE32-73 cmH20] 10 cmH20    I & O (Last 24H):No intake or output data in the 24 hours ending 08/01/20 0938    WEIGHTS  Wt Readings from Last 1 Encounters:   08/01/20 0913 83.3 kg (183 lb 10.3 oz)   08/01/20 0526 86 kg (189 lb 9.5 oz)       PHYSICAL EXAM  GENERAL: well built, well nourished, well-developed in no apparent distress alert and oriented.    HEENT: Orally Intubated Poor oral dentition and partially edentulous,  NECK: No JVD. No bruit..   THYROID: Thyroid not enlarged. No nodules present..   CARDIAC: Regular rate and rhythm. S1 is normal.S2 is normal.No gallops, clicks or murmurs noted at this time.  CHEST ANATOMY: normal.   LUNGS: scattered rhonchi, intubated  ABDOMEN: Soft no masses or organomegaly.  No abdomen pulsations or bruits.  Normal bowel sounds. No pulsations and no masses felt, No guarding or rebound.   URINARY: No cuevas catheter   EXTREMITIES: No cyanosis, clubbing or edema noted at this time., no calf tenderness bilaterally.   PERIPHERAL VASCULAR SYSTEM: Good palpable distal pulses.   CENTRAL NERVOUS SYSTEM: No focal motor or sensory deficits noted.   SKIN: Skin without lesions, moist, well perfused.   MUSCLE STRENGTH & TONE: No noteable weakness, atrophy or abnormal movement.     HOME MEDICATIONS:  No current facility-administered medications on file prior to encounter.      Current Outpatient  Medications on File Prior to Encounter   Medication Sig Dispense Refill    aspirin (ECOTRIN) 81 MG EC tablet Take 81 mg by mouth once daily.        buprenorphine-naloxone (SUBOXONE) 8-2 mg Film Place under the tongue once daily.      fenofibrate 160 MG Tab Take 1 tablet (160 mg total) by mouth once daily. 90 tablet 0    fluticasone propionate (FLONASE) 50 mcg/actuation nasal spray 1 spray (50 mcg total) by Each Nare route once daily. 1 Bottle 2    furosemide (LASIX) 20 MG tablet Take 1 tablet (20 mg total) by mouth 2 (two) times daily. 60 tablet 11    hydrOXYzine pamoate (VISTARIL) 25 MG Cap Take 1 capsule (25 mg total) by mouth 4 (four) times daily. 30 capsule 0    lisinopril (PRINIVIL,ZESTRIL) 40 MG tablet Take 1 tablet (40 mg total) by mouth once daily. (Patient taking differently: Take 40 mg by mouth once daily. Pt taking 1/2 tab once daily) 90 tablet 3    lovastatin (MEVACOR) 20 MG tablet Take 1 tablet by mouth once daily 90 tablet 0    metoprolol succinate (TOPROL-XL) 25 MG 24 hr tablet Take 1 tablet (25 mg total) by mouth once daily. 30 tablet 11    nitroGLYCERIN (NITROSTAT) 0.4 MG SL tablet Place 1 tablet (0.4 mg total) under the tongue every 5 (five) minutes as needed for Chest pain. 30 tablet 1    nystatin (DUKE'S SOLUTION) Take 10 mLs by mouth 4 (four) times daily. 120 mL 0    spironolactone (ALDACTONE) 25 MG tablet Take 1 tablet (25 mg total) by mouth once daily. 30 tablet 11    [DISCONTINUED] DULoxetine (CYMBALTA) 30 MG capsule Take 1 capsule (30 mg total) by mouth once daily. 90 capsule 1       SCHEDULED MEDS:   albuterol-ipratropium  3 mL Nebulization Q6H    aspirin  81 mg Oral Daily    atorvastatin  20 mg Oral QHS    enoxaparin  40 mg Subcutaneous Q24H    fluticasone propionate  1 spray Each Nostril Daily    furosemide (LASIX) IV  40 mg Intravenous Q12H    metoprolol succinate  25 mg Oral Daily    polyethylene glycol  17 g Oral Daily    potassium chloride 10%  40 mEq Oral Once     senna-docusate 8.6-50 mg  1 tablet Oral BID       CONTINUOUS INFUSIONS:   nitroGLYCERIN Stopped (08/01/20 0708)       PRN MEDS:acetaminophen, calcium chloride IVPB, calcium chloride IVPB, calcium chloride IVPB, dextrose 50%, dextrose 50%, glucagon (human recombinant), glucose, glucose, HYDROcodone-acetaminophen, insulin aspart U-100, magnesium oxide, magnesium sulfate IVPB, magnesium sulfate IVPB, magnesium sulfate IVPB, magnesium sulfate IVPB, melatonin, morphine, ondansetron, potassium chloride in water, potassium chloride in water, potassium chloride in water, potassium chloride in water, potassium chloride, potassium chloride, potassium chloride, potassium chloride, promethazine (PHENERGAN) IVPB, sodium chloride 0.9%, sodium phosphate IVPB, sodium phosphate IVPB, sodium phosphate IVPB, sodium phosphate IVPB, sodium phosphate IVPB    LABS AND DIAGNOSTICS     CBC LAST 3 DAYS  Recent Labs   Lab 08/01/20 0558   WBC 11.77   RBC 5.40   HGB 15.6   HCT 49.0   MCV 91   MCH 28.9   MCHC 31.8*   RDW 13.2      MPV 9.0*   GRAN 31.0*   LYMPH 67.0*   MONO 0.0*   NRBC 0       COAGULATION LAST 3 DAYS  Recent Labs   Lab 08/01/20  0558   LABPT 13.0   INR 1.0       CHEMISTRY LAST 3 DAYS  Recent Labs   Lab 08/01/20 0558 08/01/20  0626 08/01/20  0739     --   --    K 3.6  --   --      --   --    CO2 23  --   --    ANIONGAP 14  --   --    BUN 17  --   --    CREATININE 1.1  --   --    *  --   --    CALCIUM 8.6*  --   --    PH  --  7.147* 7.287*   MG 2.0  --   --    ALBUMIN 4.0  --   --    PROT 7.0  --   --    ALKPHOS 60  --   --    ALT 68*  --   --    *  --   --    BILITOT 0.7  --   --        CARDIAC PROFILE LAST 3 DAYS  Recent Labs   Lab 08/01/20  0558   *   TROPONINI 0.036       ENDOCRINE LAST 3 DAYS  Recent Labs   Lab 08/01/20  0616   PROCAL <0.05       LAST ARTERIAL BLOOD GAS  ABG  Recent Labs   Lab 08/01/20  0739   PH 7.287*   PO2 67*   PCO2 58.6*   HCO3 28.0   BE 1       LAST 7 DAYS  MICROBIOLOGY   Microbiology Results (last 7 days)     Procedure Component Value Units Date/Time    Culture, Respiratory with Gram Stain [[id number]] Collected: 08/01/20 0932    Order Status: Sent Specimen: Respiratory from Sputum Updated: 08/01/20 0933    Culture, Respiratory with Gram Stain [[id number]] Collected: 08/01/20 0631    Order Status: Sent Specimen: Respiratory from Endotracheal Aspirate Updated: 08/01/20 0644    Blood Culture #2 **CANNOT BE ORDERED STAT** [[id number]] Collected: 08/01/20 0616    Order Status: Sent Specimen: Blood from Peripheral, Upper Arm, Right Updated: 08/01/20 0624    Blood Culture #1 **CANNOT BE ORDERED STAT** [[id number]] Collected: 08/01/20 0615    Order Status: Sent Specimen: Blood from Peripheral, Forearm, Right Updated: 08/01/20 0622          MOST RECENT IMAGING  CT Head Without Contrast  Narrative: EXAMINATION:  CT HEAD WITHOUT CONTRAST    CLINICAL HISTORY:  Altered mental status; respiratory arrest.    TECHNIQUE:  CMS MANDATED QUALITY DATA-CT RADIATION DOSE-436    All CT scans at this facility dose modulation, iterative reconstruction, and or weight-based dosing when appropriate to reduce radiation dose to as low as reasonably achievable.    FINDINGS:  No prior studies for comparison.  There is no acute intracranial hemorrhage, with no mass effect or abnormal extra-axial fluid.  Minimal scattered areas of nonspecific hypoattenuation involve the deep periventricular white matter, with gray-white differentiation maintained.    The cortical sulci and ventricles are normal in size for age.  The cerebellum and brainstem are unremarkable.  There is paranasal sinus mucosal thickening.  The mastoid air cells are clear. There is no acute osseous abnormality.  Impression: 1. Minimal scattered areas of nonspecific white matter hypoattenuation, suggesting chronic small vessel ischemic disease.  2. No acute intracranial hemorrhage, mass effect, or loss of gray-white  differentiation.    Electronically signed by: Douglas Eubanks MD  Date:    08/01/2020  Time:    09:20  X-Ray Chest AP Portable  Narrative: EXAMINATION:  XR CHEST AP PORTABLE    CLINICAL HISTORY:  Coronary artery disease, previous myocardial infarction, respiratory arrest    FINDINGS:  Portable chest radiograph at 05:58 hours compared to 03/07/2017 shows ET tube at the level of T5, within 2 cm of the tawanda, with nasogastric tube with distal portion beyond the inferior margin of the radiograph, at least to the level of the gastroesophageal junction.  The cardiac silhouette is upper limits of normal in size, with the pulmonary vasculature prominent centrally.  There are changes of prior coronary endovascular stenting.    The lungs are symmetrically expanded, with diffuse reticulonodular densities throughout both lungs suggesting interstitial pulmonary edema.  There is no large pleural effusion or pneumothorax.  Impression: 1. Support devices in position as above.  2. Diffuse interstitial pulmonary edema.    Electronically signed by: Douglas Eubanks MD  Date:    08/01/2020  Time:    06:48      ECHOCARDIOGRAM RESULTS (last 5)  No results found for this or any previous visit.    CURRENT/PREVIOUS VISIT EKG  Results for orders placed or performed during the hospital encounter of 08/01/20   EKG 12-lead    Collection Time: 08/01/20  6:29 AM    Narrative    Test Reason : R09.2,    Vent. Rate : 081 BPM     Atrial Rate : 081 BPM     P-R Int : 186 ms          QRS Dur : 114 ms      QT Int : 386 ms       P-R-T Axes : 071 087 248 degrees     QTc Int : 448 ms    Normal sinus rhythm  Biatrial enlargement  Anterior infarct (cited on or before 18-MAY-2017)  ST and T wave abnormality, consider inferolateral ischemia  Abnormal ECG  When compared with ECG of 01-AUG-2020 05:30,  No significant change was found    Referred By: DORITA ERIC           Confirmed By:            ASSESSMENT/PLAN:     Active Hospital Problems    Diagnosis    *Respiratory  "arrest    Acute respiratory failure with hypoxia and hypercarbia    Acute exacerbation of systolic CHF(30-35% EF)    Hyperlipidemia with target low density lipoprotein (LDL) cholesterol less than 100 mg/dL    Hypertension    Coronary artery disease, shock, CHF, 5 ION stents in LAD, 4/2012     "massive heart attack"         ASSESSMENT & PLAN:     1. Acute Respiratory Failure with Hypoxia and Hypercapnia  2. Acute on Chronic CHF with reduced ejection fraction- HF-r-EF  3. CAD S/P MI in 2012  4. Transaminitis- shock liver?   5. Vitamin D Deficiency  6. H/O LVEF 30-35% in 2017        RECOMMENDATIONS:    ECHO  Agree with IV lasix BID for now  Trend labs  DC Statin therapy in view of elevated liver enzymes  PLEASE EXERCISE CAUTION USING HEPATIC TOXIC DRUGS  CORRECT ELECTROLYTES ATTEMPT TO KEEP POTASSIUM GREATER THAN 4.0  And magnesium greater than 2.0  Will follow  Thank you for the consultation      Ni Diggs NP  [facility]  Department of Cardiology  Date of Service: 08/01/2020  9:38 AM    I have personally interviewed and examined the patient, I have reviewed the Nurse Practitioner's history and physical, assessment, and plan. I have personally evaluated the patient at bedside and agree with the findings.  "

## 2020-08-01 NOTE — NURSING
Pt received to room 2212 from Er. Report received from Solange RUBIN. Pt attached to monitor. Pt currently has propofol infusing at 83 mcg/kg/hr. Pt is awake, following commands, nods head appropriately to questions asked and BRANHAM. See nursing exam for further assessment.

## 2020-08-01 NOTE — SUBJECTIVE & OBJECTIVE
"Past Medical History:   Diagnosis Date    Coronary artery disease 4/26/12    "massive heart attack"    Heart attack     Hyperlipidemia     Hypertension        Past Surgical History:   Procedure Laterality Date    CORONARY STENT PLACEMENT  4/12       Review of patient's allergies indicates:  No Known Allergies    No current facility-administered medications on file prior to encounter.      Current Outpatient Medications on File Prior to Encounter   Medication Sig    aspirin (ECOTRIN) 81 MG EC tablet Take 81 mg by mouth once daily.      buprenorphine-naloxone (SUBOXONE) 8-2 mg Film Place under the tongue once daily.    fenofibrate 160 MG Tab Take 1 tablet (160 mg total) by mouth once daily.    fluticasone propionate (FLONASE) 50 mcg/actuation nasal spray 1 spray (50 mcg total) by Each Nare route once daily.    furosemide (LASIX) 20 MG tablet Take 1 tablet (20 mg total) by mouth 2 (two) times daily.    hydrOXYzine pamoate (VISTARIL) 25 MG Cap Take 1 capsule (25 mg total) by mouth 4 (four) times daily.    lisinopril (PRINIVIL,ZESTRIL) 40 MG tablet Take 1 tablet (40 mg total) by mouth once daily. (Patient taking differently: Take 40 mg by mouth once daily. Pt taking 1/2 tab once daily)    lovastatin (MEVACOR) 20 MG tablet Take 1 tablet by mouth once daily    metoprolol succinate (TOPROL-XL) 25 MG 24 hr tablet Take 1 tablet (25 mg total) by mouth once daily.    nitroGLYCERIN (NITROSTAT) 0.4 MG SL tablet Place 1 tablet (0.4 mg total) under the tongue every 5 (five) minutes as needed for Chest pain.    nystatin (DUKE'S SOLUTION) Take 10 mLs by mouth 4 (four) times daily.    spironolactone (ALDACTONE) 25 MG tablet Take 1 tablet (25 mg total) by mouth once daily.    [DISCONTINUED] DULoxetine (CYMBALTA) 30 MG capsule Take 1 capsule (30 mg total) by mouth once daily.     Family History     Problem Relation (Age of Onset)    Diabetes Mother, Sister, Daughter    Heart disease Maternal Uncle    Hypertension " Mother, Maternal Aunt        Tobacco Use    Smoking status: Current Every Day Smoker     Packs/day: 1.00    Smokeless tobacco: Never Used   Substance and Sexual Activity    Alcohol use: No    Drug use: No     Types: Marijuana    Sexual activity: Not Currently     Review of Systems   Unable to perform ROS: Intubated     Objective:     Vital Signs (Most Recent):  Temp: 96.8 °F (36 °C) (08/01/20 0913)  Pulse: (!) 59 (08/01/20 0942)  Resp: 19 (08/01/20 0931)  BP: 115/63 (08/01/20 0942)  SpO2: 98 % (08/01/20 0931) Vital Signs (24h Range):  Temp:  [96.8 °F (36 °C)-98.5 °F (36.9 °C)] 96.8 °F (36 °C)  Pulse:  [59-94] 59  Resp:  [0-28] 19  SpO2:  [84 %-100 %] 98 %  BP: (105-234)/() 115/63     Weight: 83.3 kg (183 lb 10.3 oz)  Body mass index is 29.64 kg/m².    Physical Exam  Vitals signs and nursing note reviewed.   Constitutional:       Appearance: He is well-developed.      Comments: Intubated, sedated however follows simple commands, gives thumbs up   HENT:      Head: Normocephalic and atraumatic.      Nose: Nose normal.      Mouth/Throat:      Comments: ET in place  Neck:      Musculoskeletal: Normal range of motion and neck supple.      Vascular: No JVD.   Cardiovascular:      Rate and Rhythm: Normal rate and regular rhythm.      Pulses: Normal pulses.      Heart sounds: Normal heart sounds. No murmur. No gallop.       Comments: Tachycardic, bilateral crackles up to mid lung field  Pulmonary:      Effort: No respiratory distress.      Breath sounds: No wheezing.      Comments: Intubated, sedated, bilateral basal crackles  Abdominal:      General: Abdomen is flat. Bowel sounds are normal. There is no distension.      Palpations: Abdomen is soft.      Tenderness: There is no guarding or rebound.      Comments: OG in place   Genitourinary:     Comments: Marie in place with clear urine in the bed  Musculoskeletal: Normal range of motion.   Lymphadenopathy:      Cervical: No cervical adenopathy.   Skin:      General: Skin is warm.      Capillary Refill: Capillary refill takes less than 2 seconds.      Findings: No rash.   Neurological:      Cranial Nerves: No cranial nerve deficit.      Comments: Intubated on propofol however follows simple commands   Psychiatric:      Comments: Unable to assess             Significant Labs: All pertinent labs within the past 24 hours have been reviewed.    Significant Imaging: I have reviewed all pertinent imaging results/findings within the past 24 hours.

## 2020-08-01 NOTE — ED NOTES
Rec'd patient in stable condition, on vent, vent settings per RT.  Pt twitching but not following commands with no sedation.

## 2020-08-01 NOTE — H&P
"Formerly [facility] Medicine  History & Physical    DOS: 08/01/2020 at 09:10am    Patient Name: Kal Dodd  MRN: [id number]  Admission Date: 8/1/2020  Attending Physician: Mallika Tyler MD   Primary Care Provider: Ras Sharma MD         Patient information was obtained from EMS personnel, past medical records and ER records.     Subjective:     Principal Problem:Respiratory arrest    Chief Complaint:   Chief Complaint   Patient presents with    Respiratory Arrest     Roommate told EMS that pt had c/o SOB and suddenly collapsed. EMS found pt unresponsive with agonal resps.  AMbu resps in progress on arrival with pulse 80        HPI: 60-year-old gentleman with past medical history of CAD status post stenting in 2012, chronic systolic CHF(30-35% EF), COPD, heavy tobacco use, opiate dependent, niddm, hyperlipidemia was brought by EMS due to respiratory arrest.  Upon my assessment patient is already intubated and on propofol.  History was obtained from ER physician.  As per my report from ED provider patient was complaining of shortness of breath at home and sudden collapse.  Friend called EMS and he was found to be unresponsive with agonal respiration.  Ambu bag was placed and was intubated upon arrival to ED due to severe hypoxia and hypercapnia.  According to ED physician patient has some generalized shakes that quickly resolved with propofol and immediately patient was following commands on propofol and still does on my assessment.  Patient did not lose pulse as per EMS.  No family member available at bedside.  Full review of system is not possible due to intubation and sedation.     Past Medical History:   Diagnosis Date    Coronary artery disease 4/26/12    "massive heart attack"    Heart attack     Hyperlipidemia     Hypertension        Past Surgical History:   Procedure Laterality Date    CORONARY STENT PLACEMENT  4/12       Review of patient's allergies indicates:  No Known " Allergies    No current facility-administered medications on file prior to encounter.      Current Outpatient Medications on File Prior to Encounter   Medication Sig    aspirin (ECOTRIN) 81 MG EC tablet Take 81 mg by mouth once daily.      buprenorphine-naloxone (SUBOXONE) 8-2 mg Film Place under the tongue once daily.    fenofibrate 160 MG Tab Take 1 tablet (160 mg total) by mouth once daily.    fluticasone propionate (FLONASE) 50 mcg/actuation nasal spray 1 spray (50 mcg total) by Each Nare route once daily.    furosemide (LASIX) 20 MG tablet Take 1 tablet (20 mg total) by mouth 2 (two) times daily.    hydrOXYzine pamoate (VISTARIL) 25 MG Cap Take 1 capsule (25 mg total) by mouth 4 (four) times daily.    lisinopril (PRINIVIL,ZESTRIL) 40 MG tablet Take 1 tablet (40 mg total) by mouth once daily. (Patient taking differently: Take 40 mg by mouth once daily. Pt taking 1/2 tab once daily)    lovastatin (MEVACOR) 20 MG tablet Take 1 tablet by mouth once daily    metoprolol succinate (TOPROL-XL) 25 MG 24 hr tablet Take 1 tablet (25 mg total) by mouth once daily.    nitroGLYCERIN (NITROSTAT) 0.4 MG SL tablet Place 1 tablet (0.4 mg total) under the tongue every 5 (five) minutes as needed for Chest pain.    nystatin (DUKE'S SOLUTION) Take 10 mLs by mouth 4 (four) times daily.    spironolactone (ALDACTONE) 25 MG tablet Take 1 tablet (25 mg total) by mouth once daily.    [DISCONTINUED] DULoxetine (CYMBALTA) 30 MG capsule Take 1 capsule (30 mg total) by mouth once daily.     Family History     Problem Relation (Age of Onset)    Diabetes Mother, Sister, Daughter    Heart disease Maternal Uncle    Hypertension Mother, Maternal Aunt        Tobacco Use    Smoking status: Current Every Day Smoker     Packs/day: 1.00    Smokeless tobacco: Never Used   Substance and Sexual Activity    Alcohol use: No    Drug use: No     Types: Marijuana    Sexual activity: Not Currently     Review of Systems   Unable to perform ROS:  Intubated     Objective:     Vital Signs (Most Recent):  Temp: 96.8 °F (36 °C) (08/01/20 0913)  Pulse: (!) 59 (08/01/20 0942)  Resp: 19 (08/01/20 0931)  BP: 115/63 (08/01/20 0942)  SpO2: 98 % (08/01/20 0931) Vital Signs (24h Range):  Temp:  [96.8 °F (36 °C)-98.5 °F (36.9 °C)] 96.8 °F (36 °C)  Pulse:  [59-94] 59  Resp:  [0-28] 19  SpO2:  [84 %-100 %] 98 %  BP: (105-234)/() 115/63     Weight: 83.3 kg (183 lb 10.3 oz)  Body mass index is 29.64 kg/m².    Physical Exam  Vitals signs and nursing note reviewed.   Constitutional:       Appearance: He is well-developed.      Comments: Intubated, sedated however follows simple commands, gives thumbs up   HENT:      Head: Normocephalic and atraumatic.      Nose: Nose normal.      Mouth/Throat:      Comments: ET in place  Neck:      Musculoskeletal: Normal range of motion and neck supple.      Vascular: No JVD.   Cardiovascular:      Rate and Rhythm: Normal rate and regular rhythm.      Pulses: Normal pulses.      Heart sounds: Normal heart sounds. No murmur. No gallop.       Comments: Tachycardic, bilateral crackles up to mid lung field  Pulmonary:      Effort: No respiratory distress.      Breath sounds: No wheezing.      Comments: Intubated, sedated, bilateral basal crackles  Abdominal:      General: Abdomen is flat. Bowel sounds are normal. There is no distension.      Palpations: Abdomen is soft.      Tenderness: There is no guarding or rebound.      Comments: OG in place   Genitourinary:     Comments: Marie in place with clear urine in the bed  Musculoskeletal: Normal range of motion.   Lymphadenopathy:      Cervical: No cervical adenopathy.   Skin:     General: Skin is warm.      Capillary Refill: Capillary refill takes less than 2 seconds.      Findings: No rash.   Neurological:      Cranial Nerves: No cranial nerve deficit.      Comments: Intubated on propofol however follows simple commands   Psychiatric:      Comments: Unable to assess             Significant  "Labs: All pertinent labs within the past 24 hours have been reviewed.    Significant Imaging: I have reviewed all pertinent imaging results/findings within the past 24 hours.    Assessment/Plan:     60-year-old gentleman with past medical history of systolic CHF(30-35% EF), CAD status post stenting in 2012, COPD, heavy tobacco dependence, opiate dependent came after encountering respiratory arrest at home was intubated in ED.    Assessment:  Active Hospital Problems    Diagnosis  POA    *Respiratory arrest [R09.2]  Yes     Priority: 1 - High    Acute respiratory failure with hypoxia and hypercarbia [J96.01, J96.02]  Yes     Priority: 2     Acute exacerbation of systolic CHF(30-35% EF) [I50.21]  Yes     Priority: 3     Pulmonary edema with congestive heart failure with reduced left ventricular function [I50.1]  Yes     Priority: 4     Coronary artery disease, shock, CHF, 5 ION stents in LAD, 4/2012 [I25.10]  Yes     Priority: 5      "massive heart attack"      COPD with acute exacerbation [J44.1]  Yes     Priority: 6     Chronic narcotic dependence [F11.20]  Yes    Acute pulmonary edema [J81.0]  Unknown    Hyperlipidemia with target low density lipoprotein (LDL) cholesterol less than 100 mg/dL [E78.5]  Yes    Hypertension [I10]  Yes      Resolved Hospital Problems   No resolved problems to display.         Plan:  Acute hypoxic and hypercapnic respiratory failure likely due to systolic CHF exacerbation and some component of COPD exacerbation.  No suspicion for sepsis or pneumonia.  Lactic acid elevation is likely due to hypoxia.  Procalcitonin is negative.  COVID-19 rapid screen negative    Patient was intubated in ED.  Consult pulmonary for vent management    IV diuresis with aggressive electrolyte replacement, daily weight, accurate charting of urine output    Serial EKGs, cardiac enzymes, telemetry monitoring, 2D echo, cardiology consult.  In the past patient saw Dr. Berwer    Bronchodilators, supplemental " oxygen, low-dose steroid    Hold lisinopril and Aldactone in light of aggressive IV diuresis    Resume home medications    Wean ventilator as tolerated    Basal bolus insulin regimen, Accuchecks    Neurology was consulted by ED physician due to some rhythmic tonic clonic movements however that could be metabolic due to hypoxia.  CT head did not show any acute pathology, EEG in progress    Further management as per clinical course and consultant's recommendations       VTE Risk Mitigation (From admission, onward)         Ordered     enoxaparin injection 40 mg  Every 24 hours      08/01/20 0849     IP VTE HIGH RISK PATIENT  Once      08/01/20 0849     Place sequential compression device  Until discontinued      08/01/20 0849                   Mallika Tyler MD  Department of Hospital Medicine   [facility]

## 2020-08-01 NOTE — ED NOTES
Pt now following simple commands, answering yes/no questions with head movements.  Unable to squeeze my hand, but he can move the correct arm he is told to.  NTG stopped per Dr. Bird and Propofol started for sedation.  Drops in sats noted frequently, improved by in-line suction and HOB >30.  Pt resting comfortably at this time.

## 2020-08-01 NOTE — ED PROVIDER NOTES
"Encounter Date: 2020       History     Chief Complaint   Patient presents with    Respiratory Arrest     Roommate told EMS that pt had c/o SOB and suddenly collapsed. EMS found pt unresponsive with agonal resps.  AMbu resps in progress on arrival with pulse 80     60-year-old male past medical history of CAD, mi, hyperlipidemia hypertension brought in by EMS secondary to being found unresponsive.  According to her roommate patient was talking and developed a acute event where he passed out and became unresponsive.  EMS was called and upon arrival patient did have a pulse but unresponsive and therefore in respiratory distress.  Family denies any recent illness and patient is otherwise stable and has no other complaints.        Review of patient's allergies indicates:  No Known Allergies  Past Medical History:   Diagnosis Date    Coronary artery disease 12    "massive heart attack"    Heart attack     Hyperlipidemia     Hypertension      Past Surgical History:   Procedure Laterality Date    CORONARY STENT PLACEMENT       Family History   Problem Relation Age of Onset    Hypertension Mother     Diabetes Mother     Diabetes Sister     Diabetes Daughter          at 20    Hypertension Maternal Aunt     Heart disease Maternal Uncle     Cancer Neg Hx      Social History     Tobacco Use    Smoking status: Current Every Day Smoker     Packs/day: 1.00    Smokeless tobacco: Never Used   Substance Use Topics    Alcohol use: No    Drug use: No     Types: Marijuana     Review of Systems   Unable to perform ROS: Patient unresponsive       Physical Exam     Initial Vitals   BP Pulse Resp Temp SpO2   20 0526 20 0526 20 0526 20 0723 20 0526   (!) 224/112 80 (!) 0 98.5 °F (36.9 °C) (!) 84 %      MAP       --                Physical Exam    Nursing note and vitals reviewed.  Constitutional: He appears well-developed and well-nourished. He is not diaphoretic. He appears " distressed.   HENT:   Head: Normocephalic and atraumatic.   Mouth/Throat: Oropharynx is clear and moist.   Eyes: Conjunctivae and EOM are normal. Pupils are equal, round, and reactive to light.   Neck: Normal range of motion. Neck supple. No thyromegaly present. No JVD present.   Cardiovascular: Normal rate, regular rhythm and normal heart sounds. Exam reveals no gallop and no friction rub.    No murmur heard.  Pulmonary/Chest: Breath sounds normal. He is in respiratory distress. He has no wheezes. He exhibits no tenderness.   Abdominal: Soft. Bowel sounds are normal. He exhibits no distension. There is no abdominal tenderness. There is no rebound and no guarding.   Musculoskeletal: Normal range of motion. No tenderness or edema.   Neurological: He is unresponsive. GCS eye subscore is 3. GCS verbal subscore is 1. GCS motor subscore is 1.   Skin: Skin is warm and dry. Capillary refill takes less than 2 seconds. No rash noted. No erythema.   Psychiatric: He has a normal mood and affect.         ED Course   Intubation    Date/Time: 8/1/2020 5:46 AM  Location procedure was performed: [facility] EMERGENCY DEPARTMENT  Performed by: Montana Olguin MD  Authorized by: Montana Olguin MD   Consent Done: Emergent Situation  Indications: respiratory failure,  respiratory distress and  airway protection  Intubation method: video-assisted  Patient status: paralyzed (RSI)  Preoxygenation: BVM  Sedatives: etomidate  Paralytic: rocuronium  Laryngoscope size: Glide 4  Tube size: 7.5 mm  Tube type: cuffed  Number of attempts: 1  Ventilation between attempts: BVM  Cricoid pressure: no  Cords visualized: yes  Post-procedure assessment: chest rise and ETCO2 monitor  Breath sounds: clear  Cuff inflated: yes  ETT to lip: 22 cm  ETT to teeth: 21 cm  Tube secured with: ETT alvarez  Chest x-ray interpreted by me.  Chest x-ray findings: endotracheal tube in appropriate position  Patient tolerance: Patient tolerated the procedure well with no  immediate complications    Critical Care    Date/Time: 8/1/2020 5:53 AM  Performed by: Monatna Olguin MD  Authorized by: Montana Olguin MD   Direct patient critical care time: 25 minutes  Additional history critical care time: 15 minutes  Ordering / reviewing critical care time: 15 minutes  Documentation critical care time: 15 minutes  Consulting other physicians critical care time: 10 minutes  Consult with family critical care time: 0 minutes  Total critical care time (exclusive of procedural time) : 80 minutes  Critical care was necessary to treat or prevent imminent or life-threatening deterioration of the following conditions: respiratory failure.  Critical care was time spent personally by me on the following activities: discussions with consultants, evaluation of patient's response to treatment, ordering and review of laboratory studies, review of old charts, re-evaluation of patient's condition, ordering and review of radiographic studies, ordering and performing treatments and interventions, examination of patient, development of treatment plan with patient or surrogate and obtaining history from patient or surrogate.        Labs Reviewed   CBC W/ AUTO DIFFERENTIAL - Abnormal; Notable for the following components:       Result Value    Mean Corpuscular Hemoglobin Conc 31.8 (*)     MPV 9.0 (*)     Gran% 31.0 (*)     Lymph% 67.0 (*)     Mono% 0.0 (*)     All other components within normal limits   COMPREHENSIVE METABOLIC PANEL - Abnormal; Notable for the following components:    Glucose 230 (*)     Calcium 8.6 (*)      (*)     ALT 68 (*)     All other components within normal limits   B-TYPE NATRIURETIC PEPTIDE - Abnormal; Notable for the following components:     (*)     All other components within normal limits   URINALYSIS, REFLEX TO URINE CULTURE - Abnormal; Notable for the following components:    Protein, UA 1+ (*)     All other components within normal limits    Narrative:     Specimen  Source->Urine   LACTIC ACID, PLASMA - Abnormal; Notable for the following components:    Lactate (Lactic Acid) 3.4 (*)     All other components within normal limits    Narrative:     Lactic Acid critical result(s) repeated. Called and verbal readback   obtained from Jie Núñez RN Er.  by CW1 08/01/2020 06:46   FERRITIN - Abnormal; Notable for the following components:    Ferritin 503 (*)     All other components within normal limits   URINALYSIS MICROSCOPIC - Abnormal; Notable for the following components:    Hyaline Casts, UA 14 (*)     All other components within normal limits    Narrative:     Specimen Source->Urine   ISTAT PROCEDURE - Abnormal; Notable for the following components:    POC PH 7.147 (*)     POC PCO2 88.0 (*)     POC PO2 389 (*)     POC HCO3 30.5 (*)     POC TCO2 33 (*)     All other components within normal limits   ISTAT PROCEDURE - Abnormal; Notable for the following components:    POC PH 7.287 (*)     POC PCO2 58.6 (*)     POC PO2 67 (*)     POC SATURATED O2 90 (*)     POC TCO2 30 (*)     All other components within normal limits   CULTURE, BLOOD   CULTURE, BLOOD   CULTURE, RESPIRATORY   SARS-COV-2 RNA AMPLIFICATION, QUAL   TROPONIN I   PROTIME-INR   MAGNESIUM   DRUG SCREEN PANEL, URINE EMERGENCY    Narrative:     Specimen Source->Urine   C-REACTIVE PROTEIN   PROCALCITONIN        ECG Results          EKG 12-lead (In process)  Result time 08/01/20 06:43:18    In process by Interface, Lab In [facility] (08/01/20 06:43:18)                 Narrative:    Test Reason : R09.2,    Vent. Rate : 081 BPM     Atrial Rate : 081 BPM     P-R Int : 186 ms          QRS Dur : 114 ms      QT Int : 386 ms       P-R-T Axes : 071 087 248 degrees     QTc Int : 448 ms    Normal sinus rhythm  Biatrial enlargement  Anterior infarct (cited on or before 18-MAY-2017)  ST and T wave abnormality, consider inferolateral ischemia  Abnormal ECG  When compared with ECG of 01-AUG-2020 05:30,  No significant change was  found    Referred By: DORITA ERIC           Confirmed By:                              EKG 12-lead (In process)  Result time 08/01/20 05:57:35    In process by Interface, Lab In [facility] (08/01/20 05:57:35)                 Narrative:    Test Reason : R07.9,    Vent. Rate : 107 BPM     Atrial Rate : 107 BPM     P-R Int : 164 ms          QRS Dur : 114 ms      QT Int : 362 ms       P-R-T Axes : 078 090 270 degrees     QTc Int : 483 ms    Sinus tachycardia  Possible Left atrial enlargement  Rightward axis  Anterior infarct ,age undetermined  ST and T wave abnormality, consider inferolateral ischemia  Abnormal ECG  No previous ECGs available    Referred By: DORITA ERIC           Confirmed By:                             Imaging Results          X-Ray Chest AP Portable (Final result)  Result time 08/01/20 06:48:46    Final result by Douglas Eubanks MD (08/01/20 06:48:46)                 Impression:      1. Support devices in position as above.  2. Diffuse interstitial pulmonary edema.      Electronically signed by: Douglas Eubanks MD  Date:    08/01/2020  Time:    06:48             Narrative:    EXAMINATION:  XR CHEST AP PORTABLE    CLINICAL HISTORY:  Coronary artery disease, previous myocardial infarction, respiratory arrest    FINDINGS:  Portable chest radiograph at 05:58 hours compared to 03/07/2017 shows ET tube at the level of T5, within 2 cm of the tawanda, with nasogastric tube with distal portion beyond the inferior margin of the radiograph, at least to the level of the gastroesophageal junction.  The cardiac silhouette is upper limits of normal in size, with the pulmonary vasculature prominent centrally.  There are changes of prior coronary endovascular stenting.    The lungs are symmetrically expanded, with diffuse reticulonodular densities throughout both lungs suggesting interstitial pulmonary edema.  There is no large pleural effusion or pneumothorax.                                 Medical Decision Making:    Initial Assessment:   [age] male on initial assessment in severe distress.  Patient is alert with GCS 5 and vitals show hypertension and tachycardia.  Differential Diagnosis:   MI, PE, TIA, stroke, electrolyte abnormality, DKA, sepsis  Independently Interpreted Test(s):   I have ordered and independently interpreted X-rays - see prior notes.  I have ordered and independently interpreted EKG Reading(s) - see prior notes  Clinical Tests:   Lab Tests: Ordered and Reviewed  The following lab test(s) were unremarkable: CBC, CMP, Urinalysis, Troponin, BNP, Lactate and D-Dimer  Radiological Study: Ordered and Reviewed  Medical Tests: Ordered and Reviewed              Attending Attestation:         Attending Critical Care:   Critical Care Times:   Direct Patient Care (initial evaluation, reassessments, and time considering the case)................................................................35 minutes.   Additional History from reviewing old medical records or taking additional history from the family, EMS, PCP, etc.......................5 minutes.   Ordering, Reviewing, and Interpreting Diagnostic Studies...............................................................................................................5 minutes.   Documentation..................................................................................................................................................................................10 minutes.   Consultation with other Physicians. .................................................................................................................................................10 minutes.   Consultation with the patient's family directly relating to the patient's condition, care, and DNR status (when patient unable)......5 minutes.   ==============================================================  · Total Critical Care Time - exclusive of procedural time: 70  minutes.  ==============================================================  The following critical care procedures were done by me (see procedure notes): airway management.   Critical care was time spent personally by me on the following activities: obtaining history from patient or relative, examination of patient, review of old charts, ordering lab, x-rays, and/or EKG, development of treatment plan with patient or relative, ordering and performing treatments and interventions, evaluation of patient's response to treatment, discussion with consultants, interpretation of cardiac measurements, re-evaluation of patient's conition and ventilator management.   Critical Care Condition: critical       Attending ED Notes:   Assumed care of patient at 6:00 a.m., patient intubated for apparent sudden-onset shortness of breath with respiratory arrest, review of patient's chest x-ray shows endotracheal tube in good position, patient noted to have bilateral patchy pulmonary opacities, patient is COVID negative, patient has EKG which does show an elevation in V3 and V4 however review of previous EKGs shows this is a chronic finding, patient also has T-wave inversions in the lateral and septal leads which is also a chronic finding.  It is unclear what caused respiratory arrest however given apparent fluid on lungs I am suspicious for acute pulmonary edema.  Patient's blood gas shows acidosis pH 7.14 PCO2 88 Po2 389.  Ventilation settings were adjusted with increased respiratory rate and slightly increasing tidal volume, will continue to monitor closely and reassess ABG.  Patient will be consulted to Internal Medicine for admission to ICU.    Called to bedside, patient appeared to be making rhythmic movements of head arm and legs, blood pressure noted to be elevated, propofol initiated and these rhythmic movements ceased, patient then began following commands and nodding and shaking his head to questions.  Sedation increased to  allow patient to rest, given this development I am concerned for possible seizure, Neurology has been consulted.    Patient's blood pressure has improved, no further seizure activity noted, Neurology has been to bedside to evaluate patient, patient accepted by Internal Medicine, patient's repeat blood gas is improving markedly with decreased pCO2 and increased pH.  Patient CT of the brain is pending, patient has normal troponin, patient's procalcitonin is negative, given that I have low suspicion for sepsis at this time, I will continue to limit fluid hydration given concern for fluid overload and the fact that respiratory therapist is having to repeatedly suction patient's airway and fluid is being obtained.  Patient's peripheral perfusion status appears to be adequate will continue to monitor patient closely.  Admit orders have been filed patient going to ICU.                        Clinical Impression:       ICD-10-CM ICD-9-CM   1. Respiratory arrest  R09.2 799.1   2. Chest pain  R07.9 786.50   3. Altered mental status, unspecified altered mental status type  R41.82 780.97             ED Disposition Condition    Admit                           Jong Bird MD  08/01/20 0898

## 2020-08-01 NOTE — CONSULTS
Critical access hospital  Neurology  Consult Note    Patient Name: Kal Dodd  MRN: [id number]  Admission Date: 8/1/2020  Hospital Length of Stay: 0 days  Code Status: Full Code   Attending Provider: Dr. Tyler  Consulting Provider: Dr. KALEN Phillips  Primary Care Physician: Ras Sharma MD  Principal Problem:Respiratory arrest      Subjective:     Chief Complaint:    Respiratory Arrest        Roommate told EMS that pt had c/o SOB and suddenly collapsed. EMS found pt unresponsive with agonal resps.  AMbu resps in progress on arrival with pulse 80         HPI per EMR: 60-year-old gentleman with past medical history of CAD status post stenting in 2012, chronic systolic CHF(30-35% EF), COPD, heavy tobacco use, opiate dependent, niddm, hyperlipidemia was brought by EMS due to respiratory arrest.  Upon my assessment patient is already intubated and on propofol.  History was obtained from ER physician.  As per my report from ED provider patient was complaining of shortness of breath at home and sudden collapse.  Friend called EMS and he was found to be unresponsive with agonal respiration.  Ambu bag was placed and was intubated upon arrival to ED due to severe hypoxia and hypercapnia.  According to ED physician patient has some generalized shakes that quickly resolved with propofol and immediately patient was following commands on propofol and still does on my assessment.  Patient did not lose pulse as per EMS.  No family member available at bedside.  Full review of system is not possible due to intubation and sedation.     Neurological Consult:   Patient seen examined in the ED and plan of care discussed with Dr. Kalen Phillips.  Patient was intubated at the time and could not speak but he could not his head yes and shake it know.  He was able to move all his extremities.  And followed simple commands.  EEG stat ordered along with CT head and 1 g of Keppra.  Past Medical History:   Diagnosis Date    Coronary artery disease  "4/26/12    "massive heart attack"    Heart attack     Hyperlipidemia     Hypertension        Past Surgical History:   Procedure Laterality Date    CORONARY STENT PLACEMENT  4/12       Review of patient's allergies indicates:  No Known Allergies    Current Neurological Medications:     No current facility-administered medications on file prior to encounter.      Current Outpatient Medications on File Prior to Encounter   Medication Sig    aspirin (ECOTRIN) 81 MG EC tablet Take 81 mg by mouth once daily.      buprenorphine-naloxone (SUBOXONE) 8-2 mg Film Place under the tongue once daily.    fenofibrate 160 MG Tab Take 1 tablet (160 mg total) by mouth once daily.    fluticasone propionate (FLONASE) 50 mcg/actuation nasal spray 1 spray (50 mcg total) by Each Nare route once daily.    furosemide (LASIX) 20 MG tablet Take 1 tablet (20 mg total) by mouth 2 (two) times daily.    hydrOXYzine pamoate (VISTARIL) 25 MG Cap Take 1 capsule (25 mg total) by mouth 4 (four) times daily.    lisinopril (PRINIVIL,ZESTRIL) 40 MG tablet Take 1 tablet (40 mg total) by mouth once daily. (Patient taking differently: Take 40 mg by mouth once daily. Pt taking 1/2 tab once daily)    lovastatin (MEVACOR) 20 MG tablet Take 1 tablet by mouth once daily    metoprolol succinate (TOPROL-XL) 25 MG 24 hr tablet Take 1 tablet (25 mg total) by mouth once daily.    nitroGLYCERIN (NITROSTAT) 0.4 MG SL tablet Place 1 tablet (0.4 mg total) under the tongue every 5 (five) minutes as needed for Chest pain.    nystatin (DUKE'S SOLUTION) Take 10 mLs by mouth 4 (four) times daily.    spironolactone (ALDACTONE) 25 MG tablet Take 1 tablet (25 mg total) by mouth once daily.    [DISCONTINUED] DULoxetine (CYMBALTA) 30 MG capsule Take 1 capsule (30 mg total) by mouth once daily.      Family History     Problem Relation (Age of Onset)    Diabetes Mother, Sister, Daughter    Heart disease Maternal Uncle    Hypertension Mother, Maternal Aunt    "     Tobacco Use    Smoking status: Current Every Day Smoker     Packs/day: 1.00    Smokeless tobacco: Never Used   Substance and Sexual Activity    Alcohol use: No    Drug use: No     Types: Marijuana    Sexual activity: Not Currently     Review of Systems   Unable to perform ROS: Intubated     Objective:     Vital Signs (Most Recent):  Temp: 97.7 °F (36.5 °C) (08/01/20 1101)  Pulse: 63 (08/01/20 1101)  Resp: 19 (08/01/20 1101)  BP: 101/60 (08/01/20 1101)  SpO2: 98 % (08/01/20 1101) Vital Signs (24h Range):  Temp:  [96.8 °F (36 °C)-98.5 °F (36.9 °C)] 97.7 °F (36.5 °C)  Pulse:  [58-94] 63  Resp:  [0-28] 19  SpO2:  [84 %-100 %] 98 %  BP: (101-234)/() 101/60     Weight: 83.3 kg (183 lb 10.3 oz)  Body mass index is 29.64 kg/m².    Physical Exam  Constitutional:       Appearance: He is ill-appearing.      Comments: arousable to voice   HENT:      Head: Normocephalic.   Eyes:      Extraocular Movements: Extraocular movements intact and EOM normal.      Pupils: Pupils are equal, round, and reactive to light.   Neck:      Musculoskeletal: Normal range of motion.   Cardiovascular:      Rate and Rhythm: Normal rate.   Pulmonary:      Breath sounds: No stridor. No wheezing.      Comments: intubated  Abdominal:      Palpations: Abdomen is soft.   Musculoskeletal:         General: No tenderness.      Right lower leg: No edema.      Left lower leg: No edema.      Comments: Limited ROM   Skin:     General: Skin is warm and dry.      Comments: Multiple bruises   Neurological:      Comments: MARY KAY   Psychiatric:         Behavior: Behavior is cooperative.         NEUROLOGICAL EXAMINATION:     MENTAL STATUS   Attention: normal. Concentration: normal.   Level of consciousness: arousable by verbal stimuli       Limited exam     CRANIAL NERVES     CN II   Visual fields full to confrontation.     CN III, IV, VI   Pupils are equal, round, and reactive to light.  Extraocular motions are normal.   Right pupil: Size: 2 mm.   Left  pupil: Size: 2 mm.     CN VIII   Hearing: intact    MOTOR EXAM        Limited exam 3-4/5 BL     SENSORY EXAM   Right leg light touch: decreased from toes  Left leg light touch: decreased from toes    GAIT AND COORDINATION     Tremor   Resting tremor: absent  Intention tremor: absent       limted exam       Significant Labs:   Lab Results   Component Value Date    WBC 11.77 08/01/2020    HGB 15.6 08/01/2020    HCT 49.0 08/01/2020    MCV 91 08/01/2020     08/01/2020       CMP  Sodium   Date Value Ref Range Status   08/01/2020 139 136 - 145 mmol/L Final     Potassium   Date Value Ref Range Status   08/01/2020 3.6 3.5 - 5.1 mmol/L Final     Chloride   Date Value Ref Range Status   08/01/2020 102 95 - 110 mmol/L Final     CO2   Date Value Ref Range Status   08/01/2020 23 23 - 29 mmol/L Final     Glucose   Date Value Ref Range Status   08/01/2020 230 (H) 70 - 110 mg/dL Final     BUN, Bld   Date Value Ref Range Status   08/01/2020 17 8 - 23 mg/dL Final     Creatinine   Date Value Ref Range Status   08/01/2020 1.1 0.5 - 1.4 mg/dL Final     Calcium   Date Value Ref Range Status   08/01/2020 8.6 (L) 8.7 - 10.5 mg/dL Final     Total Protein   Date Value Ref Range Status   08/01/2020 7.0 6.0 - 8.4 g/dL Final     Albumin   Date Value Ref Range Status   08/01/2020 4.0 3.5 - 5.2 g/dL Final     Total Bilirubin   Date Value Ref Range Status   08/01/2020 0.7 0.1 - 1.0 mg/dL Final     Comment:     For infants and newborns, interpretation of results should be based  on gestational age, weight and in agreement with clinical  observations.  Premature Infant recommended reference ranges:  Up to 24 hours.............<8.0 mg/dL  Up to 48 hours............<12.0 mg/dL  3-5 days..................<15.0 mg/dL  6-29 days.................<15.0 mg/dL       Alkaline Phosphatase   Date Value Ref Range Status   08/01/2020 60 55 - 135 U/L Final     AST   Date Value Ref Range Status   08/01/2020 100 (H) 10 - 40 U/L Final     ALT   Date Value Ref  Range Status   08/01/2020 68 (H) 10 - 44 U/L Final     Anion Gap   Date Value Ref Range Status   08/01/2020 14 8 - 16 mmol/L Final     eGFR if    Date Value Ref Range Status   08/01/2020 >60.0 >60 mL/min/1.73 m^2 Final     eGFR if non    Date Value Ref Range Status   08/01/2020 >60.0 >60 mL/min/1.73 m^2 Final     Comment:     Calculation used to obtain the estimated glomerular filtration  rate (eGFR) is the CKD-EPI equation.            Significant Imaging: CT Head Without Contrast  Narrative: EXAMINATION:  CT HEAD WITHOUT CONTRAST    CLINICAL HISTORY:  Altered mental status; respiratory arrest.    TECHNIQUE:  CMS MANDATED QUALITY DATA-CT RADIATION DOSE-436    All CT scans at this facility dose modulation, iterative reconstruction, and or weight-based dosing when appropriate to reduce radiation dose to as low as reasonably achievable.    FINDINGS:  No prior studies for comparison.  There is no acute intracranial hemorrhage, with no mass effect or abnormal extra-axial fluid.  Minimal scattered areas of nonspecific hypoattenuation involve the deep periventricular white matter, with gray-white differentiation maintained.    The cortical sulci and ventricles are normal in size for age.  The cerebellum and brainstem are unremarkable.  There is paranasal sinus mucosal thickening.  The mastoid air cells are clear. There is no acute osseous abnormality.  Impression: 1. Minimal scattered areas of nonspecific white matter hypoattenuation, suggesting chronic small vessel ischemic disease.  2. No acute intracranial hemorrhage, mass effect, or loss of gray-white differentiation.    Electronically signed by: Douglas Eubanks MD  Date:    08/01/2020  Time:    09:20  X-Ray Chest AP Portable  Narrative: EXAMINATION:  XR CHEST AP PORTABLE    CLINICAL HISTORY:  Coronary artery disease, previous myocardial infarction, respiratory arrest    FINDINGS:  Portable chest radiograph at 05:58 hours compared to  03/07/2017 shows ET tube at the level of T5, within 2 cm of the tawanda, with nasogastric tube with distal portion beyond the inferior margin of the radiograph, at least to the level of the gastroesophageal junction.  The cardiac silhouette is upper limits of normal in size, with the pulmonary vasculature prominent centrally.  There are changes of prior coronary endovascular stenting.    The lungs are symmetrically expanded, with diffuse reticulonodular densities throughout both lungs suggesting interstitial pulmonary edema.  There is no large pleural effusion or pneumothorax.  Impression: 1. Support devices in position as above.  2. Diffuse interstitial pulmonary edema.    Electronically signed by: Douglas Eubanks MD  Date:    08/01/2020  Time:    06:48        Assessment and Plan:  Pt with CHF, COPD, HTN, CAD, narcotic dependence:      Respiratory Arrest  -CHF/COPD exacerbation  -CXR: pulmonary edema  -Pt intubated  -IM/Pulmonary managing    Witnessed Seizure activity  -rhythmic jerking of head and arms  -EEG ordered  -CT head ordered  -!gram Keppra ONCE           [phone] within 3 days from discharge.     Side effects of seizure medications:   Explained to patient/caregiver that side effects of antiepileptics could include life threatening rashes, severe lab abnormalities, kidney stones, mood changes including depression, weight loss or gain, organ failure, suicidal ideation and death. The patient has been prescribed this medication because seizures have serious risks that in many cases outweight the risks. Advised patient/caregiver to be please be aware of these possible side effects and encouraged them to ask questions if needed.  Seizure precautions:     Patient and/caregiver advised that patients having seizures should not to drive or operate heavy machinery until 6 months seizure free. Also explained that patient is to avoid activities that could be high risk  during a seizure, including but not limited to swimming alone, climbing to high levels, and bathing in a tub.                                        Active Diagnoses:    Diagnosis Date Noted POA    PRINCIPAL PROBLEM:  Respiratory arrest [R09.2] 08/01/2020 Yes    Acute respiratory failure with hypoxia and hypercarbia [J96.01, J96.02] 08/01/2020 Yes    Acute exacerbation of systolic CHF(30-35% EF) [I50.21] 08/01/2020 Yes    Chronic narcotic dependence [F11.20] 08/01/2020 Yes    COPD with acute exacerbation [J44.1] 08/01/2020 Yes    Pulmonary edema with congestive heart failure with reduced left ventricular function [I50.1] 08/01/2020 Yes    Acute pulmonary edema [J81.0]  Unknown    Hyperlipidemia with target low density lipoprotein (LDL) cholesterol less than 100 mg/dL [E78.5] 02/13/2013 Yes    Hypertension [I10]  Yes    Coronary artery disease, shock, CHF, 5 ION stents in LAD, 4/2012 [I25.10] 04/26/2012 Yes      Problems Resolved During this Admission:       VTE Risk Mitigation (From admission, onward)         Ordered     enoxaparin injection 40 mg  Every 24 hours      08/01/20 0849     IP VTE HIGH RISK PATIENT  Once      08/01/20 0849     Place sequential compression device  Until discontinued      08/01/20 0849                Thank you for your consult. I will follow-up with patient. Please contact us if you have any additional questions.    Adelina Mcadams NP  Neurology  [facility]

## 2020-08-01 NOTE — HPI
60-year-old gentleman with past medical history of CAD status post stenting in 2012, chronic systolic CHF(30-35% EF), COPD, heavy tobacco use, opiate dependent, niddm, hyperlipidemia was brought by EMS due to respiratory arrest.  Upon my assessment patient is already intubated and on propofol.  History was obtained from ER physician.  As per my report from ED provider patient was complaining of shortness of breath at home and sudden collapse.  Friend called EMS and he was found to be unresponsive with agonal respiration.  Ambu bag was placed and was intubated upon arrival to ED due to severe hypoxia and hypercapnia.  According to ED physician patient has some generalized shakes that quickly resolved with propofol and immediately patient was following commands on propofol and still does on my assessment.  Patient did not lose pulse as per EMS.  No family member available at bedside.  Full review of system is not possible due to intubation and sedation.

## 2020-08-02 LAB
ALBUMIN SERPL BCP-MCNC: 3.9 G/DL (ref 3.5–5.2)
ALLENS TEST: ABNORMAL
ALLENS TEST: ABNORMAL
ALP SERPL-CCNC: 49 U/L (ref 55–135)
ALT SERPL W/O P-5'-P-CCNC: 52 U/L (ref 10–44)
ANION GAP SERPL CALC-SCNC: 14 MMOL/L (ref 8–16)
AST SERPL-CCNC: 30 U/L (ref 10–40)
BASOPHILS # BLD AUTO: 0.01 K/UL (ref 0–0.2)
BASOPHILS NFR BLD: 0.1 % (ref 0–1.9)
BILIRUB SERPL-MCNC: 0.6 MG/DL (ref 0.1–1)
BUN SERPL-MCNC: 23 MG/DL (ref 8–23)
CALCIUM SERPL-MCNC: 8.8 MG/DL (ref 8.7–10.5)
CHLORIDE SERPL-SCNC: 101 MMOL/L (ref 95–110)
CO2 SERPL-SCNC: 24 MMOL/L (ref 23–29)
CREAT SERPL-MCNC: 0.9 MG/DL (ref 0.5–1.4)
DELSYS: ABNORMAL
DELSYS: ABNORMAL
DIFFERENTIAL METHOD: ABNORMAL
EOSINOPHIL # BLD AUTO: 0 K/UL (ref 0–0.5)
EOSINOPHIL NFR BLD: 0 % (ref 0–8)
ERYTHROCYTE [DISTWIDTH] IN BLOOD BY AUTOMATED COUNT: 13.2 % (ref 11.5–14.5)
ERYTHROCYTE [SEDIMENTATION RATE] IN BLOOD BY WESTERGREN METHOD: 22 MM/H
EST. GFR  (AFRICAN AMERICAN): >60 ML/MIN/1.73 M^2
EST. GFR  (NON AFRICAN AMERICAN): >60 ML/MIN/1.73 M^2
FIO2: 40
FIO2: 80
FLOW: 80
GLUCOSE SERPL-MCNC: 169 MG/DL (ref 70–110)
GLUCOSE SERPL-MCNC: 173 MG/DL (ref 70–110)
GLUCOSE SERPL-MCNC: 174 MG/DL (ref 70–110)
HCO3 UR-SCNC: 26.9 MMOL/L (ref 24–28)
HCO3 UR-SCNC: 27.5 MMOL/L (ref 24–28)
HCT VFR BLD AUTO: 48.1 % (ref 40–54)
HCT VFR BLD CALC: 48 %PCV (ref 36–54)
HCT VFR BLD CALC: 51 %PCV (ref 36–54)
HGB BLD-MCNC: 15.6 G/DL (ref 14–18)
IMM GRANULOCYTES # BLD AUTO: 0.07 K/UL (ref 0–0.04)
IMM GRANULOCYTES NFR BLD AUTO: 0.4 % (ref 0–0.5)
LYMPHOCYTES # BLD AUTO: 1.1 K/UL (ref 1–4.8)
LYMPHOCYTES NFR BLD: 6.4 % (ref 18–48)
MAGNESIUM SERPL-MCNC: 1.9 MG/DL (ref 1.6–2.6)
MCH RBC QN AUTO: 28.3 PG (ref 27–31)
MCHC RBC AUTO-ENTMCNC: 32.4 G/DL (ref 32–36)
MCV RBC AUTO: 87 FL (ref 82–98)
MODE: ABNORMAL
MODE: ABNORMAL
MONOCYTES # BLD AUTO: 0.3 K/UL (ref 0.3–1)
MONOCYTES NFR BLD: 1.6 % (ref 4–15)
NEUTROPHILS # BLD AUTO: 15.9 K/UL (ref 1.8–7.7)
NEUTROPHILS NFR BLD: 91.5 % (ref 38–73)
NRBC BLD-RTO: 0 /100 WBC
PCO2 BLDA: 41.9 MMHG (ref 35–45)
PCO2 BLDA: 44.7 MMHG (ref 35–45)
PEEP: 5
PH SMN: 7.39 [PH] (ref 7.35–7.45)
PH SMN: 7.42 [PH] (ref 7.35–7.45)
PHOSPHATE SERPL-MCNC: 3.4 MG/DL (ref 2.7–4.5)
PLATELET # BLD AUTO: 279 K/UL (ref 150–350)
PMV BLD AUTO: 9.6 FL (ref 9.2–12.9)
PO2 BLDA: 63 MMHG (ref 80–100)
PO2 BLDA: 90 MMHG (ref 80–100)
POC BE: 2 MMOL/L
POC BE: 3 MMOL/L
POC IONIZED CALCIUM: 1.19 MMOL/L (ref 1.06–1.42)
POC IONIZED CALCIUM: 1.22 MMOL/L (ref 1.06–1.42)
POC SATURATED O2: 92 % (ref 95–100)
POC SATURATED O2: 97 % (ref 95–100)
POC TCO2: 28 MMOL/L (ref 23–27)
POC TCO2: 29 MMOL/L (ref 23–27)
POTASSIUM BLD-SCNC: 3.7 MMOL/L (ref 3.5–5.1)
POTASSIUM BLD-SCNC: 3.8 MMOL/L (ref 3.5–5.1)
POTASSIUM SERPL-SCNC: 3.8 MMOL/L (ref 3.5–5.1)
PROT SERPL-MCNC: 7.1 G/DL (ref 6–8.4)
PS: 10
RBC # BLD AUTO: 5.51 M/UL (ref 4.6–6.2)
SAMPLE: ABNORMAL
SAMPLE: ABNORMAL
SITE: ABNORMAL
SITE: ABNORMAL
SODIUM BLD-SCNC: 139 MMOL/L (ref 136–145)
SODIUM BLD-SCNC: 141 MMOL/L (ref 136–145)
SODIUM SERPL-SCNC: 139 MMOL/L (ref 136–145)
SP02: 93
SP02: 96
WBC # BLD AUTO: 17.34 K/UL (ref 3.9–12.7)

## 2020-08-02 PROCEDURE — 80053 COMPREHEN METABOLIC PANEL: CPT

## 2020-08-02 PROCEDURE — 36600 WITHDRAWAL OF ARTERIAL BLOOD: CPT

## 2020-08-02 PROCEDURE — 82330 ASSAY OF CALCIUM: CPT

## 2020-08-02 PROCEDURE — 99233 PR SUBSEQUENT HOSPITAL CARE,LEVL III: ICD-10-PCS | Mod: ,,, | Performed by: INTERNAL MEDICINE

## 2020-08-02 PROCEDURE — 36415 COLL VENOUS BLD VENIPUNCTURE: CPT

## 2020-08-02 PROCEDURE — 82803 BLOOD GASES ANY COMBINATION: CPT

## 2020-08-02 PROCEDURE — 99900035 HC TECH TIME PER 15 MIN (STAT)

## 2020-08-02 PROCEDURE — 84295 ASSAY OF SERUM SODIUM: CPT

## 2020-08-02 PROCEDURE — 63600175 PHARM REV CODE 636 W HCPCS

## 2020-08-02 PROCEDURE — 84132 ASSAY OF SERUM POTASSIUM: CPT

## 2020-08-02 PROCEDURE — 85014 HEMATOCRIT: CPT

## 2020-08-02 PROCEDURE — 83735 ASSAY OF MAGNESIUM: CPT

## 2020-08-02 PROCEDURE — 99900031 HC PATIENT EDUCATION (STAT)

## 2020-08-02 PROCEDURE — 25000003 PHARM REV CODE 250: Performed by: HOSPITALIST

## 2020-08-02 PROCEDURE — 94761 N-INVAS EAR/PLS OXIMETRY MLT: CPT

## 2020-08-02 PROCEDURE — 99900026 HC AIRWAY MAINTENANCE (STAT)

## 2020-08-02 PROCEDURE — 63600175 PHARM REV CODE 636 W HCPCS: Performed by: HOSPITALIST

## 2020-08-02 PROCEDURE — 20000000 HC ICU ROOM

## 2020-08-02 PROCEDURE — 99233 SBSQ HOSP IP/OBS HIGH 50: CPT | Mod: ,,, | Performed by: INTERNAL MEDICINE

## 2020-08-02 PROCEDURE — 25000003 PHARM REV CODE 250: Performed by: INTERNAL MEDICINE

## 2020-08-02 PROCEDURE — 85025 COMPLETE CBC W/AUTO DIFF WBC: CPT

## 2020-08-02 PROCEDURE — 84100 ASSAY OF PHOSPHORUS: CPT

## 2020-08-02 PROCEDURE — 94003 VENT MGMT INPAT SUBQ DAY: CPT

## 2020-08-02 PROCEDURE — 94640 AIRWAY INHALATION TREATMENT: CPT

## 2020-08-02 PROCEDURE — 25000242 PHARM REV CODE 250 ALT 637 W/ HCPCS: Performed by: INTERNAL MEDICINE

## 2020-08-02 PROCEDURE — 27000221 HC OXYGEN, UP TO 24 HOURS

## 2020-08-02 PROCEDURE — 63600175 PHARM REV CODE 636 W HCPCS: Performed by: INTERNAL MEDICINE

## 2020-08-02 PROCEDURE — 25000003 PHARM REV CODE 250

## 2020-08-02 RX ORDER — CLONIDINE 0.1 MG/24H
1 PATCH, EXTENDED RELEASE TRANSDERMAL
Status: DISCONTINUED | OUTPATIENT
Start: 2020-08-02 | End: 2020-08-03

## 2020-08-02 RX ORDER — ENOXAPARIN SODIUM 100 MG/ML
80 INJECTION SUBCUTANEOUS ONCE
Status: COMPLETED | OUTPATIENT
Start: 2020-08-02 | End: 2020-08-02

## 2020-08-02 RX ORDER — BUPRENORPHINE AND NALOXONE 8; 2 MG/1; MG/1
1 FILM, SOLUBLE BUCCAL; SUBLINGUAL DAILY
Status: DISCONTINUED | OUTPATIENT
Start: 2020-08-02 | End: 2020-08-05 | Stop reason: HOSPADM

## 2020-08-02 RX ORDER — IPRATROPIUM BROMIDE AND ALBUTEROL SULFATE 2.5; .5 MG/3ML; MG/3ML
3 SOLUTION RESPIRATORY (INHALATION) EVERY 4 HOURS
Status: DISCONTINUED | OUTPATIENT
Start: 2020-08-02 | End: 2020-08-03

## 2020-08-02 RX ADMIN — METHYLPREDNISOLONE SODIUM SUCCINATE 80 MG: 40 INJECTION, POWDER, FOR SOLUTION INTRAMUSCULAR; INTRAVENOUS at 11:08

## 2020-08-02 RX ADMIN — ENOXAPARIN SODIUM 80 MG: 80 INJECTION, SOLUTION INTRAVENOUS; SUBCUTANEOUS at 11:08

## 2020-08-02 RX ADMIN — CLONIDINE 1 PATCH: 0.1 PATCH TRANSDERMAL at 03:08

## 2020-08-02 RX ADMIN — CEFTRIAXONE 1 G: 1 INJECTION, SOLUTION INTRAVENOUS at 11:08

## 2020-08-02 RX ADMIN — SENNOSIDES AND DOCUSATE SODIUM 1 TABLET: 8.6; 5 TABLET ORAL at 09:08

## 2020-08-02 RX ADMIN — IPRATROPIUM BROMIDE AND ALBUTEROL SULFATE 3 ML: .5; 3 SOLUTION RESPIRATORY (INHALATION) at 08:08

## 2020-08-02 RX ADMIN — ONDANSETRON 4 MG: 2 INJECTION INTRAMUSCULAR; INTRAVENOUS at 03:08

## 2020-08-02 RX ADMIN — METHYLPREDNISOLONE SODIUM SUCCINATE 80 MG: 40 INJECTION, POWDER, FOR SOLUTION INTRAMUSCULAR; INTRAVENOUS at 09:08

## 2020-08-02 RX ADMIN — PROPOFOL 40 MCG/KG/MIN: 10 INJECTION, EMULSION INTRAVENOUS at 01:08

## 2020-08-02 RX ADMIN — FUROSEMIDE 40 MG: 10 INJECTION, SOLUTION INTRAMUSCULAR; INTRAVENOUS at 09:08

## 2020-08-02 RX ADMIN — BUPRENORPHINE AND NALOXONE 1 EACH: 8; 2 FILM, SOLUBLE BUCCAL; SUBLINGUAL at 03:08

## 2020-08-02 RX ADMIN — IPRATROPIUM BROMIDE AND ALBUTEROL SULFATE 3 ML: .5; 3 SOLUTION RESPIRATORY (INHALATION) at 11:08

## 2020-08-02 RX ADMIN — PROPOFOL 20 MCG/KG/MIN: 10 INJECTION, EMULSION INTRAVENOUS at 07:08

## 2020-08-02 RX ADMIN — IPRATROPIUM BROMIDE AND ALBUTEROL SULFATE 3 ML: .5; 3 SOLUTION RESPIRATORY (INHALATION) at 04:08

## 2020-08-02 RX ADMIN — ENOXAPARIN SODIUM 40 MG: 100 INJECTION SUBCUTANEOUS at 05:08

## 2020-08-02 RX ADMIN — NITROGLYCERIN 5 MCG/MIN: 20 INJECTION INTRAVENOUS at 01:08

## 2020-08-02 NOTE — PROGRESS NOTES
[facility]  Neurology  Consult Note    Patient Name: Kal Dodd  MRN: [id number]  Admission Date: 8/1/2020  Hospital Length of Stay: 1 days  Code Status: Full Code   Attending Provider: Dr. Tyler  Consulting Provider: Dr. KALEN Phillips  Primary Care Physician: Ras Sharma MD  Principal Problem:Respiratory arrest      Subjective:     Chief Complaint:    Respiratory Arrest        Roommate told EMS that pt had c/o SOB and suddenly collapsed. EMS found pt unresponsive with agonal resps.  AMbu resps in progress on arrival with pulse 80         HPI per EMR: 60-year-old gentleman with past medical history of CAD status post stenting in 2012, chronic systolic CHF(30-35% EF), COPD, heavy tobacco use, opiate dependent, niddm, hyperlipidemia was brought by EMS due to respiratory arrest.  Upon my assessment patient is already intubated and on propofol.  History was obtained from ER physician.  As per my report from ED provider patient was complaining of shortness of breath at home and sudden collapse.  Friend called EMS and he was found to be unresponsive with agonal respiration.  Ambu bag was placed and was intubated upon arrival to ED due to severe hypoxia and hypercapnia.  According to ED physician patient has some generalized shakes that quickly resolved with propofol and immediately patient was following commands on propofol and still does on my assessment.  Patient did not lose pulse as per EMS.  No family member available at bedside.  Full review of system is not possible due to intubation and sedation.     Neurological Consult:   Patient seen examined in the ED and plan of care discussed with Dr. Kalen Phillips.  Patient was intubated at the time and could not speak but he could not his head yes and shake it know.  He was able to move all his extremities.  And followed simple commands.  EEG stat ordered along with CT head and 1 g of Keppra.    8/2: Pt seen and examined. Brother at bedside. POC discussed with  "Dr. Trenton Phillips. Pt arousable to vice. He can answer yes and no questions, and he also can write somewhat. He is intubated and in BL wrist restraints but communication is possible. Pt denied pain.  Follows simple commands. No seizures reported over night.  Past Medical History:   Diagnosis Date    Coronary artery disease 4/26/12    "massive heart attack"    Heart attack     Hyperlipidemia     Hypertension        Past Surgical History:   Procedure Laterality Date    CORONARY STENT PLACEMENT  4/12       Review of patient's allergies indicates:  No Known Allergies    Current Neurological Medications:     No current facility-administered medications on file prior to encounter.      Current Outpatient Medications on File Prior to Encounter   Medication Sig    aspirin (ECOTRIN) 81 MG EC tablet Take 81 mg by mouth once daily.      buprenorphine-naloxone (SUBOXONE) 8-2 mg Film Place under the tongue once daily.    fenofibrate 160 MG Tab Take 1 tablet (160 mg total) by mouth once daily.    fluticasone propionate (FLONASE) 50 mcg/actuation nasal spray 1 spray (50 mcg total) by Each Nare route once daily.    furosemide (LASIX) 20 MG tablet Take 1 tablet (20 mg total) by mouth 2 (two) times daily.    hydrOXYzine pamoate (VISTARIL) 25 MG Cap Take 1 capsule (25 mg total) by mouth 4 (four) times daily.    lisinopril (PRINIVIL,ZESTRIL) 40 MG tablet Take 1 tablet (40 mg total) by mouth once daily. (Patient taking differently: Take 40 mg by mouth once daily. Pt taking 1/2 tab once daily)    lovastatin (MEVACOR) 20 MG tablet Take 1 tablet by mouth once daily    metoprolol succinate (TOPROL-XL) 25 MG 24 hr tablet Take 1 tablet (25 mg total) by mouth once daily.    nitroGLYCERIN (NITROSTAT) 0.4 MG SL tablet Place 1 tablet (0.4 mg total) under the tongue every 5 (five) minutes as needed for Chest pain.    nystatin (DUKE'S SOLUTION) Take 10 mLs by mouth 4 (four) times daily.    spironolactone (ALDACTONE) 25 MG tablet Take 1 " tablet (25 mg total) by mouth once daily.    [DISCONTINUED] DULoxetine (CYMBALTA) 30 MG capsule Take 1 capsule (30 mg total) by mouth once daily.      Family History     Problem Relation (Age of Onset)    Diabetes Mother, Sister, Daughter    Heart disease Maternal Uncle    Hypertension Mother, Maternal Aunt        Tobacco Use    Smoking status: Current Every Day Smoker     Packs/day: 1.00    Smokeless tobacco: Never Used   Substance and Sexual Activity    Alcohol use: No    Drug use: No     Types: Marijuana    Sexual activity: Not Currently     Review of Systems   Unable to perform ROS: Intubated     Objective:     Vital Signs (Most Recent):  Temp: 97.8 °F (36.6 °C) (08/02/20 1101)  Pulse: 90 (08/02/20 1359)  Resp: (!) 32 (08/02/20 1359)  BP: (!) 185/88 (08/02/20 1101)  SpO2: 97 % (08/02/20 1359) Vital Signs (24h Range):  Temp:  [97.6 °F (36.4 °C)-98 °F (36.7 °C)] 97.8 °F (36.6 °C)  Pulse:  [51-90] 90  Resp:  [18-32] 32  SpO2:  [92 %-100 %] 97 %  BP: ()/(52-88) 185/88     Weight: 83.3 kg (183 lb 10.3 oz)  Body mass index is 29.64 kg/m².    Physical Exam  Constitutional:       Appearance: He is ill-appearing.      Comments: arousable to voice   HENT:      Head: Normocephalic.   Eyes:      Extraocular Movements: Extraocular movements intact and EOM normal.      Pupils: Pupils are equal, round, and reactive to light.   Neck:      Musculoskeletal: Normal range of motion.   Cardiovascular:      Rate and Rhythm: Normal rate.   Pulmonary:      Breath sounds: No stridor. No wheezing.      Comments: intubated  Abdominal:      Palpations: Abdomen is soft.   Musculoskeletal:         General: No tenderness.      Right lower leg: No edema.      Left lower leg: No edema.      Comments: Limited ROM   Skin:     General: Skin is warm and dry.      Comments: Multiple bruises   Neurological:      Comments: MARY KAY   Psychiatric:         Behavior: Behavior is cooperative.         NEUROLOGICAL EXAMINATION:     MENTAL STATUS    Attention: normal. Concentration: normal.   Level of consciousness: arousable by verbal stimuli       Limited exam     CRANIAL NERVES     CN II   Visual fields full to confrontation.     CN III, IV, VI   Pupils are equal, round, and reactive to light.  Extraocular motions are normal.   Right pupil: Size: 2 mm.   Left pupil: Size: 2 mm.     CN VIII   Hearing: intact    MOTOR EXAM        Limited exam 3-4/5 BL     SENSORY EXAM   Right leg light touch: decreased from toes  Left leg light touch: decreased from toes    GAIT AND COORDINATION     Tremor   Resting tremor: absent  Intention tremor: absent       limted exam       Significant Labs:   Lab Results   Component Value Date    WBC 17.34 (H) 08/02/2020    HGB 15.6 08/02/2020    HCT 51 08/02/2020    MCV 87 08/02/2020     08/02/2020       CMP  Sodium   Date Value Ref Range Status   08/02/2020 139 136 - 145 mmol/L Final     Potassium   Date Value Ref Range Status   08/02/2020 3.8 3.5 - 5.1 mmol/L Final     Chloride   Date Value Ref Range Status   08/02/2020 101 95 - 110 mmol/L Final     CO2   Date Value Ref Range Status   08/02/2020 24 23 - 29 mmol/L Final     Glucose   Date Value Ref Range Status   08/02/2020 173 (H) 70 - 110 mg/dL Final     BUN, Bld   Date Value Ref Range Status   08/02/2020 23 8 - 23 mg/dL Final     Creatinine   Date Value Ref Range Status   08/02/2020 0.9 0.5 - 1.4 mg/dL Final     Calcium   Date Value Ref Range Status   08/02/2020 8.8 8.7 - 10.5 mg/dL Final     Total Protein   Date Value Ref Range Status   08/02/2020 7.1 6.0 - 8.4 g/dL Final     Albumin   Date Value Ref Range Status   08/02/2020 3.9 3.5 - 5.2 g/dL Final     Total Bilirubin   Date Value Ref Range Status   08/02/2020 0.6 0.1 - 1.0 mg/dL Final     Comment:     For infants and newborns, interpretation of results should be based  on gestational age, weight and in agreement with clinical  observations.  Premature Infant recommended reference ranges:  Up to 24  hours.............<8.0 mg/dL  Up to 48 hours............<12.0 mg/dL  3-5 days..................<15.0 mg/dL  6-29 days.................<15.0 mg/dL       Alkaline Phosphatase   Date Value Ref Range Status   08/02/2020 49 (L) 55 - 135 U/L Final     AST   Date Value Ref Range Status   08/02/2020 30 10 - 40 U/L Final     ALT   Date Value Ref Range Status   08/02/2020 52 (H) 10 - 44 U/L Final     Anion Gap   Date Value Ref Range Status   08/02/2020 14 8 - 16 mmol/L Final     eGFR if    Date Value Ref Range Status   08/02/2020 >60.0 >60 mL/min/1.73 m^2 Final     eGFR if non    Date Value Ref Range Status   08/02/2020 >60.0 >60 mL/min/1.73 m^2 Final     Comment:     Calculation used to obtain the estimated glomerular filtration  rate (eGFR) is the CKD-EPI equation.            Significant Imaging: X-Ray Chest AP Portable  CLINICAL HISTORY:  60 years (1960) Male chf resp arrest    TECHNIQUE:  Portable AP radiograph the chest.    COMPARISON:  Most recent radiograph from August 1, 2020.    FINDINGS:  The lungs are clear, except for some vascular crowding bilaterally,  likely related to a suboptimal inspiration.  Costophrenic angles are  seen without effusion. No pneumothorax is identified. The heart is  mildly enlarged. The mediastinum is within normal limits. Osseous  structures appear within normal limits. The visualized upper abdomen  is unremarkable.    Lines and tubes: Endotracheal tube with tip approximately 3 cm from  the tawanda. There is an enteric tube with tip at the GE junction.    IMPRESSION:  Mildly improved central hilar predominant interstitial opacities  suggesting improving pulmonary edema.    .    Electronically Signed by RAHUL Wright on 8/2/2020 6:21 AM        Assessment and Plan:  Pt with CHF, COPD, HTN, CAD, narcotic dependence:      Respiratory Arrest  -CHF/COPD exacerbation  -CXR: pulmonary edema  -Pt intubated  -IM/Pulmonary managing    Witnessed Seizure  activity  -rhythmic jerking of head and arms  -EEG ordered  -CT head: details above: np acute abnormality  -!gram Keppra ONCE      8/2: EEG results pending. No recent seizure activity reported.        Patient to follow up with [facility] at [phone] within 3 days from discharge.     Side effects of seizure medications:   Explained to patient/caregiver that side effects of antiepileptics could include life threatening rashes, severe lab abnormalities, kidney stones, mood changes including depression, weight loss or gain, organ failure, suicidal ideation and death. The patient has been prescribed this medication because seizures have serious risks that in many cases outweight the risks. Advised patient/caregiver to be please be aware of these possible side effects and encouraged them to ask questions if needed.  Seizure precautions:     Patient and/caregiver advised that patients having seizures should not to drive or operate heavy machinery until 6 months seizure free. Also explained that patient is to avoid activities that could be high risk during a seizure, including but not limited to swimming alone, climbing to high levels, and bathing in a tub.                                        Active Diagnoses:    Diagnosis Date Noted POA    PRINCIPAL PROBLEM:  Respiratory arrest [R09.2] 08/01/2020 Yes    Acute respiratory failure with hypoxia and hypercarbia [J96.01, J96.02] 08/01/2020 Yes    Acute exacerbation of systolic CHF(30-35% EF) [I50.21] 08/01/2020 Yes    Chronic narcotic dependence [F11.20] 08/01/2020 Yes    COPD with acute exacerbation [J44.1] 08/01/2020 Yes    Pulmonary edema with congestive heart failure with reduced left ventricular function [I50.1] 08/01/2020 Yes    Acute pulmonary edema [J81.0]  Unknown    Hyperlipidemia with target low density lipoprotein (LDL) cholesterol less than 100 mg/dL [E78.5] 02/13/2013 Yes    Hypertension [I10]  Yes    Coronary artery disease, shock,  CHF, 5 ION stents in LAD, 4/2012 [I25.10] 04/26/2012 Yes      Problems Resolved During this Admission:       VTE Risk Mitigation (From admission, onward)         Ordered     enoxaparin injection 40 mg  Every 24 hours      08/01/20 0849     IP VTE HIGH RISK PATIENT  Once      08/01/20 0849     Place sequential compression device  Until discontinued      08/01/20 0849                Thank you for your consult. I will follow-up with patient. Please contact us if you have any additional questions.    Adelina Mcadams NP  Neurology  [facility]

## 2020-08-02 NOTE — RESPIRATORY THERAPY
This note also relates to the following rows which could not be included:  Oxygen Concentration (%) - Cannot attach notes to unvalidated device data  SpO2 - Cannot attach notes to unvalidated device data  Pulse - Cannot attach notes to unvalidated device data  Ventilation Type - Cannot attach notes to unvalidated device data  Vent Mode - Cannot attach notes to unvalidated device data  Set Rate - Cannot attach notes to unvalidated device data  Vt Set - Cannot attach notes to unvalidated device data  PEEP/CPAP - Cannot attach notes to unvalidated device data  Pressure Support - Cannot attach notes to unvalidated device data  Waveform - Cannot attach notes to unvalidated device data  Peak Flow - Cannot attach notes to unvalidated device data  Plateau Set/Insp. Hold (sec) - Cannot attach notes to unvalidated device data  Trigger Sensitivity Flow/I-Trigger - Cannot attach notes to unvalidated device data  Resp Rate Total - Cannot attach notes to unvalidated device data  Peak Airway Pressure - Cannot attach notes to unvalidated device data  Mean Airway Pressure - Cannot attach notes to unvalidated device data  Plateau Pressure - Cannot attach notes to unvalidated device data  Exhaled Vt - Cannot attach notes to unvalidated device data  Total Ve - Cannot attach notes to unvalidated device data  I:E Ratio Measured - Cannot attach notes to unvalidated device data  Resp Rate High Alarm - Cannot attach notes to unvalidated device data  Press High Alarm - Cannot attach notes to unvalidated device data  Apnea Rate - Cannot attach notes to unvalidated device data  Apnea Volume (mL) - Cannot attach notes to unvalidated device data  Apnea Oxygen Concentration  - Cannot attach notes to unvalidated device data  Apnea Flow Rate (L/min) - Cannot attach notes to unvalidated device data  T Apnea - Cannot attach notes to unvalidated device data       08/02/20 0038   Patient Assessment/Suction   Level of Consciousness (AVPU)   (sleeping)    Respiratory Effort Unlabored   Expansion/Accessory Muscles/Retractions expansion symmetric;no retractions;no use of accessory muscles   All Lung Fields Breath Sounds clear   PRE-TX-O2   O2 Device (Oxygen Therapy) ventilator   Pulse Oximetry Type Continuous   $ Pulse Oximetry - Multiple Charge Pulse Oximetry - Multiple   Resp (!) 21   Aerosol Therapy   $ Aerosol Therapy Charges PRN treatment not required   Respiratory Interventions   Airway/Ventilation Management airway patency maintained   VAP Prevention Bundle HOB elevation maintained   Vent Select   Conventional Vent Y   Charged w/in last 24h YES   Preset Conventional Ventilator Settings   Vent ID [id number]   Vent Type    Respiratory Evaluation   $ Care Plan Tech Time 15 min   Evaluation For   (care plan)        08/02/20 0038   Patient Assessment/Suction   Level of Consciousness (AVPU)   (sleeping)   Respiratory Effort Unlabored   Expansion/Accessory Muscles/Retractions expansion symmetric;no retractions;no use of accessory muscles   All Lung Fields Breath Sounds clear   PRE-TX-O2   O2 Device (Oxygen Therapy) ventilator   Pulse Oximetry Type Continuous   $ Pulse Oximetry - Multiple Charge Pulse Oximetry - Multiple   Resp (!) 21   Aerosol Therapy   $ Aerosol Therapy Charges PRN treatment not required   Respiratory Interventions   Airway/Ventilation Management airway patency maintained   VAP Prevention Bundle HOB elevation maintained   Vent Select   Conventional Vent Y   Charged w/in last 24h YES   Preset Conventional Ventilator Settings   Vent ID [id number]   Vent Type    Respiratory Evaluation   $ Care Plan Tech Time 15 min   Evaluation For   (care plan)        08/02/20 0038   Patient Assessment/Suction   Level of Consciousness (AVPU)   (sleeping)   Respiratory Effort Unlabored   Expansion/Accessory Muscles/Retractions expansion symmetric;no retractions;no use of accessory muscles   All Lung Fields Breath Sounds clear   PRE-TX-O2   O2 Device (Oxygen Therapy)  ventilator   Pulse Oximetry Type Continuous   $ Pulse Oximetry - Multiple Charge Pulse Oximetry - Multiple   Resp (!) 21   Aerosol Therapy   $ Aerosol Therapy Charges PRN treatment not required   Respiratory Interventions   Airway/Ventilation Management airway patency maintained   VAP Prevention Bundle HOB elevation maintained   Vent Select   Conventional Vent Y   Charged w/in last 24h YES   Preset Conventional Ventilator Settings   Vent ID [id number]   Vent Type    Respiratory Evaluation   $ Care Plan Tech Time 15 min   Evaluation For   (care plan)

## 2020-08-02 NOTE — SIGNIFICANT EVENT
This note also relates to the following rows which could not be included:  Oxygen Concentration (%) - Cannot attach notes to unvalidated device data  SpO2 - Cannot attach notes to unvalidated device data  Pulse - Cannot attach notes to unvalidated device data  Resp - Cannot attach notes to unvalidated device data  Ventilation Type - Cannot attach notes to unvalidated device data  Vent Mode - Cannot attach notes to unvalidated device data  Set Rate - Cannot attach notes to unvalidated device data  Vt Set - Cannot attach notes to unvalidated device data  PEEP/CPAP - Cannot attach notes to unvalidated device data  Pressure Support - Cannot attach notes to unvalidated device data  Waveform - Cannot attach notes to unvalidated device data  Peak Flow - Cannot attach notes to unvalidated device data  Plateau Set/Insp. Hold (sec) - Cannot attach notes to unvalidated device data  Trigger Sensitivity Flow/I-Trigger - Cannot attach notes to unvalidated device data  Resp Rate Total - Cannot attach notes to unvalidated device data  Peak Airway Pressure - Cannot attach notes to unvalidated device data  Mean Airway Pressure - Cannot attach notes to unvalidated device data  Plateau Pressure - Cannot attach notes to unvalidated device data  Exhaled Vt - Cannot attach notes to unvalidated device data  Total Ve - Cannot attach notes to unvalidated device data  I:E Ratio Measured - Cannot attach notes to unvalidated device data  Resp Rate High Alarm - Cannot attach notes to unvalidated device data  Press High Alarm - Cannot attach notes to unvalidated device data  Apnea Rate - Cannot attach notes to unvalidated device data  Apnea Volume (mL) - Cannot attach notes to unvalidated device data  Apnea Oxygen Concentration  - Cannot attach notes to unvalidated device data  Apnea Flow Rate (L/min) - Cannot attach notes to unvalidated device data  T Apnea - Cannot attach notes to unvalidated device data       08/02/20 0213   Patient  Assessment/Suction   Level of Consciousness (AVPU) alert   Respiratory Effort Unlabored   Expansion/Accessory Muscles/Retractions expansion symmetric;no retractions;no use of accessory muscles   All Lung Fields Breath Sounds clear   Rhythm/Pattern, Respiratory assisted mechanically   Suction Method oral;tracheal   $ Suction Charges Inline Suction Procedure Stat Charge   Secretions Amount scant   PRE-TX-O2   O2 Device (Oxygen Therapy) ventilator   Pulse Oximetry Type Continuous        Airway - Non-Surgical 08/01/20 0531 Endotracheal Tube   Placement Date/Time: 08/01/20 0531   Present Prior to Hospital Arrival?: No  Method of Intubation: Glidescope  Inserted by:   Airway Device: Endotracheal Tube  Intubated: Postinduction  Airway Device Size: 7.5  Style: Cuffed  Cuff Inflation: Min...   Subglottic Suctioning Other (Comment)  (with oral suction)   Vent Select   Conventional Vent Y   Charged w/in last 24h YES   Preset Conventional Ventilator Settings   Vent ID [id number]   Vent Type    Humidity HME   Education   $ Education Other (see comment);15 min  (oral care, vent weaning)   Respiratory Evaluation   $ Care Plan Tech Time 15 min   Evaluation For   (care plan)        08/02/20 0213   Patient Assessment/Suction   Level of Consciousness (AVPU) alert   Respiratory Effort Unlabored   Expansion/Accessory Muscles/Retractions expansion symmetric;no retractions;no use of accessory muscles   All Lung Fields Breath Sounds clear   Rhythm/Pattern, Respiratory assisted mechanically   Suction Method oral;tracheal   $ Suction Charges Inline Suction Procedure Stat Charge   Secretions Amount scant   PRE-TX-O2   O2 Device (Oxygen Therapy) ventilator   Pulse Oximetry Type Continuous        Airway - Non-Surgical 08/01/20 0531 Endotracheal Tube   Placement Date/Time: 08/01/20 0531   Present Prior to Hospital Arrival?: No  Method of Intubation: Glidescope  Inserted by:   Airway Device: Endotracheal Tube  Intubated: Postinduction   Airway Device Size: 7.5  Style: Cuffed  Cuff Inflation: Min...   Subglottic Suctioning Other (Comment)  (with oral suction)   Vent Select   Conventional Vent Y   Charged w/in last 24h YES   Preset Conventional Ventilator Settings   Vent ID [id number]   Vent Type    Humidity HME   Education   $ Education Other (see comment);15 min  (oral care, vent weaning)   Respiratory Evaluation   $ Care Plan Tech Time 15 min   Evaluation For   (care plan)

## 2020-08-02 NOTE — RESPIRATORY THERAPY
This note also relates to the following rows which could not be included:  Oxygen Concentration (%) - Cannot attach notes to unvalidated device data  SpO2 - Cannot attach notes to unvalidated device data  Pulse - Cannot attach notes to unvalidated device data  Resp - Cannot attach notes to unvalidated device data  BP - Cannot attach notes to unvalidated device data  Ventilation Type - Cannot attach notes to unvalidated device data  Vent Mode - Cannot attach notes to unvalidated device data  Set Rate - Cannot attach notes to unvalidated device data  Vt Set - Cannot attach notes to unvalidated device data  PEEP/CPAP - Cannot attach notes to unvalidated device data  Pressure Support - Cannot attach notes to unvalidated device data  Waveform - Cannot attach notes to unvalidated device data  Peak Flow - Cannot attach notes to unvalidated device data  Plateau Set/Insp. Hold (sec) - Cannot attach notes to unvalidated device data  Trigger Sensitivity Flow/I-Trigger - Cannot attach notes to unvalidated device data  Resp Rate Total - Cannot attach notes to unvalidated device data  Peak Airway Pressure - Cannot attach notes to unvalidated device data  Mean Airway Pressure - Cannot attach notes to unvalidated device data  Plateau Pressure - Cannot attach notes to unvalidated device data  Exhaled Vt - Cannot attach notes to unvalidated device data  Total Ve - Cannot attach notes to unvalidated device data  I:E Ratio Measured - Cannot attach notes to unvalidated device data  Resp Rate High Alarm - Cannot attach notes to unvalidated device data  Press High Alarm - Cannot attach notes to unvalidated device data  Apnea Rate - Cannot attach notes to unvalidated device data  Apnea Volume (mL) - Cannot attach notes to unvalidated device data  Apnea Oxygen Concentration  - Cannot attach notes to unvalidated device data  Apnea Flow Rate (L/min) - Cannot attach notes to unvalidated device data  T Apnea - Cannot attach notes to unvalidated  device data       08/01/20 1932   Patient Assessment/Suction   Level of Consciousness (AVPU) alert   Respiratory Effort Unlabored   Expansion/Accessory Muscles/Retractions expansion symmetric;no retractions;no use of accessory muscles   Rhythm/Pattern, Respiratory assisted mechanically   Suction Method oral   PRE-TX-O2   O2 Device (Oxygen Therapy) ventilator   Pulse Oximetry Type Continuous   $ Pulse Oximetry - Multiple Charge Pulse Oximetry - Multiple        Airway - Non-Surgical 08/01/20 0531 Endotracheal Tube   Placement Date/Time: 08/01/20 0531   Present Prior to Hospital Arrival?: No  Method of Intubation: Glidescope  Inserted by: MD (c)  Airway Device: Endotracheal Tube  Intubated: Postinduction  Airway Device Size: 7.5  Style: Cuffed  Cuff Inflation: Min...   Secured at 22 cm   Measured At Lips   Secured Location Center   Secured by Commercial tube alvarez   Bite Block none   Site Condition Dry   Status Intact;Secured;Patent   Site Assessment Clean;Dry   Cuff Volume   (mlt)   Subglottic Suctioning   (with oral suction)   Respiratory Interventions   Airway/Ventilation Management airway patency maintained   VAP Prevention Bundle HOB elevation maintained   Vent Select   Conventional Vent Y   Charged w/in last 24h YES   Preset Conventional Ventilator Settings   Vent ID [id number]   Vent Type    Humidity HME   Education   $ Education Suction;Ventilator Oxygen;Other (see comment);15 min  (ABG,oral care)   Labs   $ Was an ABG obtained? Arterial Puncture;ISTAT - Blood gas   $ Labs Tech Time 15 min   Respiratory Evaluation   $ Care Plan Tech Time 15 min   Evaluation For   (care)   Increased Fio2 to 60% after ABG.

## 2020-08-02 NOTE — PROGRESS NOTES
Progress Note  PULMONARY    Admit Date: 8/1/2020 08/02/2020    From Dr García's consult 8/1 :History of Present Illness:     Pt had been seeing Dr Sharma - was on suboxone.   indicates gets suboxone still from Dr Naranjo got 74 of 8-2 suboxone on July 28th.  covid neg.  Called brother Eyal, no answer.             Per Dr Bird's history in er earlier today:       Respiratory Arrest         Roommate told EMS that pt had c/o SOB and suddenly collapsed. EMS found pt unresponsive with agonal resps.  AMbu resps in progress on arrival with pulse 80      60-year-old male past medical history of CAD, mi, hyperlipidemia hypertension brought in by EMS secondary to being found unresponsive.  According to her roommate patient was talking and developed a acute event where he passed out and became unresponsive.  EMS was called and upon arrival patient did have a pulse but unresponsive and therefore in respiratory distress.  Family denies any recent illness and patient is otherwise stable and has no other complaints.  Plan:   covid neg, procal low, bnp 186, severe hypercapnea, no resp meds prior admit?   cxr pulm edema- ef in 2017 low 30-35%. Severe copd suggested at bedside.      Copd rx, cor rx/eval.  Neuro eval.    SUBJECTIVE:     8/2 arouses, writes, no c/o      PFSH and Allergies reviewed.    OBJECTIVE:     Vitals (Most recent):  Vitals:    08/02/20 0945   BP:    Pulse: (!) 57   Resp: (!) 24   Temp:        Vitals (24 hour range):  Temp:  [97.6 °F (36.4 °C)-98 °F (36.7 °C)]   Pulse:  [51-66]   Resp:  [17-28]   BP: ()/(52-86)   SpO2:  [92 %-100 %]       Intake/Output Summary (Last 24 hours) at 8/2/2020 1010  Last data filed at 8/2/2020 0500  Gross per 24 hour   Intake 251.43 ml   Output 2250 ml   Net -1998.57 ml          Physical Exam:  The patient's neuro status (alertness,orientation,cognitive function,motor skills,), pharyngeal exam (oral lesions, hygiene, abn dentition,), Neck (jvd,mass,thyroid,nodes in neck and  above/below clavicle),RESPIRATORY(symmetry,effort,fremitus,percussion,auscultation),  Cor(rhythm,heart tones including gallops,perfusion,edema)ABD(distention,hepatic&splenomegaly,tenderness,masses), Skin(rash,cyanosis),Psyc(affect,judgement,).  Exam negative except for these pertinent findings:    Alert on low dose propofol. Faint wheezes and less tight. No edema.    Radiographs reviewed: view by direct vision  cxr 8/2 shows clearing of edema suggested 8/1  No results found for this or any previous visit.]    Labs     Recent Labs   Lab 08/02/20  0336 08/02/20  0543   WBC 17.34*  --    HGB 15.6  --    HCT 48.1 48     --      Recent Labs   Lab 08/01/20  1600 08/01/20  1803 08/02/20  0336   NA  --   --  139   K  --   --  3.8   CL  --   --  101   CO2  --   --  24   BUN  --   --  23   CREATININE  --   --  0.9   GLU  --   --  173*   CALCIUM  --   --  8.8   MG  --   --  1.9   PHOS  --   --  3.4   AST  --   --  30   ALT  --   --  52*   ALKPHOS  --   --  49*   BILITOT  --   --  0.6   PROT  --   --  7.1   ALBUMIN  --   --  3.9   LACTATE 1.7  --   --    TROPONINI  --  0.351*  --      Recent Labs   Lab 08/02/20  0543   PH 7.387   PCO2 44.7   PO2 90   HCO3 26.9     Microbiology Results (last 7 days)     Procedure Component Value Units Date/Time    Culture, Respiratory with Gram Stain [[id number]]  (Abnormal) Collected: 08/01/20 0631    Order Status: Completed Specimen: Respiratory from Endotracheal Aspirate Updated: 08/02/20 0843     Respiratory Culture Normal respiratory roshan also present      GRAM NEGATIVE ABDIAZIZ, LACTOSE   Moderate  Identification and susceptibility pending       Gram Stain (Respiratory) <10 epithelial cells per low power field.     Gram Stain (Respiratory) Few WBC's     Gram Stain (Respiratory) Few Gram positive cocci    Culture, Respiratory with Gram Stain [[id number]] Collected: 08/01/20 0932    Order Status: Completed Specimen: Respiratory from Sputum Updated: 08/02/20 0842     Respiratory  "Culture Normal respiratory roshan     Gram Stain (Respiratory) <10 epithelial cells per low power field.     Gram Stain (Respiratory) Few WBC's     Gram Stain (Respiratory) Rare Gram positive cocci    Blood Culture #1 **CANNOT BE ORDERED STAT** [[id number]] Collected: 08/01/20 0615    Order Status: Completed Specimen: Blood from Peripheral, Forearm, Right Updated: 08/01/20 1917     Blood Culture, Routine No Growth to date    Blood Culture #2 **CANNOT BE ORDERED STAT** [[id number]] Collected: 08/01/20 0616    Order Status: Completed Specimen: Blood from Peripheral, Upper Arm, Right Updated: 08/01/20 1917     Blood Culture, Routine No Growth to date        8/1 echo   Conclusion    · Concentric left ventricular hypertrophy.  · Mild left ventricular enlargement.  · Mildly decreased left ventricular systolic function. The estimated ejection fraction is 40%.  · Grade I (mild) left ventricular diastolic dysfunction consistent with impaired relaxation.  · Normal right ventricular systolic function.  · Mild left atrial enlargement.  · Local segmental wall motion abnormalities.  · Normal central venous pressure (3 mmHg).          Impression:  Active Hospital Problems    Diagnosis  POA    *Respiratory arrest [R09.2]  Yes    Acute respiratory failure with hypoxia and hypercarbia [J96.01, J96.02]  Yes    Acute exacerbation of systolic CHF(30-35% EF) [I50.21]  Yes    Chronic narcotic dependence [F11.20]  Yes    COPD with acute exacerbation [J44.1]  Yes    Pulmonary edema with congestive heart failure with reduced left ventricular function [I50.1]  Yes    Acute pulmonary edema [J81.0]  Unknown    Hyperlipidemia with target low density lipoprotein (LDL) cholesterol less than 100 mg/dL [E78.5]  Yes    Hypertension [I10]  Yes    Coronary artery disease, shock, CHF, 5 ION stents in LAD, 4/2012 [I25.10]  Yes     "massive heart attack"        Resolved Hospital Problems   No resolved problems to display.               Plan:   8/2 " wbc 17.3, ph 7.39 with 02 90 on 80%  Afebrile,   cxr 8/2 shows clearing of edema suggested 8/1  Troponin min up 0.35  Sputum gnr- will dose rocephin- could have pseudomonas?     Pt was very wheezey/tight yesterday, brother at bedside - relates pt has had chr cough/wheezes and uses no meds and continued to smoke.    Check d dimer as needed 80%?  Now on 40% cpap trial doing well.  Work to extubate.  Picture not clear..                                    .

## 2020-08-02 NOTE — PROGRESS NOTES
"[facility]  Department of Cardiology  Consult Note      PATIENT NAME: Kal Dodd  MRN: [id number]  TODAY'S DATE: 08/02/2020  ADMIT DATE: 8/1/2020                          CONSULT REQUESTED BY: Mallika Tyler MD    SUBJECTIVE     PRINCIPAL PROBLEM: Respiratory arrest      REASON FOR CONSULT:  CHF    Interval update 08/02/2020:  Patient had non remained full night appears more alert and responsive remains intubated and feeling simple questions with head not and hand gestures.  Appears anxious to have his ET tube removed.      HPI:    -Chart Reviewed  -Patient is on ventilator, can answer yes and no questions however he is currently sedated, most history obtained from records.    Mr. Dodd is a 60 year old male patient with a PMH significant for history of HTN, CAD, Dyslipidemia, hepatitis, and CHF last seen on ECHO in 2017 showing evidence at that time of a prior transmural MI and LVEF 30-35%. He was brought to the hospital after having fallen to the ground saying he could not breathe and agonal breathing was noted. EMS activated by his friend and patient was ambu bagged the way to the hospital and intubated in the ED. PH on admission 7.1. PCO2 88. Apparently according to the nurse he had a 23 year old daughter die recently in the past year and was positive for marijuana, benzo's and take suboxone. Patient denies however attempt to hurt himself. He answers no when I ask if he had chest pain prior to falling to the ground. He shakes his head yes when I ask if he had been short of breath for the past several days. CXR shows some interstitial pulmonary edema. BNP only 186.         Review of patient's allergies indicates:  No Known Allergies    Past Medical History:   Diagnosis Date    Coronary artery disease 4/26/12    "massive heart attack"    Heart attack     Hyperlipidemia     Hypertension      Past Surgical History:   Procedure Laterality Date    CORONARY STENT PLACEMENT  4/12     Social " History     Tobacco Use    Smoking status: Current Every Day Smoker     Packs/day: 1.00    Smokeless tobacco: Never Used   Substance Use Topics    Alcohol use: No    Drug use: No     Types: Marijuana        REVIEW OF SYSTEMS  Unable to obtain full ROS  However patient does say yes to SOB over the past several days    OBJECTIVE     VITAL SIGNS (Most Recent)  Temp: 97.9 °F (36.6 °C) (08/02/20 0701)  Pulse: (!) 54 (08/02/20 0915)  Resp: (!) 24 (08/02/20 0915)  BP: (!) 184/86 (08/02/20 0900)  SpO2: 99 % (08/02/20 0915)    VENTILATION STATUS  Resp: (!) 24 (08/02/20 0915)  SpO2: 99 % (08/02/20 0915)  Vent Mode: Spont  Oxygen Concentration (%):  [] 60  Resp Rate Total:  [18 br/min-31 br/min] 25 br/min  Vt Set:  [450 mL-500 mL] 500 mL  PEEP/CPAP:  [5 cmH20] 5 cmH20  Pressure Support:  [0 cmH20-10 cmH20] 10 cmH20  Mean Airway Pressure:  [8.4 sdB60-19 cmH20] 9.2 cmH20    I & O (Last 24H):    Intake/Output Summary (Last 24 hours) at 8/2/2020 0924  Last data filed at 8/2/2020 0500  Gross per 24 hour   Intake 251.43 ml   Output 2950 ml   Net -2698.57 ml       WEIGHTS  Wt Readings from Last 1 Encounters:   08/01/20 0913 83.3 kg (183 lb 10.3 oz)   08/01/20 0526 86 kg (189 lb 9.5 oz)       PHYSICAL EXAM  GENERAL: well built, well nourished, well-developed in no apparent distress alert and oriented.    HEENT: Orally Intubated Poor oral dentition and partially edentulous,  NECK: No JVD. No bruit..   THYROID: Thyroid not enlarged. No nodules present..   CARDIAC: Regular rate and rhythm. S1 is normal.S2 is normal.  No gallops, clicks or murmurs noted at this time.  CHEST ANATOMY: normal.   LUNGS: scattered rhonchi, intubated  ABDOMEN: Soft no masses or organomegaly.  No abdomen pulsations or bruits.  Normal bowel sounds. No pulsations and no masses felt, No guarding or rebound.   URINARY:  Concentrated urine in the cuevas catheter   EXTREMITIES: No cyanosis, clubbing or edema noted at this time., no calf tenderness  bilaterally.   PERIPHERAL VASCULAR SYSTEM: Good palpable distal pulses.   CENTRAL NERVOUS SYSTEM:  Moving all extremities  SKIN: Skin without lesions, moist, well perfused.       HOME MEDICATIONS:  No current facility-administered medications on file prior to encounter.      Current Outpatient Medications on File Prior to Encounter   Medication Sig Dispense Refill    aspirin (ECOTRIN) 81 MG EC tablet Take 81 mg by mouth once daily.        buprenorphine-naloxone (SUBOXONE) 8-2 mg Film Place under the tongue once daily.      fenofibrate 160 MG Tab Take 1 tablet (160 mg total) by mouth once daily. 90 tablet 0    fluticasone propionate (FLONASE) 50 mcg/actuation nasal spray 1 spray (50 mcg total) by Each Nare route once daily. 1 Bottle 2    furosemide (LASIX) 20 MG tablet Take 1 tablet (20 mg total) by mouth 2 (two) times daily. 60 tablet 11    hydrOXYzine pamoate (VISTARIL) 25 MG Cap Take 1 capsule (25 mg total) by mouth 4 (four) times daily. 30 capsule 0    lisinopril (PRINIVIL,ZESTRIL) 40 MG tablet Take 1 tablet (40 mg total) by mouth once daily. (Patient taking differently: Take 40 mg by mouth once daily. Pt taking 1/2 tab once daily) 90 tablet 3    lovastatin (MEVACOR) 20 MG tablet Take 1 tablet by mouth once daily 90 tablet 0    metoprolol succinate (TOPROL-XL) 25 MG 24 hr tablet Take 1 tablet (25 mg total) by mouth once daily. 30 tablet 11    nitroGLYCERIN (NITROSTAT) 0.4 MG SL tablet Place 1 tablet (0.4 mg total) under the tongue every 5 (five) minutes as needed for Chest pain. 30 tablet 1    nystatin (DUKE'S SOLUTION) Take 10 mLs by mouth 4 (four) times daily. 120 mL 0    spironolactone (ALDACTONE) 25 MG tablet Take 1 tablet (25 mg total) by mouth once daily. 30 tablet 11    [DISCONTINUED] DULoxetine (CYMBALTA) 30 MG capsule Take 1 capsule (30 mg total) by mouth once daily. 90 capsule 1       SCHEDULED MEDS:   aspirin  81 mg Oral Daily    enoxaparin  40 mg Subcutaneous Q24H    fluticasone  propionate  1 spray Each Nostril Daily    furosemide (LASIX) IV  40 mg Intravenous Q12H    methylPREDNISolone sodium succinate  80 mg Intravenous Q12H    polyethylene glycol  17 g Oral Daily    senna-docusate 8.6-50 mg  1 tablet Oral BID       CONTINUOUS INFUSIONS:   nitroGLYCERIN Stopped (08/01/20 0708)    propofoL 20 mcg/kg/min (08/02/20 0710)       PRN MEDS:acetaminophen, albuterol-ipratropium, calcium chloride IVPB, calcium chloride IVPB, calcium chloride IVPB, dextrose 50%, dextrose 50%, glucagon (human recombinant), glucose, glucose, HYDROcodone-acetaminophen, insulin aspart U-100, magnesium oxide, magnesium sulfate IVPB, magnesium sulfate IVPB, magnesium sulfate IVPB, magnesium sulfate IVPB, melatonin, morphine, ondansetron, potassium chloride in water, potassium chloride in water, potassium chloride in water, potassium chloride in water, potassium chloride, potassium chloride, potassium chloride, potassium chloride, promethazine (PHENERGAN) IVPB, sodium chloride 0.9%, sodium phosphate IVPB, sodium phosphate IVPB, sodium phosphate IVPB, sodium phosphate IVPB, sodium phosphate IVPB    LABS AND DIAGNOSTICS     CBC LAST 3 DAYS  Recent Labs   Lab 08/01/20 0558 08/02/20 0336 08/02/20  0543   WBC 11.77 17.34*  --    RBC 5.40 5.51  --    HGB 15.6 15.6  --    HCT 49.0 48.1 48   MCV 91 87  --    MCH 28.9 28.3  --    MCHC 31.8* 32.4  --    RDW 13.2 13.2  --     279  --    MPV 9.0* 9.6  --    GRAN 31.0* 91.5*  15.9*  --    LYMPH 67.0* 6.4*  1.1  --    MONO 0.0* 1.6*  0.3  --    BASO  --  0.01  --    NRBC 0 0  --        COAGULATION LAST 3 DAYS  Recent Labs   Lab 08/01/20 0558   LABPT 13.0   INR 1.0       CHEMISTRY LAST 3 DAYS  Recent Labs   Lab 08/01/20 0558 08/01/20  0739 08/01/20  1935 08/02/20 0336 08/02/20  0543     --   --   --  139  --    K 3.6  --   --   --  3.8  --      --   --   --  101  --    CO2 23  --   --   --  24  --    ANIONGAP 14  --   --   --  14  --    BUN 17  --   --    --  23  --    CREATININE 1.1  --   --   --  0.9  --    *  --   --   --  173*  --    CALCIUM 8.6*  --   --   --  8.8  --    PH  --    < > 7.287* 7.386  --  7.387   MG 2.0  --   --   --  1.9  --    ALBUMIN 4.0  --   --   --  3.9  --    PROT 7.0  --   --   --  7.1  --    ALKPHOS 60  --   --   --  49*  --    ALT 68*  --   --   --  52*  --    *  --   --   --  30  --    BILITOT 0.7  --   --   --  0.6  --     < > = values in this interval not displayed.       CARDIAC PROFILE LAST 3 DAYS  Recent Labs   Lab 08/01/20  0558 08/01/20  1130 08/01/20  1803   *  --   --    TROPONINI 0.036 0.432* 0.351*       ENDOCRINE LAST 3 DAYS  Recent Labs   Lab 08/01/20  0616   PROCAL <0.05       LAST ARTERIAL BLOOD GAS  ABG  Recent Labs   Lab 08/02/20  0543   PH 7.387   PO2 90   PCO2 44.7   HCO3 26.9   BE 2       LAST 7 DAYS MICROBIOLOGY   Microbiology Results (last 7 days)     Procedure Component Value Units Date/Time    Culture, Respiratory with Gram Stain [[id number]]  (Abnormal) Collected: 08/01/20 0631    Order Status: Completed Specimen: Respiratory from Endotracheal Aspirate Updated: 08/02/20 0843     Respiratory Culture Normal respiratory roshan also present      GRAM NEGATIVE ABDIAZIZ, LACTOSE   Moderate  Identification and susceptibility pending       Gram Stain (Respiratory) <10 epithelial cells per low power field.     Gram Stain (Respiratory) Few WBC's     Gram Stain (Respiratory) Few Gram positive cocci    Culture, Respiratory with Gram Stain [[id number]] Collected: 08/01/20 0932    Order Status: Completed Specimen: Respiratory from Sputum Updated: 08/02/20 0842     Respiratory Culture Normal respiratory roshan     Gram Stain (Respiratory) <10 epithelial cells per low power field.     Gram Stain (Respiratory) Few WBC's     Gram Stain (Respiratory) Rare Gram positive cocci    Blood Culture #1 **CANNOT BE ORDERED STAT** [[id number]] Collected: 08/01/20 0615    Order Status: Completed Specimen: Blood from  Peripheral, Forearm, Right Updated: 08/01/20 1917     Blood Culture, Routine No Growth to date    Blood Culture #2 **CANNOT BE ORDERED STAT** [[id number]] Collected: 08/01/20 0616    Order Status: Completed Specimen: Blood from Peripheral, Upper Arm, Right Updated: 08/01/20 1917     Blood Culture, Routine No Growth to date          MOST RECENT IMAGING  X-Ray Chest AP Portable  CLINICAL HISTORY:  60 years (1960) Male chf resp arrest    TECHNIQUE:  Portable AP radiograph the chest.    COMPARISON:  Most recent radiograph from August 1, 2020.    FINDINGS:  The lungs are clear, except for some vascular crowding bilaterally,  likely related to a suboptimal inspiration.  Costophrenic angles are  seen without effusion. No pneumothorax is identified. The heart is  mildly enlarged. The mediastinum is within normal limits. Osseous  structures appear within normal limits. The visualized upper abdomen  is unremarkable.    Lines and tubes: Endotracheal tube with tip approximately 3 cm from  the tawanda. There is an enteric tube with tip at the GE junction.    IMPRESSION:  Mildly improved central hilar predominant interstitial opacities  suggesting improving pulmonary edema.    .    Electronically Signed by RAHUL Wright on 8/2/2020 6:21 AM      ECHOCARDIOGRAM RESULTS (last 5)  No results found for this or any previous visit.    CURRENT/PREVIOUS VISIT EKG  Results for orders placed or performed during the hospital encounter of 08/01/20   EKG 12-lead    Collection Time: 08/01/20  6:47 PM    Narrative    Test Reason : R06.02,    Vent. Rate : 068 BPM     Atrial Rate : 068 BPM     P-R Int : 154 ms          QRS Dur : 114 ms      QT Int : 444 ms       P-R-T Axes : 059 086 184 degrees     QTc Int : 472 ms    Normal sinus rhythm  Possible Inferior infarct ,age undetermined  Anteroseptal infarct (cited on or before 18-MAY-2017)  ST and T wave abnormality, consider lateral ischemia  Abnormal ECG  When compared with ECG of  "01-AUG-2020 06:29,  No significant change was found    Referred By: DORITA ERIC           Confirmed By:            ASSESSMENT/PLAN:     Active Hospital Problems    Diagnosis    *Respiratory arrest    Acute respiratory failure with hypoxia and hypercarbia    Acute exacerbation of systolic CHF(30-35% EF)    Chronic narcotic dependence    COPD with acute exacerbation    Pulmonary edema with congestive heart failure with reduced left ventricular function    Acute pulmonary edema    Hyperlipidemia with target low density lipoprotein (LDL) cholesterol less than 100 mg/dL    Hypertension    Coronary artery disease, shock, CHF, 5 ION stents in LAD, 4/2012     "massive heart attack"     Conclusion    · Concentric left ventricular hypertrophy.  · Mild left ventricular enlargement.  · Mildly decreased left ventricular systolic function. The estimated ejection fraction is 40%.  · Grade I (mild) left ventricular diastolic dysfunction consistent with impaired relaxation.  · Normal right ventricular systolic function.  · Mild left atrial enlargement.  · Local segmental wall motion abnormalities.  · Normal central venous pressure (3 mmHg).         ASSESSMENT & PLAN:     1. Acute Respiratory Failure with Hypoxia and Hypercapnia  2. Acute on Chronic CHF with reduced ejection fraction- HF-r-EF  3. CAD S/P MI in 2012  4. Transaminitis-currently stable  5. Vitamin D Deficiency  6. H/O LVEF echo shows some improvement in EF about 40%.    RECOMMENDATIONS:    Agree with the plans to proceed with extubation patient appears to be relatively stable.  Continue on enteric-coated aspirin,  Maintain on diuretic therapy with intravenously for next 24 hr  Resume his oral medications upon extubation including lisinopril can not changes to 20 mg p.o. b.i.d. as the blood pressure tolerates  Lasix and Aldactone orally as on admit  Resume metoprolol succinate 25 mg daily.  Maintain on low salt low cholesterol diet upon extubation.  Thank you " for the consultation will follow up with you    Lazaro Perez MD  [facility]  Department of Cardiology  Date of Service: 08/02/2020  9:38 AM    I have personally interviewed and examined the patient, informed t the patient's brother at bedside of the clinical condition.

## 2020-08-02 NOTE — PROGRESS NOTES
[facility] Medicine  Progress Note    DOS: 08/02/2020    Patient name: Kal Dodd  MRN: [id number]  Admit Date: 8/1/2020   LOS: 1 day     SUBJECTIVE:     Principal problem: Respiratory arrest    Interval History:  Patient was seen and examined bedside.  He is wide awake and follows commands while intubated.  Blood gases are promising.  Urine output is good with help of Lasix.  Denies any new symptoms.  Blood pressure is again running high and was put on nitroglycerin drip.  No other acute events overnight as per nursing staff.  Denies any new symptoms    Scheduled Meds:   aspirin  81 mg Oral Daily    enoxaparin  40 mg Subcutaneous Q24H    fluticasone propionate  1 spray Each Nostril Daily    furosemide (LASIX) IV  40 mg Intravenous Q12H    methylPREDNISolone sodium succinate  80 mg Intravenous Q12H    polyethylene glycol  17 g Oral Daily    senna-docusate 8.6-50 mg  1 tablet Oral BID     Continuous Infusions:   nitroGLYCERIN Stopped (08/01/20 0708)    propofoL 40 mcg/kg/min (08/02/20 0155)     PRN Meds:acetaminophen, albuterol-ipratropium, calcium chloride IVPB, calcium chloride IVPB, calcium chloride IVPB, dextrose 50%, dextrose 50%, glucagon (human recombinant), glucose, glucose, HYDROcodone-acetaminophen, insulin aspart U-100, magnesium oxide, magnesium sulfate IVPB, magnesium sulfate IVPB, magnesium sulfate IVPB, magnesium sulfate IVPB, melatonin, morphine, ondansetron, potassium chloride in water, potassium chloride in water, potassium chloride in water, potassium chloride in water, potassium chloride, potassium chloride, potassium chloride, potassium chloride, promethazine (PHENERGAN) IVPB, sodium chloride 0.9%, sodium phosphate IVPB, sodium phosphate IVPB, sodium phosphate IVPB, sodium phosphate IVPB, sodium phosphate IVPB    Review of patient's allergies indicates:  No Known Allergies    Review of Systems: As per interval history    OBJECTIVE:     Vital Signs (Most  Recent)  Temp: 97.8 °F (36.6 °C) (08/02/20 0400)  Pulse: 66 (08/02/20 0725)  Resp: (!) 24 (08/02/20 0725)  BP: (!) 93/52 (08/02/20 0400)  SpO2: 99 % (08/02/20 0725)    Vital Signs Range (Last 24H):  Temp:  [96.8 °F (36 °C)-98 °F (36.7 °C)]   Pulse:  [51-67]   Resp:  [17-28]   BP: ()/(52-84)   SpO2:  [92 %-100 %]     I & O (Last 24H):    Intake/Output Summary (Last 24 hours) at 8/2/2020 0825  Last data filed at 8/2/2020 0500  Gross per 24 hour   Intake 351.43 ml   Output 2950 ml   Net -2598.57 ml       Physical Exam:  General: Patient resting comfortably in no acute distress. Appears as stated age. Calm, INTUBATED HOWEVER FOLLOWS COMMANDS  Eyes: No conjunctival injection. No scleral icterus.  ENT: Hearing grossly intact. No discharge from ears. No nasal discharge.   Neck: Supple, trachea midline. No JVD  CVS: RRR. No LE edema BL  Lungs:  Basal crackles bilaterally, no wheezing or crackles. Good breath sounds. No accessory muscle use. No acute respiratory distress, INTUBATED  Abdomen:  Soft, nontender and nondistended.  No organomegaly  Neuro: AOx3. Moves all extremities. Follows commands while intubated  Skin:  No rash or erythema noted    Laboratory:  CBC:   Recent Labs   Lab 08/02/20  0336  08/02/20  1134   WBC 17.34*  --   --    RBC 5.51  --   --    HGB 15.6  --   --    HCT 48.1   < > 51     --   --    MCV 87  --   --    MCH 28.3  --   --    MCHC 32.4  --   --     < > = values in this interval not displayed.     BMP:   Recent Labs   Lab 08/02/20  0336   *      K 3.8      CO2 24   BUN 23   CREATININE 0.9   CALCIUM 8.8   MG 1.9       Diagnostic Results:  Reviewed imaging    ASSESSMENT/PLAN:     60-year-old gentleman with past medical history of systolic CHF(30-35% EF), CAD status post stenting in 2012, COPD, heavy tobacco dependence, opiate dependent came after encountering respiratory arrest at home was intubated in ED.    Active Hospital Problems    Diagnosis  POA    *Respiratory  "arrest [R09.2]  Yes     Priority: 1 - High    Acute respiratory failure with hypoxia and hypercarbia [J96.01, J96.02]  Yes     Priority: 2     Acute exacerbation of systolic CHF(30-35% EF) [I50.21]  Yes     Priority: 3     Pulmonary edema with congestive heart failure with reduced left ventricular function [I50.1]  Yes     Priority: 4     Coronary artery disease, shock, CHF, 5 ION stents in LAD, 4/2012 [I25.10]  Yes     Priority: 5      "massive heart attack"      COPD with acute exacerbation [J44.1]  Yes     Priority: 6     Chronic narcotic dependence [F11.20]  Yes    Acute pulmonary edema [J81.0]  Unknown    Hyperlipidemia with target low density lipoprotein (LDL) cholesterol less than 100 mg/dL [E78.5]  Yes    Hypertension [I10]  Yes      Resolved Hospital Problems   No resolved problems to display.         Plan:   Acute hypoxic and hypercapnic respiratory failure likely due to systolic CHF exacerbation and some component of COPD exacerbation. Lactic acid elevation is likely due to hypoxia.  Procalcitonin is negative.  COVID-19 rapid screen negative     Patient was intubated in ED. Consult pulmonary for vent management.  Patient is awake while intubated, ABGs are promising patient can be extubated if pulmonary approves     IV diuresis with aggressive electrolyte replacement, daily weight, accurate charting of urine output     Serial EKGs, telemetry monitoring, cardiology consult.      Bronchodilators, supplemental oxygen, low-dose steroid     Hold lisinopril and Aldactone in light of aggressive IV diuresis     Resume home medications     Basal bolus insulin regimen, Accuchecks     Neurology was consulted by ED physician due to some rhythmic tonic clonic movements however that could be metabolic due to hypoxia.  CT head did not show any acute pathology. Now on Keppra     Further management as per clinical course and consultant's recommendations     VTE Risk Mitigation (From admission, onward)         " Ordered     enoxaparin injection 40 mg  Every 24 hours      08/01/20 0849     IP VTE HIGH RISK PATIENT  Once      08/01/20 0849     Place sequential compression device  Until discontinued      08/01/20 0849                  Department Hospital Medicine  [facility]  Mallika Tyler MD  Date of service: 08/02/2020

## 2020-08-02 NOTE — RESPIRATORY THERAPY
This note also relates to the following rows which could not be included:  Oxygen Concentration (%) - Cannot attach notes to unvalidated device data  SpO2 - Cannot attach notes to unvalidated device data  Pulse - Cannot attach notes to unvalidated device data  Resp - Cannot attach notes to unvalidated device data  Ventilation Type - Cannot attach notes to unvalidated device data  Vent Mode - Cannot attach notes to unvalidated device data  Set Rate - Cannot attach notes to unvalidated device data  Vt Set - Cannot attach notes to unvalidated device data  PEEP/CPAP - Cannot attach notes to unvalidated device data  Pressure Support - Cannot attach notes to unvalidated device data  Waveform - Cannot attach notes to unvalidated device data  Peak Flow - Cannot attach notes to unvalidated device data  Plateau Set/Insp. Hold (sec) - Cannot attach notes to unvalidated device data  Insp Rise Time  - Cannot attach notes to unvalidated device data  Trigger Sensitivity Flow/I-Trigger - Cannot attach notes to unvalidated device data  Resp Rate Total - Cannot attach notes to unvalidated device data  Peak Airway Pressure - Cannot attach notes to unvalidated device data  Mean Airway Pressure - Cannot attach notes to unvalidated device data  Plateau Pressure - Cannot attach notes to unvalidated device data  Exhaled Vt - Cannot attach notes to unvalidated device data  Total Ve - Cannot attach notes to unvalidated device data  Spont Ve - Cannot attach notes to unvalidated device data  I:E Ratio Measured - Cannot attach notes to unvalidated device data  Resp Rate High Alarm - Cannot attach notes to unvalidated device data  Press High Alarm - Cannot attach notes to unvalidated device data  Apnea Rate - Cannot attach notes to unvalidated device data  Apnea Volume (mL) - Cannot attach notes to unvalidated device data  Apnea Oxygen Concentration  - Cannot attach notes to unvalidated device data  Apnea Flow Rate (L/min) - Cannot attach notes  to unvalidated device data  T Apnea - Cannot attach notes to unvalidated device data       08/02/20 0345   PRE-TX-O2   O2 Device (Oxygen Therapy) ventilator   Pulse Oximetry Type Continuous   Respiratory Interventions   Airway/Ventilation Management airway patency maintained   Vent Select   Conventional Vent Y   Charged w/in last 24h YES   Preset Conventional Ventilator Settings   Vent ID [id number]   Vent Type    Humidity HME   Education   $ Education Ventilator Oxygen;Other (see comment);15 min  (Explained CPAP trials)   Respiratory Evaluation   $ Care Plan Tech Time 15 min   Evaluation For   (care plan)   Placed pt on Spont mode for CPAP trials.

## 2020-08-02 NOTE — RESPIRATORY THERAPY
This note also relates to the following rows which could not be included:  Oxygen Concentration (%) - Cannot attach notes to unvalidated device data  Ventilation Type - Cannot attach notes to unvalidated device data  Vent Mode - Cannot attach notes to unvalidated device data  Set Rate - Cannot attach notes to unvalidated device data  Vt Set - Cannot attach notes to unvalidated device data  PEEP/CPAP - Cannot attach notes to unvalidated device data  Pressure Support - Cannot attach notes to unvalidated device data  Waveform - Cannot attach notes to unvalidated device data  Peak Flow - Cannot attach notes to unvalidated device data  Plateau Set/Insp. Hold (sec) - Cannot attach notes to unvalidated device data  Trigger Sensitivity Flow/I-Trigger - Cannot attach notes to unvalidated device data  Resp Rate Total - Cannot attach notes to unvalidated device data  Peak Airway Pressure - Cannot attach notes to unvalidated device data  Mean Airway Pressure - Cannot attach notes to unvalidated device data  Plateau Pressure - Cannot attach notes to unvalidated device data  Exhaled Vt - Cannot attach notes to unvalidated device data  Total Ve - Cannot attach notes to unvalidated device data  I:E Ratio Measured - Cannot attach notes to unvalidated device data  Resp Rate High Alarm - Cannot attach notes to unvalidated device data  Press High Alarm - Cannot attach notes to unvalidated device data  Apnea Rate - Cannot attach notes to unvalidated device data  Apnea Volume (mL) - Cannot attach notes to unvalidated device data  Apnea Oxygen Concentration  - Cannot attach notes to unvalidated device data  Apnea Flow Rate (L/min) - Cannot attach notes to unvalidated device data  T Apnea - Cannot attach notes to unvalidated device data       08/01/20 2233   Patient Assessment/Suction   Level of Consciousness (AVPU) responds to voice   Respiratory Effort Unlabored   Expansion/Accessory Muscles/Retractions expansion symmetric;no  retractions;no use of accessory muscles   All Lung Fields Breath Sounds clear   Rhythm/Pattern, Respiratory assisted mechanically   PRE-TX-O2   O2 Device (Oxygen Therapy) ventilator   SpO2 (!) 94 %   Pulse Oximetry Type Continuous   Pulse (!) 58   Resp (!) 22   Respiratory Interventions   Airway/Ventilation Management airway patency maintained   VAP Prevention Bundle HOB elevation maintained   Vent Select   Conventional Vent Y   Charged w/in last 24h YES   Preset Conventional Ventilator Settings   Vent ID [id number]   Vent Type    Humidity E   Respiratory Evaluation   $ Care Plan Tech Time 15 min   Evaluation For   (care plan)        08/01/20 9258   Patient Assessment/Suction   Level of Consciousness (AVPU) responds to voice   Respiratory Effort Unlabored   Expansion/Accessory Muscles/Retractions expansion symmetric;no retractions;no use of accessory muscles   All Lung Fields Breath Sounds clear   Rhythm/Pattern, Respiratory assisted mechanically   PRE-TX-O2   O2 Device (Oxygen Therapy) ventilator   SpO2 (!) 94 %   Pulse Oximetry Type Continuous   Pulse (!) 58   Resp (!) 22   Respiratory Interventions   Airway/Ventilation Management airway patency maintained   VAP Prevention Bundle HOB elevation maintained   Vent Select   Conventional Vent Y   Charged w/in last 24h YES   Preset Conventional Ventilator Settings   Vent ID [id number]   Vent Type    Humidity E   Respiratory Evaluation   $ Care Plan Tech Time 15 min   Evaluation For   (care plan)

## 2020-08-02 NOTE — CARE UPDATE
This note also relates to the following rows which could not be included:  Oxygen Concentration (%) - Cannot attach notes to unvalidated device data  SpO2 - Cannot attach notes to unvalidated device data  Pulse - Cannot attach notes to unvalidated device data  Resp - Cannot attach notes to unvalidated device data  Ventilation Type - Cannot attach notes to unvalidated device data  Vent Mode - Cannot attach notes to unvalidated device data  Vt Set - Cannot attach notes to unvalidated device data  PEEP/CPAP - Cannot attach notes to unvalidated device data  Pressure Support - Cannot attach notes to unvalidated device data  Waveform - Cannot attach notes to unvalidated device data  Peak Flow - Cannot attach notes to unvalidated device data  Plateau Set/Insp. Hold (sec) - Cannot attach notes to unvalidated device data  Insp Rise Time  - Cannot attach notes to unvalidated device data  Trigger Sensitivity Flow/I-Trigger - Cannot attach notes to unvalidated device data  Resp Rate Total - Cannot attach notes to unvalidated device data  Peak Airway Pressure - Cannot attach notes to unvalidated device data  Mean Airway Pressure - Cannot attach notes to unvalidated device data  Plateau Pressure - Cannot attach notes to unvalidated device data  Exhaled Vt - Cannot attach notes to unvalidated device data  Total Ve - Cannot attach notes to unvalidated device data  Spont Ve - Cannot attach notes to unvalidated device data  I:E Ratio Measured - Cannot attach notes to unvalidated device data  Resp Rate High Alarm - Cannot attach notes to unvalidated device data  Press High Alarm - Cannot attach notes to unvalidated device data  Apnea Rate - Cannot attach notes to unvalidated device data  Apnea Volume (mL) - Cannot attach notes to unvalidated device data  Apnea Oxygen Concentration  - Cannot attach notes to unvalidated device data  Apnea Flow Rate (L/min) - Cannot attach notes to unvalidated device data  T Apnea - Cannot attach notes  to unvalidated device data       08/02/20 0768   Patient Assessment/Suction   Level of Consciousness (AVPU) alert   Respiratory Effort Normal;Unlabored   Expansion/Accessory Muscles/Retractions no use of accessory muscles   All Lung Fields Breath Sounds diminished;clear   Rhythm/Pattern, Respiratory unlabored;pattern regular;depth regular;assisted mechanically   Cough Frequency with stimulation   $ Suction Charges Inline Suction Procedure Stat Charge   Secretions Amount small   Secretions Color pale;yellow   Secretions Characteristics thin   PRE-TX-O2   O2 Device (Oxygen Therapy) ventilator   $ Is the patient on Low Flow Oxygen? Yes   Pulse Oximetry Type Continuous   $ Pulse Oximetry - Multiple Charge Pulse Oximetry - Multiple   Aerosol Therapy   $ Aerosol Therapy Charges PRN treatment not required        Airway - Non-Surgical 08/01/20 0531 Endotracheal Tube   Placement Date/Time: 08/01/20 0531   Present Prior to Hospital Arrival?: No  Method of Intubation: Glidescope  Inserted by: MD (c)  Airway Device: Endotracheal Tube  Intubated: Postinduction  Airway Device Size: 7.5  Style: Cuffed  Cuff Inflation: Min...   Secured at 22 cm   Measured At Lips   Secured Location Right   Secured by Commercial tube alvarez   Bite Block none   Cuff Air Leak Pressure   (MLT)   Airway Safety   Ambu bag with the patient? Yes, Adult Ambu   Is mask with the patient? Yes, Adult Mask   Vent Select   Conventional Vent Y   $ Ventilator Subsequent 1   Charged w/in last 24h YES   Preset Conventional Ventilator Settings   Vent ID [id number]   Vent Type    Humidity HME   Conventional Ventilator Alarms   Alarms On Y   Ve High Alarm 25 L/min   Ve Low Alarm 3 L/min   Vt High Alarm 1800 mL   Vt Low Alarm 300 mL   IHI Ventilator Associated Pneumonia Bundle   Head of Bed Elevated  HOB 45   Additional VAP Prevention Documentation Oral suctioning prior to turn;Clean equipment maintained   Education   $ Education Suction;Ventilator Oxygen;15  min;Bronchodilator;COPD   Respiratory Evaluation   $ Care Plan Tech Time 15 min

## 2020-08-02 NOTE — RESPIRATORY THERAPY
This note also relates to the following rows which could not be included:  Oxygen Concentration (%) - Cannot attach notes to unvalidated device data  SpO2 - Cannot attach notes to unvalidated device data  Pulse - Cannot attach notes to unvalidated device data  Resp - Cannot attach notes to unvalidated device data  Ventilation Type - Cannot attach notes to unvalidated device data  Vent Mode - Cannot attach notes to unvalidated device data  Set Rate - Cannot attach notes to unvalidated device data  Vt Set - Cannot attach notes to unvalidated device data  PEEP/CPAP - Cannot attach notes to unvalidated device data  Pressure Support - Cannot attach notes to unvalidated device data  Waveform - Cannot attach notes to unvalidated device data  Peak Flow - Cannot attach notes to unvalidated device data  Plateau Set/Insp. Hold (sec) - Cannot attach notes to unvalidated device data  Trigger Sensitivity Flow/I-Trigger - Cannot attach notes to unvalidated device data  Resp Rate Total - Cannot attach notes to unvalidated device data  Peak Airway Pressure - Cannot attach notes to unvalidated device data  Mean Airway Pressure - Cannot attach notes to unvalidated device data  Plateau Pressure - Cannot attach notes to unvalidated device data  Exhaled Vt - Cannot attach notes to unvalidated device data  Total Ve - Cannot attach notes to unvalidated device data  I:E Ratio Measured - Cannot attach notes to unvalidated device data  Resp Rate High Alarm - Cannot attach notes to unvalidated device data  Press High Alarm - Cannot attach notes to unvalidated device data  Apnea Rate - Cannot attach notes to unvalidated device data  Apnea Volume (mL) - Cannot attach notes to unvalidated device data  Apnea Oxygen Concentration  - Cannot attach notes to unvalidated device data  Apnea Flow Rate (L/min) - Cannot attach notes to unvalidated device data  T Apnea - Cannot attach notes to unvalidated device data       08/01/20 2001   Patient  Assessment/Suction   Level of Consciousness (AVPU) alert   Respiratory Effort Unlabored   All Lung Fields Breath Sounds clear   Rhythm/Pattern, Respiratory assisted mechanically   Suction Method oral   PRE-TX-O2   O2 Device (Oxygen Therapy) ventilator   Aerosol Therapy   $ Aerosol Therapy Charges Aerosol Treatment   Daily Review of Necessity (SVN) completed   Respiratory Treatment Status (SVN) given   Treatment Route (SVN) in-line   Patient Position (SVN) semi-Nascimento's   Post Treatment Assessment (SVN) breath sounds unchanged   Signs of Intolerance (SVN) none   Breath Sounds Post-Respiratory Treatment   Throughout All Fields Post-Treatment All Fields   Throughout All Fields Post-Treatment no change   Post-treatment Heart Rate (beats/min) 62   Post-treatment Resp Rate (breaths/min) 19   Vent Select   Conventional Vent Y   Charged w/in last 24h YES   Preset Conventional Ventilator Settings   Vent ID [id number]   Vent Type    Humidity HME   Education   $ Education Bronchodilator;15 min   Respiratory Evaluation   $ Care Plan Tech Time 15 min   Evaluation For   (care plan)        08/01/20 2001   Patient Assessment/Suction   Level of Consciousness (AVPU) alert   Respiratory Effort Unlabored   All Lung Fields Breath Sounds clear   Rhythm/Pattern, Respiratory assisted mechanically   Suction Method oral   PRE-TX-O2   O2 Device (Oxygen Therapy) ventilator   Aerosol Therapy   $ Aerosol Therapy Charges Aerosol Treatment   Daily Review of Necessity (SVN) completed   Respiratory Treatment Status (SVN) given   Treatment Route (SVN) in-line   Patient Position (SVN) semi-Nascimento's   Post Treatment Assessment (SVN) breath sounds unchanged   Signs of Intolerance (SVN) none   Breath Sounds Post-Respiratory Treatment   Throughout All Fields Post-Treatment All Fields   Throughout All Fields Post-Treatment no change   Post-treatment Heart Rate (beats/min) 62   Post-treatment Resp Rate (breaths/min) 19   Vent Select   Conventional Vent Y    Charged w/in last 24h YES   Preset Conventional Ventilator Settings   Vent ID [id number]   Vent Type    Humidity HME   Education   $ Education Bronchodilator;15 min   Respiratory Evaluation   $ Care Plan Tech Time 15 min   Evaluation For   (care plan)

## 2020-08-03 PROBLEM — I50.9 CHF (CONGESTIVE HEART FAILURE): Status: ACTIVE | Noted: 2020-08-03

## 2020-08-03 PROBLEM — R09.2 RESPIRATORY ARREST: Status: RESOLVED | Noted: 2020-08-01 | Resolved: 2020-08-03

## 2020-08-03 LAB
ALBUMIN SERPL BCP-MCNC: 3.9 G/DL (ref 3.5–5.2)
ALP SERPL-CCNC: 45 U/L (ref 55–135)
ALT SERPL W/O P-5'-P-CCNC: 35 U/L (ref 10–44)
ANION GAP SERPL CALC-SCNC: 13 MMOL/L (ref 8–16)
ANISOCYTOSIS BLD QL SMEAR: SLIGHT
AST SERPL-CCNC: 18 U/L (ref 10–40)
BACTERIA SPEC AEROBE CULT: ABNORMAL
BASOPHILS NFR BLD: 0 % (ref 0–1.9)
BILIRUB SERPL-MCNC: 0.7 MG/DL (ref 0.1–1)
BUN SERPL-MCNC: 35 MG/DL (ref 8–23)
CALCIUM SERPL-MCNC: 8.6 MG/DL (ref 8.7–10.5)
CHLORIDE SERPL-SCNC: 96 MMOL/L (ref 95–110)
CO2 SERPL-SCNC: 27 MMOL/L (ref 23–29)
CREAT SERPL-MCNC: 1.1 MG/DL (ref 0.5–1.4)
D DIMER PPP IA.FEU-MCNC: 0.86 UG/ML FEU
DIFFERENTIAL METHOD: ABNORMAL
EOSINOPHIL NFR BLD: 0 % (ref 0–8)
ERYTHROCYTE [DISTWIDTH] IN BLOOD BY AUTOMATED COUNT: 13.2 % (ref 11.5–14.5)
EST. GFR  (AFRICAN AMERICAN): >60 ML/MIN/1.73 M^2
EST. GFR  (NON AFRICAN AMERICAN): >60 ML/MIN/1.73 M^2
GLUCOSE SERPL-MCNC: 201 MG/DL (ref 70–110)
GLUCOSE SERPL-MCNC: 234 MG/DL (ref 70–110)
GRAM STN SPEC: ABNORMAL
HCT VFR BLD AUTO: 43.1 % (ref 40–54)
HGB BLD-MCNC: 14.3 G/DL (ref 14–18)
IMM GRANULOCYTES # BLD AUTO: ABNORMAL K/UL (ref 0–0.04)
IMM GRANULOCYTES NFR BLD AUTO: ABNORMAL % (ref 0–0.5)
LYMPHOCYTES NFR BLD: 3 % (ref 18–48)
MAGNESIUM SERPL-MCNC: 2.2 MG/DL (ref 1.6–2.6)
MCH RBC QN AUTO: 29.3 PG (ref 27–31)
MCHC RBC AUTO-ENTMCNC: 33.2 G/DL (ref 32–36)
MCV RBC AUTO: 88 FL (ref 82–98)
MONOCYTES NFR BLD: 0 % (ref 4–15)
NEUTROPHILS NFR BLD: 95 % (ref 38–73)
NEUTS BAND NFR BLD MANUAL: 2 %
NRBC BLD-RTO: 0 /100 WBC
PHOSPHATE SERPL-MCNC: 2.6 MG/DL (ref 2.7–4.5)
PLATELET # BLD AUTO: 278 K/UL (ref 150–350)
PMV BLD AUTO: 9.6 FL (ref 9.2–12.9)
POTASSIUM SERPL-SCNC: 3.1 MMOL/L (ref 3.5–5.1)
PROT SERPL-MCNC: 7.2 G/DL (ref 6–8.4)
RBC # BLD AUTO: 4.88 M/UL (ref 4.6–6.2)
SODIUM SERPL-SCNC: 136 MMOL/L (ref 136–145)
WBC # BLD AUTO: 23.66 K/UL (ref 3.9–12.7)

## 2020-08-03 PROCEDURE — 63600175 PHARM REV CODE 636 W HCPCS: Performed by: INTERNAL MEDICINE

## 2020-08-03 PROCEDURE — 25000003 PHARM REV CODE 250

## 2020-08-03 PROCEDURE — 84100 ASSAY OF PHOSPHORUS: CPT

## 2020-08-03 PROCEDURE — 25000242 PHARM REV CODE 250 ALT 637 W/ HCPCS: Performed by: INTERNAL MEDICINE

## 2020-08-03 PROCEDURE — 85007 BL SMEAR W/DIFF WBC COUNT: CPT

## 2020-08-03 PROCEDURE — 94761 N-INVAS EAR/PLS OXIMETRY MLT: CPT

## 2020-08-03 PROCEDURE — 99900035 HC TECH TIME PER 15 MIN (STAT)

## 2020-08-03 PROCEDURE — 63600175 PHARM REV CODE 636 W HCPCS: Performed by: HOSPITALIST

## 2020-08-03 PROCEDURE — 36415 COLL VENOUS BLD VENIPUNCTURE: CPT

## 2020-08-03 PROCEDURE — 99233 PR SUBSEQUENT HOSPITAL CARE,LEVL III: ICD-10-PCS | Mod: ,,, | Performed by: INTERNAL MEDICINE

## 2020-08-03 PROCEDURE — 99233 SBSQ HOSP IP/OBS HIGH 50: CPT | Mod: ,,, | Performed by: INTERNAL MEDICINE

## 2020-08-03 PROCEDURE — 80053 COMPREHEN METABOLIC PANEL: CPT

## 2020-08-03 PROCEDURE — 25000003 PHARM REV CODE 250: Performed by: HOSPITALIST

## 2020-08-03 PROCEDURE — 94640 AIRWAY INHALATION TREATMENT: CPT

## 2020-08-03 PROCEDURE — 85379 FIBRIN DEGRADATION QUANT: CPT

## 2020-08-03 PROCEDURE — 21400001 HC TELEMETRY ROOM

## 2020-08-03 PROCEDURE — 27000221 HC OXYGEN, UP TO 24 HOURS

## 2020-08-03 PROCEDURE — 25000003 PHARM REV CODE 250: Performed by: NURSE PRACTITIONER

## 2020-08-03 PROCEDURE — 85027 COMPLETE CBC AUTOMATED: CPT

## 2020-08-03 PROCEDURE — 83735 ASSAY OF MAGNESIUM: CPT

## 2020-08-03 RX ORDER — LISINOPRIL 20 MG/1
40 TABLET ORAL DAILY
Status: DISCONTINUED | OUTPATIENT
Start: 2020-08-03 | End: 2020-08-05 | Stop reason: HOSPADM

## 2020-08-03 RX ORDER — SODIUM,POTASSIUM PHOSPHATES 280-250MG
2 POWDER IN PACKET (EA) ORAL ONCE
Status: COMPLETED | OUTPATIENT
Start: 2020-08-03 | End: 2020-08-03

## 2020-08-03 RX ORDER — CHLORHEXIDINE GLUCONATE ORAL RINSE 1.2 MG/ML
15 SOLUTION DENTAL 2 TIMES DAILY
Status: DISCONTINUED | OUTPATIENT
Start: 2020-08-03 | End: 2020-08-03

## 2020-08-03 RX ORDER — LISINOPRIL 20 MG/1
20 TABLET ORAL DAILY
Status: DISCONTINUED | OUTPATIENT
Start: 2020-08-03 | End: 2020-08-03

## 2020-08-03 RX ORDER — IPRATROPIUM BROMIDE AND ALBUTEROL SULFATE 2.5; .5 MG/3ML; MG/3ML
3 SOLUTION RESPIRATORY (INHALATION) EVERY 6 HOURS
Status: DISCONTINUED | OUTPATIENT
Start: 2020-08-03 | End: 2020-08-04

## 2020-08-03 RX ORDER — PREDNISONE 20 MG/1
40 TABLET ORAL NIGHTLY
Status: DISCONTINUED | OUTPATIENT
Start: 2020-08-03 | End: 2020-08-05 | Stop reason: HOSPADM

## 2020-08-03 RX ORDER — CEPHALEXIN 250 MG/1
1000 CAPSULE ORAL EVERY 8 HOURS
Status: DISCONTINUED | OUTPATIENT
Start: 2020-08-03 | End: 2020-08-05 | Stop reason: HOSPADM

## 2020-08-03 RX ORDER — MUPIROCIN 20 MG/G
OINTMENT TOPICAL 2 TIMES DAILY
Status: DISCONTINUED | OUTPATIENT
Start: 2020-08-03 | End: 2020-08-03

## 2020-08-03 RX ORDER — METOPROLOL SUCCINATE 25 MG/1
25 TABLET, EXTENDED RELEASE ORAL DAILY
Status: DISCONTINUED | OUTPATIENT
Start: 2020-08-03 | End: 2020-08-05 | Stop reason: HOSPADM

## 2020-08-03 RX ADMIN — POTASSIUM CHLORIDE 40 MEQ: 20 TABLET, EXTENDED RELEASE ORAL at 06:08

## 2020-08-03 RX ADMIN — FUROSEMIDE 40 MG: 10 INJECTION, SOLUTION INTRAMUSCULAR; INTRAVENOUS at 10:08

## 2020-08-03 RX ADMIN — ENOXAPARIN SODIUM 40 MG: 100 INJECTION SUBCUTANEOUS at 05:08

## 2020-08-03 RX ADMIN — IPRATROPIUM BROMIDE AND ALBUTEROL SULFATE 3 ML: .5; 3 SOLUTION RESPIRATORY (INHALATION) at 12:08

## 2020-08-03 RX ADMIN — SENNOSIDES AND DOCUSATE SODIUM 1 TABLET: 8.6; 5 TABLET ORAL at 08:08

## 2020-08-03 RX ADMIN — MELATONIN 6 MG: at 10:08

## 2020-08-03 RX ADMIN — CEPHALEXIN 1000 MG: 250 CAPSULE ORAL at 05:08

## 2020-08-03 RX ADMIN — MUPIROCIN: 20 OINTMENT TOPICAL at 08:08

## 2020-08-03 RX ADMIN — FUROSEMIDE 40 MG: 10 INJECTION, SOLUTION INTRAMUSCULAR; INTRAVENOUS at 09:08

## 2020-08-03 RX ADMIN — PREDNISONE 40 MG: 20 TABLET ORAL at 09:08

## 2020-08-03 RX ADMIN — CEFTRIAXONE 1 G: 1 INJECTION, SOLUTION INTRAVENOUS at 10:08

## 2020-08-03 RX ADMIN — LISINOPRIL 40 MG: 20 TABLET ORAL at 12:08

## 2020-08-03 RX ADMIN — CHLORHEXIDINE GLUCONATE 15 ML: 1.2 RINSE ORAL at 08:08

## 2020-08-03 RX ADMIN — METHYLPREDNISOLONE SODIUM SUCCINATE 80 MG: 40 INJECTION, POWDER, FOR SOLUTION INTRAMUSCULAR; INTRAVENOUS at 08:08

## 2020-08-03 RX ADMIN — CEPHALEXIN 1000 MG: 250 CAPSULE ORAL at 09:08

## 2020-08-03 RX ADMIN — IPRATROPIUM BROMIDE AND ALBUTEROL SULFATE 3 ML: .5; 3 SOLUTION RESPIRATORY (INHALATION) at 08:08

## 2020-08-03 RX ADMIN — SENNOSIDES AND DOCUSATE SODIUM 1 TABLET: 8.6; 5 TABLET ORAL at 09:08

## 2020-08-03 RX ADMIN — INSULIN ASPART 2 UNITS: 100 INJECTION, SOLUTION INTRAVENOUS; SUBCUTANEOUS at 10:08

## 2020-08-03 RX ADMIN — POLYETHYLENE GLYCOL 3350 17 G: 17 POWDER, FOR SOLUTION ORAL at 08:08

## 2020-08-03 RX ADMIN — METOPROLOL SUCCINATE 25 MG: 25 TABLET, FILM COATED, EXTENDED RELEASE ORAL at 12:08

## 2020-08-03 RX ADMIN — BUPRENORPHINE AND NALOXONE 1 EACH: 8; 2 FILM, SOLUBLE BUCCAL; SUBLINGUAL at 10:08

## 2020-08-03 RX ADMIN — POTASSIUM, SODIUM PHOSPHATES 280 MG-160 MG-250 MG ORAL POWDER PACKET 2 PACKET: POWDER IN PACKET at 08:08

## 2020-08-03 RX ADMIN — CEFTRIAXONE 1 G: 1 INJECTION, SOLUTION INTRAVENOUS at 12:08

## 2020-08-03 RX ADMIN — ASPIRIN 81 MG: 81 TABLET, DELAYED RELEASE ORAL at 08:08

## 2020-08-03 RX ADMIN — IPRATROPIUM BROMIDE AND ALBUTEROL SULFATE 3 ML: .5; 3 SOLUTION RESPIRATORY (INHALATION) at 04:08

## 2020-08-03 NOTE — PLAN OF CARE
PT SHOULD GO HOME WITH NO CM NEEDS. CM TO FOLLOW UNTIL DISCHARGE FROM HOSPITAL.     08/03/20 0939   Discharge Assessment   Assessment Type Discharge Planning Assessment   Confirmed/corrected address and phone number on facesheet? Yes   Assessment information obtained from? Patient   Communicated expected length of stay with patient/caregiver no   Prior to hospitilization cognitive status: Alert/Oriented   Prior to hospitalization functional status: Independent   Current cognitive status: Alert/Oriented   Current Functional Status: Needs Assistance   Facility Arrived From: HOME   Lives With sibling(s)   Able to Return to Prior Arrangements yes   Is patient able to care for self after discharge? Yes   Who are your caregiver(s) and their phone number(s)? ESTER GRIFFITH   Patient's perception of discharge disposition home or selfcare   Readmission Within the Last 30 Days no previous admission in last 30 days   Patient currently being followed by outpatient case management? No   Patient currently receives any other outside agency services? No   Equipment Currently Used at Home none   Part D Coverage MEDICAID   Do you have any problems affording any of your prescribed medications? No   Is the patient taking medications as prescribed? yes   Does the patient have transportation home? Yes   Transportation Anticipated family or friend will provide   Does the patient receive services at the [facility]? No   Discharge Plan A Home   Discharge Plan B Home   DME Needed Upon Discharge  none   Patient/Family in Agreement with Plan yes

## 2020-08-03 NOTE — PROGRESS NOTES
[facility]  Neurology  Progress Note    Patient Name: Kal Dodd  MRN: [id number]  Admission Date: 8/1/2020  Hospital Length of Stay: 2 days  Code Status: Full Code   Attending Provider: Mallika Tyler MD  Primary Care Physician: Ras Sharma MD   Principal Problem:Acute respiratory failure with hypoxia and hypercarbia    Subjective:      HPI per EMR: 60-year-old gentleman with past medical history of CAD status post stenting in 2012, chronic systolic CHF(30-35% EF), COPD, heavy tobacco use, opiate dependent, niddm, hyperlipidemia was brought by EMS due to respiratory arrest.  Upon my assessment patient is already intubated and on propofol.  History was obtained from ER physician.  As per my report from ED provider patient was complaining of shortness of breath at home and sudden collapse.  Friend called EMS and he was found to be unresponsive with agonal respiration.  Ambu bag was placed and was intubated upon arrival to ED due to severe hypoxia and hypercapnia.  According to ED physician patient has some generalized shakes that quickly resolved with propofol and immediately patient was following commands on propofol and still does on my assessment.  Patient did not lose pulse as per EMS.  No family member available at bedside.  Full review of system is not possible due to intubation and sedation.      Neurological Consult:   Patient seen examined in the ED and plan of care discussed with Dr. Trenton Phillips.  Patient was intubated at the time and could not speak but he could not his head yes and shake it know.  He was able to move all his extremities.  And followed simple commands.  EEG stat ordered along with CT head and 1 g of Keppra.     8/2: Pt seen and examined. Brother at bedside. POC discussed with Dr. Trenton Phillips. Pt arousable to vice. He can answer yes and no questions, and he also can write somewhat. He is intubated and in BL wrist restraints but communication is possible. Pt denied pain.   Follows simple commands. No seizures reported over night.    8/3:Patient seen and examined. Discussed plan of care with Dr. Suarez. Patient is awake, alert, oriented x 4, NADN, No SOB noted. Patient follow commands, Patient back to base. Inpatient workup completed. Patient can follow up outpatient 1 week after  after discharge.     Current Neurological Medications:   No current facility-administered medications on file prior to encounter.      Current Outpatient Medications on File Prior to Encounter   Medication Sig Dispense Refill    aspirin (ECOTRIN) 81 MG EC tablet Take 81 mg by mouth once daily.        buprenorphine-naloxone (SUBOXONE) 8-2 mg Film Place under the tongue once daily.      fenofibrate 160 MG Tab Take 1 tablet (160 mg total) by mouth once daily. 90 tablet 0    fluticasone propionate (FLONASE) 50 mcg/actuation nasal spray 1 spray (50 mcg total) by Each Nare route once daily. 1 Bottle 2    furosemide (LASIX) 20 MG tablet Take 1 tablet (20 mg total) by mouth 2 (two) times daily. 60 tablet 11    hydrOXYzine pamoate (VISTARIL) 25 MG Cap Take 1 capsule (25 mg total) by mouth 4 (four) times daily. 30 capsule 0    lisinopril (PRINIVIL,ZESTRIL) 40 MG tablet Take 1 tablet (40 mg total) by mouth once daily. (Patient taking differently: Take 40 mg by mouth once daily. Pt taking 1/2 tab once daily) 90 tablet 3    lovastatin (MEVACOR) 20 MG tablet Take 1 tablet by mouth once daily 90 tablet 0    metoprolol succinate (TOPROL-XL) 25 MG 24 hr tablet Take 1 tablet (25 mg total) by mouth once daily. 30 tablet 11    nitroGLYCERIN (NITROSTAT) 0.4 MG SL tablet Place 1 tablet (0.4 mg total) under the tongue every 5 (five) minutes as needed for Chest pain. 30 tablet 1    nystatin (DUKE'S SOLUTION) Take 10 mLs by mouth 4 (four) times daily. 120 mL 0    spironolactone (ALDACTONE) 25 MG tablet Take 1 tablet (25 mg total) by mouth once daily. 30 tablet 11    [DISCONTINUED] DULoxetine (CYMBALTA) 30 MG capsule Take  1 capsule (30 mg total) by mouth once daily. 90 capsule 1         Current Facility-Administered Medications   Medication Dose Route Frequency Provider Last Rate Last Dose    acetaminophen tablet 650 mg  650 mg Oral Q4H PRN Malilka Tyler MD        albuterol-ipratropium 2.5 mg-0.5 mg/3 mL nebulizer solution 3 mL  3 mL Nebulization Q6H Benjamin Palma MD   3 mL at 08/03/20 1219    aspirin EC tablet 81 mg  81 mg Oral Daily Mallika Tyler MD   81 mg at 08/03/20 0858    buprenorphine-naloxone 8-2 mg Film 1 each  1 tablet Sublingual Daily Mallika Tyler MD   1 each at 08/03/20 1042    calcium chloride 1g in sodium chloride 0.9% 100mL (ready to mix system)  1 g Intravenous PRN Mallika Tyler MD        calcium chloride 1g in sodium chloride 0.9% 100mL (ready to mix system)  1 g Intravenous PRN Mallika Tyler MD        calcium chloride 1g in sodium chloride 0.9% 100mL (ready to mix system)  1 g Intravenous PRN Mallika Tyler MD        cephALEXin capsule 1,000 mg  1,000 mg Oral Q8H Mallika Tyler MD        dextrose 50% injection 12.5 g  12.5 g Intravenous PRN Mallika Tyler MD        dextrose 50% injection 25 g  25 g Intravenous PRN Mallika Tyler MD        enoxaparin injection 40 mg  40 mg Subcutaneous Q24H Mallika Tyler MD   40 mg at 08/02/20 1700    fluticasone propionate 50 mcg/actuation nasal spray 50 mcg  1 spray Each Nostril Daily Mallika Tyler MD        furosemide injection 40 mg  40 mg Intravenous Q12H Mallika Tyler MD   40 mg at 08/03/20 1028    glucagon (human recombinant) injection 1 mg  1 mg Intramuscular PRN Mallika Tyler MD        glucose chewable tablet 16 g  16 g Oral PRN Mallika Tyler MD        glucose chewable tablet 24 g  24 g Oral PRN Mallika Tyler MD        HYDROcodone-acetaminophen 5-325 mg per tablet 1 tablet  1 tablet Oral Q6H PRN Mallika Tyler MD        insulin aspart U-100 pen 1-10 Units  1-10 Units Subcutaneous QID (AC + HS) PRN  Mallika Tyler MD        lisinopriL tablet 40 mg  40 mg Oral Daily Ni Diggs, NP   40 mg at 08/03/20 1215    magnesium oxide tablet 800 mg  800 mg Oral PRN Mallika Tyler MD        magnesium sulfate 2g in water 50mL IVPB (premix)  2 g Intravenous PRN Mallika Tyler MD        magnesium sulfate 2g in water 50mL IVPB (premix)  4 g Intravenous PRN Mallika Tyler MD        magnesium sulfate 2g in water 50mL IVPB (premix)  2 g Intravenous PRN Mallika Tyler MD        magnesium sulfate in dextrose IVPB (premix) 1 g  1 g Intravenous PRN Mallika Tyler MD        melatonin tablet 6 mg  6 mg Oral Nightly PRN Mallika Tyler MD        metoprolol succinate (TOPROL-XL) 24 hr tablet 25 mg  25 mg Oral Daily Mallika Tyler MD   25 mg at 08/03/20 1210    morphine injection 2 mg  2 mg Intravenous Q4H PRN Mallika Tyler MD        ondansetron injection 4 mg  4 mg Intravenous Q8H PRN Mallika Tyler MD   4 mg at 08/02/20 1532    polyethylene glycol packet 17 g  17 g Oral Daily Mallika Tyler MD   17 g at 08/03/20 0858    potassium chloride 10 mEq in 100 mL IVPB  20 mEq Intravenous PRN Mallika Tyler MD        potassium chloride 10 mEq in 100 mL IVPB  40 mEq Intravenous PRN Mallika Tyler MD        potassium chloride 10 mEq in 100 mL IVPB  20 mEq Intravenous PRN Mallika Tyler MD        potassium chloride 10 mEq in 100 mL IVPB  40 mEq Intravenous PRN Mallika Tyler MD        potassium chloride SA CR tablet 20 mEq  20 mEq Oral PRN Mallika Tyler MD        potassium chloride SA CR tablet 20 mEq  20 mEq Oral PRN Mallika Tyler MD        potassium chloride SA CR tablet 40 mEq  40 mEq Oral PRN Mallika Tyler MD        potassium chloride SA CR tablet 40 mEq  40 mEq Oral PRN Mallika Tyler MD   40 mEq at 08/03/20 0613    predniSONE tablet 40 mg  40 mg Oral QHS Benjamin Palma MD        promethazine (PHENERGAN) 6.25 mg in dextrose 5 % 50 mL IVPB  6.25 mg Intravenous  Q6H PRN Mallika Tyler MD        senna-docusate 8.6-50 mg per tablet 1 tablet  1 tablet Oral BID Mallika Tyler MD   1 tablet at 08/03/20 0858    sodium chloride 0.9% flush 10 mL  10 mL Intravenous PRN Mallika Tyler MD        sodium phosphate 15 mmol in dextrose 5 % 250 mL IVPB  15 mmol Intravenous PRN Mallika Tyler MD        sodium phosphate 15 mmol in dextrose 5 % 250 mL IVPB  15 mmol Intravenous PRN Mallika Tyler MD        sodium phosphate 20.01 mmol in dextrose 5 % 250 mL IVPB  20.01 mmol Intravenous PRN Mallika Tyler MD        sodium phosphate 20.01 mmol in dextrose 5 % 250 mL IVPB  20.01 mmol Intravenous PRN Mallika Tyler MD        sodium phosphate 30 mmol in dextrose 5 % 250 mL IVPB  30 mmol Intravenous PRN Mallika Tyler MD           Review of Systems   All other systems reviewed and are negative.    Objective:     Vital Signs (Most Recent):  Temp: 98 °F (36.7 °C) (08/03/20 0715)  Pulse: 68 (08/03/20 1219)  Resp: 20 (08/03/20 1219)  BP: (!) 113/59 (08/03/20 1200)  SpO2: 95 % (08/03/20 1219) Vital Signs (24h Range):  Temp:  [97.6 °F (36.4 °C)-98 °F (36.7 °C)] 98 °F (36.7 °C)  Pulse:  [48-90] 68  Resp:  [10-36] 20  SpO2:  [90 %-100 %] 95 %  BP: (109-182)/(55-83) 113/59     Weight: 83.3 kg (183 lb 10.3 oz)  Body mass index is 29.64 kg/m².    Physical Exam  Constitutional:       Appearance: Normal appearance. He is well-developed.   HENT:      Head: Normocephalic and atraumatic.   Eyes:      Pupils: Pupils are equal, round, and reactive to light.   Neck:      Musculoskeletal: Normal range of motion and neck supple.   Cardiovascular:      Rate and Rhythm: Normal rate.   Pulmonary:      Effort: Pulmonary effort is normal.      Breath sounds: Normal breath sounds.   Abdominal:      General: Bowel sounds are normal.      Palpations: Abdomen is soft.   Musculoskeletal: Normal range of motion.   Skin:     General: Skin is warm and dry.      Capillary Refill: Capillary refill takes  less than 2 seconds.   Neurological:      General: No focal deficit present.      Mental Status: He is alert and oriented to person, place, and time. Mental status is at baseline.      GCS: GCS eye subscore is 4. GCS verbal subscore is 5. GCS motor subscore is 6.      Cranial Nerves: No cranial nerve deficit.      Sensory: Sensation is intact. No sensory deficit.      Motor: Motor function is intact. No abnormal muscle tone.      Coordination: Coordination is intact. Coordination normal. Finger-Nose-Finger Test normal.      Gait: Gait is intact.      Deep Tendon Reflexes: Reflexes are normal and symmetric. Reflexes normal.   Psychiatric:         Attention and Perception: Attention and perception normal.         Mood and Affect: Mood normal.         Speech: Speech normal.         Behavior: Behavior normal. Behavior is cooperative.         Thought Content: Thought content normal.         Cognition and Memory: Cognition and memory normal.         Judgment: Judgment normal.         NEUROLOGICAL EXAMINATION:     MENTAL STATUS   Oriented to person, place, and time.   Registration: recalls 3 of 3 objects. Recall at 5 minutes: recalls 3 of 3 objects.   Attention: normal. Concentration: normal.   Speech: speech is normal   Level of consciousness: alert  Able to name object. Able to repeat. Normal comprehension. Praxis: normal     CRANIAL NERVES     CN II   Visual fields full to confrontation.     CN III, IV, VI   Pupils are equal, round, and reactive to light.  Right pupil: Size: 3 mm. Shape: regular.   Left pupil: Size: 3 mm. Shape: regular.     CN V   Facial sensation intact.     CN VII   Facial expression full, symmetric.     CN VIII   CN VIII normal.     CN XII   CN XII normal.     MOTOR EXAM   Muscle bulk: normal  Overall muscle tone: normal  Right arm tone: normal  Left arm tone: normal  Right leg tone: normal  Left leg tone: normal    SENSORY EXAM   Light touch normal.   Vibration normal.   Proprioception normal.      GAIT AND COORDINATION     Gait  Gait: normal     Coordination   Finger to nose coordination: normal      Significant Labs:   Hemoglobin A1c:   BMP:   Recent Labs   Lab 08/02/20  0336 08/03/20  0409   * 234*    136   K 3.8 3.1*    96   CO2 24 27   BUN 23 35*   CREATININE 0.9 1.1   CALCIUM 8.8 8.6*   MG 1.9 2.2     CBC:   Recent Labs   Lab 08/02/20  0336 08/02/20  0543 08/02/20  1134 08/03/20  0409   WBC 17.34*  --   --  23.66*   HGB 15.6  --   --  14.3   HCT 48.1 48 51 43.1     --   --  278     CMP:   Recent Labs   Lab 08/02/20  0336 08/03/20  0409   * 234*    136   K 3.8 3.1*    96   CO2 24 27   BUN 23 35*   CREATININE 0.9 1.1   CALCIUM 8.8 8.6*   MG 1.9 2.2   PROT 7.1 7.2   ALBUMIN 3.9 3.9   BILITOT 0.6 0.7   ALKPHOS 49* 45*   AST 30 18   ALT 52* 35   ANIONGAP 14 13   EGFRNONAA >60.0 >60.0     Urine Culture: No results for input(s): LABURIN in the last 48 hours.  Significant Labs:         Lab Results   Component Value Date     WBC 17.34 (H) 08/02/2020     HGB 15.6 08/02/2020     HCT 51 08/02/2020     MCV 87 08/02/2020      08/02/2020        CMP        Sodium   Date Value Ref Range Status   08/02/2020 139 136 - 145 mmol/L Final            Potassium   Date Value Ref Range Status   08/02/2020 3.8 3.5 - 5.1 mmol/L Final            Chloride   Date Value Ref Range Status   08/02/2020 101 95 - 110 mmol/L Final            CO2   Date Value Ref Range Status   08/02/2020 24 23 - 29 mmol/L Final            Glucose   Date Value Ref Range Status   08/02/2020 173 (H) 70 - 110 mg/dL Final            BUN, Bld   Date Value Ref Range Status   08/02/2020 23 8 - 23 mg/dL Final            Creatinine   Date Value Ref Range Status   08/02/2020 0.9 0.5 - 1.4 mg/dL Final            Calcium   Date Value Ref Range Status   08/02/2020 8.8 8.7 - 10.5 mg/dL Final            Total Protein   Date Value Ref Range Status   08/02/2020 7.1 6.0 - 8.4 g/dL Final            Albumin   Date Value Ref  Range Status   08/02/2020 3.9 3.5 - 5.2 g/dL Final              Total Bilirubin   Date Value Ref Range Status   08/02/2020 0.6 0.1 - 1.0 mg/dL Final       Comment:       For infants and newborns, interpretation of results should be based  on gestational age, weight and in agreement with clinical  observations.  Premature Infant recommended reference ranges:  Up to 24 hours.............<8.0 mg/dL  Up to 48 hours............<12.0 mg/dL  3-5 days..................<15.0 mg/dL  6-29 days.................<15.0 mg/dL               Alkaline Phosphatase   Date Value Ref Range Status   08/02/2020 49 (L) 55 - 135 U/L Final            AST   Date Value Ref Range Status   08/02/2020 30 10 - 40 U/L Final            ALT   Date Value Ref Range Status   08/02/2020 52 (H) 10 - 44 U/L Final            Anion Gap   Date Value Ref Range Status   08/02/2020 14 8 - 16 mmol/L Final            eGFR if    Date Value Ref Range Status   08/02/2020 >60.0 >60 mL/min/1.73 m^2 Final              eGFR if non    Date Value Ref Range Status   08/02/2020 >60.0 >60 mL/min/1.73 m^2 Final       Comment:       Calculation used to obtain the estimated glomerular filtration  rate (eGFR) is the CKD-EPI equation.               Significant Imaging: X-Ray Chest AP Portable  CLINICAL HISTORY:  60 years (1960) Male chf resp arrest     TECHNIQUE:  Portable AP radiograph the chest.     COMPARISON:  Most recent radiograph from August 1, 2020.     FINDINGS:  The lungs are clear, except for some vascular crowding bilaterally,  likely related to a suboptimal inspiration.  Costophrenic angles are  seen without effusion. No pneumothorax is identified. The heart is  mildly enlarged. The mediastinum is within normal limits. Osseous  structures appear within normal limits. The visualized upper abdomen  is unremarkable.     Lines and tubes: Endotracheal tube with tip approximately 3 cm from  the tawanda. There is an enteric tube with tip at  the GE junction.     IMPRESSION:  Mildly improved central hilar predominant interstitial opacities  suggesting improving pulmonary edema.     .     Electronically Signed by RAHUL Wright on 8/2/2020 6:21 AM          Assessment and Plan:  Pt with CHF, COPD, HTN, CAD, narcotic dependence:        Respiratory Arrest  -CHF/COPD exacerbation  -CXR: pulmonary edema  -Pt intubated  -IM/Pulmonary managing     Witnessed Seizure activity  -rhythmic jerking of head and arms  -EEG noted Abnormal indicating mild to moderate encephalopathy. No ictal discharges.  -CT head: details above: np acute abnormality  -1gram Keppra ONCE        8/2: EEG results pending. No recent seizure activity reported.       8/3:Patient seen and examined. Discussed plan of care with Dr. Suarez. Patient is awake, alert, oriented x 4, NADN, No SOB noted. Patient follow commands, Patient back to base. Inpatient workup completed. Patient can follow up outpatient  3 days after discharge. No driving.     Patient to follow up with [facility] at [phone] within 3 days from discharge.     Side effects of seizure medications:   Explained to patient/caregiver that side effects of antiepileptics could include life threatening rashes, severe lab abnormalities, kidney stones, mood changes including depression, weight loss or gain, organ failure, suicidal ideation and death. The patient has been prescribed this medication because seizures have serious risks that in many cases outweight the risks. Advised patient/caregiver to be please be aware of these possible side effects and encouraged them to ask questions if needed.  Seizure precautions:     Patient and/caregiver advised that patients having seizures should not to drive or operate heavy machinery until 6 months seizure free. Also explained that patient is to avoid activities that could be high risk during a seizure, including but not limited to swimming alone, climbing to high levels, and  bathing in a tub.                                              Active Diagnoses:    Diagnosis Date Noted POA    PRINCIPAL PROBLEM:  Acute respiratory failure with hypoxia and hypercarbia [J96.01, J96.02] 08/01/2020 Yes    CHF (congestive heart failure) [I50.9] 08/03/2020 Yes    Acute exacerbation of systolic CHF(30-35% EF) [I50.21] 08/01/2020 Yes    Chronic narcotic dependence [F11.20] 08/01/2020 Yes    COPD with acute exacerbation [J44.1] 08/01/2020 Yes    Pulmonary edema with congestive heart failure with reduced left ventricular function [I50.1] 08/01/2020 Yes    Acute pulmonary edema [J81.0]  Unknown    Hyperlipidemia with target low density lipoprotein (LDL) cholesterol less than 100 mg/dL [E78.5] 02/13/2013 Yes    Hypertension [I10]  Yes    Coronary artery disease, shock, CHF, 5 ION stents in LAD, 4/2012 [I25.10] 04/26/2012 Yes      Problems Resolved During this Admission:    Diagnosis Date Noted Date Resolved POA    Respiratory arrest [R09.2] 08/01/2020 08/03/2020 Yes       VTE Risk Mitigation (From admission, onward)         Ordered     enoxaparin injection 40 mg  Every 24 hours      08/01/20 0849     IP VTE HIGH RISK PATIENT  Once      08/01/20 0849     Place sequential compression device  Until discontinued      08/01/20 0849                Clementine Buckner NP  Neurology  [facility]

## 2020-08-03 NOTE — PROGRESS NOTES
[facility]  Department of Cardiology  Consult Note      PATIENT NAME: Kal Dodd  MRN: [id number]  TODAY'S DATE: 08/03/2020  ADMIT DATE: 8/1/2020                          CONSULT REQUESTED BY: Mallkia Tyler MD    SUBJECTIVE       8/3/2020  INTERVAL UPDATE    Patient is extubated and off the ventilator. He is braydcardic. He was on clonidine patch for both blood pressure and withdrawal symptoms, but now he has his suboxone. He is feeling better. He denies chest pain or increase in SOB.             PRINCIPAL PROBLEM: Acute respiratory failure with hypoxia and hypercarbia      REASON FOR CONSULT:  CHF    Interval update 08/02/2020:  Patient had non remained full night appears more alert and responsive remains intubated and feeling simple questions with head not and hand gestures.  Appears anxious to have his ET tube removed.      HPI:    -Chart Reviewed  -Patient is on ventilator, can answer yes and no questions however he is currently sedated, most history obtained from records.    Mr. Dodd is a 60 year old male patient with a PMH significant for history of HTN, CAD, Dyslipidemia, hepatitis, and CHF last seen on ECHO in 2017 showing evidence at that time of a prior transmural MI and LVEF 30-35%. He was brought to the hospital after having fallen to the ground saying he could not breathe and agonal breathing was noted. EMS activated by his friend and patient was ambu bagged the way to the hospital and intubated in the ED. PH on admission 7.1. PCO2 88. Apparently according to the nurse he had a 23 year old daughter die recently in the past year and was positive for marijuana, benzo's and take suboxone. Patient denies however attempt to hurt himself. He answers no when I ask if he had chest pain prior to falling to the ground. He shakes his head yes when I ask if he had been short of breath for the past several days. CXR shows some interstitial pulmonary edema. BNP only 186.         Review of  "patient's allergies indicates:  No Known Allergies    Past Medical History:   Diagnosis Date    Coronary artery disease 4/26/12    "massive heart attack"    Heart attack     Hyperlipidemia     Hypertension      Past Surgical History:   Procedure Laterality Date    CORONARY STENT PLACEMENT  4/12     Social History     Tobacco Use    Smoking status: Current Every Day Smoker     Packs/day: 1.00    Smokeless tobacco: Never Used   Substance Use Topics    Alcohol use: No    Drug use: No     Types: Marijuana        REVIEW OF SYSTEMS  Review of Systems     Constitutional: Negative for chills, fatigue and fever.   Eyes: No double vision, No blurred vision  Neuro: No headaches, No dizziness  Respiratory: Negative for cough, shortness of breath and wheezing.    Cardiovascular: Negative for chest pain. Negative for palpitations and leg swelling.   Gastrointestinal: Negative for abdominal pain, No melena, diarrhea, nausea and vomiting.   Genitourinary: Negative for dysuria and frequency, Negative for hematuria  Skin: Negative for bruising, Negative for edema or discoloration noted.     OBJECTIVE     VITAL SIGNS (Most Recent)  Temp: 97.8 °F (36.6 °C) (08/03/20 0400)  Pulse: (!) 51 (08/03/20 0723)  Resp: 12 (08/03/20 0723)  BP: (!) 121/57 (08/03/20 0700)  SpO2: 98 % (08/03/20 0723)    VENTILATION STATUS  Resp: 12 (08/03/20 0723)  SpO2: 98 % (08/03/20 0723)  Vent Mode: Spont  Oxygen Concentration (%):  [9-40] 9  Resp Rate Total:  [11 br/min-29 br/min] 13 br/min  Vt Set:  [500 mL] 500 mL  PEEP/CPAP:  [5 cmH20] 5 cmH20  Pressure Support:  [10 cmH20] 10 cmH20  Mean Airway Pressure:  [9.2 cmH20-9.6 cmH20] 9.3 cmH20    I & O (Last 24H):    Intake/Output Summary (Last 24 hours) at 8/3/2020 1043  Last data filed at 8/3/2020 0500  Gross per 24 hour   Intake 785.13 ml   Output 1900 ml   Net -1114.87 ml       WEIGHTS  Wt Readings from Last 1 Encounters:   08/01/20 0913 83.3 kg (183 lb 10.3 oz)   08/01/20 0526 86 kg (189 lb 9.5 oz) "       PHYSICAL EXAM  GENERAL: well built, well nourished, well-developed in no apparent distress alert and oriented.    HEENT: Orally Intubated Poor oral dentition and partially edentulous,  NECK: No JVD. No bruit..   THYROID: Thyroid not enlarged. No nodules present..   CARDIAC: Regular rate and rhythm. S1 is normal.S2 is normal.  No gallops, clicks or murmurs noted at this time.  CHEST ANATOMY: normal.   LUNGS: scattered rhonchi  ABDOMEN: Soft no masses or organomegaly.  No abdomen pulsations or bruits.  Normal bowel sounds. No pulsations and no masses felt, No guarding or rebound.   URINARY:  Concentrated urine in the cuevas catheter   EXTREMITIES: No cyanosis, clubbing or edema noted at this time., no calf tenderness bilaterally.   PERIPHERAL VASCULAR SYSTEM: Good palpable distal pulses.   CENTRAL NERVOUS SYSTEM:  Moving all extremities  SKIN: Skin without lesions, moist, well perfused.       HOME MEDICATIONS:  No current facility-administered medications on file prior to encounter.      Current Outpatient Medications on File Prior to Encounter   Medication Sig Dispense Refill    aspirin (ECOTRIN) 81 MG EC tablet Take 81 mg by mouth once daily.        buprenorphine-naloxone (SUBOXONE) 8-2 mg Film Place under the tongue once daily.      fenofibrate 160 MG Tab Take 1 tablet (160 mg total) by mouth once daily. 90 tablet 0    fluticasone propionate (FLONASE) 50 mcg/actuation nasal spray 1 spray (50 mcg total) by Each Nare route once daily. 1 Bottle 2    furosemide (LASIX) 20 MG tablet Take 1 tablet (20 mg total) by mouth 2 (two) times daily. 60 tablet 11    hydrOXYzine pamoate (VISTARIL) 25 MG Cap Take 1 capsule (25 mg total) by mouth 4 (four) times daily. 30 capsule 0    lisinopril (PRINIVIL,ZESTRIL) 40 MG tablet Take 1 tablet (40 mg total) by mouth once daily. (Patient taking differently: Take 40 mg by mouth once daily. Pt taking 1/2 tab once daily) 90 tablet 3    lovastatin (MEVACOR) 20 MG tablet Take 1  tablet by mouth once daily 90 tablet 0    metoprolol succinate (TOPROL-XL) 25 MG 24 hr tablet Take 1 tablet (25 mg total) by mouth once daily. 30 tablet 11    nitroGLYCERIN (NITROSTAT) 0.4 MG SL tablet Place 1 tablet (0.4 mg total) under the tongue every 5 (five) minutes as needed for Chest pain. 30 tablet 1    nystatin (DUKE'S SOLUTION) Take 10 mLs by mouth 4 (four) times daily. 120 mL 0    spironolactone (ALDACTONE) 25 MG tablet Take 1 tablet (25 mg total) by mouth once daily. 30 tablet 11    [DISCONTINUED] DULoxetine (CYMBALTA) 30 MG capsule Take 1 capsule (30 mg total) by mouth once daily. 90 capsule 1       SCHEDULED MEDS:   albuterol-ipratropium  3 mL Nebulization Q6H    aspirin  81 mg Oral Daily    buprenorphine-naloxone  1 tablet Sublingual Daily    cefTRIAXone (ROCEPHIN) IVPB  1 g Intravenous Q12H    chlorhexidine  15 mL Mouth/Throat BID    cloNIDine 0.1 mg/24 hr td ptwk  1 patch Transdermal Q7 Days    enoxaparin  40 mg Subcutaneous Q24H    fluticasone propionate  1 spray Each Nostril Daily    furosemide (LASIX) IV  40 mg Intravenous Q12H    methylPREDNISolone sodium succinate  80 mg Intravenous Q12H    mupirocin   Nasal BID    polyethylene glycol  17 g Oral Daily    senna-docusate 8.6-50 mg  1 tablet Oral BID       CONTINUOUS INFUSIONS:      PRN MEDS:acetaminophen, calcium chloride IVPB, calcium chloride IVPB, calcium chloride IVPB, dextrose 50%, dextrose 50%, glucagon (human recombinant), glucose, glucose, HYDROcodone-acetaminophen, insulin aspart U-100, magnesium oxide, magnesium sulfate IVPB, magnesium sulfate IVPB, magnesium sulfate IVPB, magnesium sulfate IVPB, melatonin, morphine, ondansetron, potassium chloride in water, potassium chloride in water, potassium chloride in water, potassium chloride in water, potassium chloride, potassium chloride, potassium chloride, potassium chloride, promethazine (PHENERGAN) IVPB, sodium chloride 0.9%, sodium phosphate IVPB, sodium phosphate IVPB,  sodium phosphate IVPB, sodium phosphate IVPB, sodium phosphate IVPB    LABS AND DIAGNOSTICS     CBC LAST 3 DAYS  Recent Labs   Lab 08/01/20  0558 08/02/20  0336 08/02/20  0543 08/02/20  1134 08/03/20  0409   WBC 11.77 17.34*  --   --  23.66*   RBC 5.40 5.51  --   --  4.88   HGB 15.6 15.6  --   --  14.3   HCT 49.0 48.1 48 51 43.1   MCV 91 87  --   --  88   MCH 28.9 28.3  --   --  29.3   MCHC 31.8* 32.4  --   --  33.2   RDW 13.2 13.2  --   --  13.2    279  --   --  278   MPV 9.0* 9.6  --   --  9.6   GRAN 31.0* 91.5*  15.9*  --   --  95.0*   LYMPH 67.0* 6.4*  1.1  --   --  3.0*   MONO 0.0* 1.6*  0.3  --   --  0.0*   BASO  --  0.01  --   --   --    NRBC 0 0  --   --  0       COAGULATION LAST 3 DAYS  Recent Labs   Lab 08/01/20 0558   LABPT 13.0   INR 1.0       CHEMISTRY LAST 3 DAYS  Recent Labs   Lab 08/01/20  0558  08/01/20  1935 08/02/20  0336 08/02/20  0543 08/02/20  1134 08/03/20  0409     --   --  139  --   --  136   K 3.6  --   --  3.8  --   --  3.1*     --   --  101  --   --  96   CO2 23  --   --  24  --   --  27   ANIONGAP 14  --   --  14  --   --  13   BUN 17  --   --  23  --   --  35*   CREATININE 1.1  --   --  0.9  --   --  1.1   *  --   --  173*  --   --  234*   CALCIUM 8.6*  --   --  8.8  --   --  8.6*   PH  --    < > 7.386  --  7.387 7.424  --    MG 2.0  --   --  1.9  --   --  2.2   ALBUMIN 4.0  --   --  3.9  --   --  3.9   PROT 7.0  --   --  7.1  --   --  7.2   ALKPHOS 60  --   --  49*  --   --  45*   ALT 68*  --   --  52*  --   --  35   *  --   --  30  --   --  18   BILITOT 0.7  --   --  0.6  --   --  0.7    < > = values in this interval not displayed.       CARDIAC PROFILE LAST 3 DAYS  Recent Labs   Lab 08/01/20  0558 08/01/20  1130 08/01/20  1803   *  --   --    TROPONINI 0.036 0.432* 0.351*       ENDOCRINE LAST 3 DAYS  Recent Labs   Lab 08/01/20  0616   PROCAL <0.05       LAST ARTERIAL BLOOD GAS  ABG  Recent Labs   Lab 08/02/20  1134   PH 7.424   PO2 63*    PCO2 41.9   HCO3 27.5   BE 3       LAST 7 DAYS MICROBIOLOGY   Microbiology Results (last 7 days)     Procedure Component Value Units Date/Time    Culture, Respiratory with Gram Stain [[id number]]  (Abnormal) Collected: 08/01/20 0932    Order Status: Completed Specimen: Respiratory from Sputum Updated: 08/03/20 0649     Respiratory Culture Normal respiratory roshan      ENTEROBACTER CLOACAE  Few  For susceptibility see order #[id number]       Gram Stain (Respiratory) <10 epithelial cells per low power field.     Gram Stain (Respiratory) Few WBC's     Gram Stain (Respiratory) Rare Gram positive cocci    Culture, Respiratory with Gram Stain [[id number]]  (Abnormal)  (Susceptibility) Collected: 08/01/20 0631    Order Status: Completed Specimen: Respiratory from Endotracheal Aspirate Updated: 08/03/20 0647     Respiratory Culture Normal respiratory roshan also present      ENTEROBACTER CLOACAE  Moderate       Gram Stain (Respiratory) <10 epithelial cells per low power field.     Gram Stain (Respiratory) Few WBC's     Gram Stain (Respiratory) Few Gram positive cocci     Gram Stain (Respiratory) Rare Gram negative rods    Blood Culture #1 **CANNOT BE ORDERED STAT** [[id number]] Collected: 08/01/20 0615    Order Status: Completed Specimen: Blood from Peripheral, Forearm, Right Updated: 08/02/20 1432     Blood Culture, Routine No Growth to date      No Growth to date    Blood Culture #2 **CANNOT BE ORDERED STAT** [[id number]] Collected: 08/01/20 0616    Order Status: Completed Specimen: Blood from Peripheral, Upper Arm, Right Updated: 08/02/20 1432     Blood Culture, Routine No Growth to date      No Growth to date          MOST RECENT IMAGING  X-Ray Chest AP Portable  CLINICAL HISTORY:  60 years (1960) Male chf resp arrest    TECHNIQUE:  Portable AP radiograph the chest.    COMPARISON:  Most recent radiograph from August 1, 2020.    FINDINGS:  The lungs are clear, except for some vascular crowding bilaterally,  likely  related to a suboptimal inspiration.  Costophrenic angles are  seen without effusion. No pneumothorax is identified. The heart is  mildly enlarged. The mediastinum is within normal limits. Osseous  structures appear within normal limits. The visualized upper abdomen  is unremarkable.    Lines and tubes: Endotracheal tube with tip approximately 3 cm from  the tawanda. There is an enteric tube with tip at the GE junction.    IMPRESSION:  Mildly improved central hilar predominant interstitial opacities  suggesting improving pulmonary edema.    .    Electronically Signed by RAHUL Wrigth on 8/2/2020 6:21 AM      ECHOCARDIOGRAM RESULTS (last 5)  No results found for this or any previous visit.    CURRENT/PREVIOUS VISIT EKG  Results for orders placed or performed during the hospital encounter of 08/01/20   EKG 12-lead    Collection Time: 08/01/20  6:47 PM    Narrative    Test Reason : R06.02,    Vent. Rate : 068 BPM     Atrial Rate : 068 BPM     P-R Int : 154 ms          QRS Dur : 114 ms      QT Int : 444 ms       P-R-T Axes : 059 086 184 degrees     QTc Int : 472 ms    Normal sinus rhythm  Possible Inferior infarct ,age undetermined  Anteroseptal infarct (cited on or before 18-MAY-2017)  ST and T wave abnormality, consider lateral ischemia  Abnormal ECG  When compared with ECG of 01-AUG-2020 06:29,  No significant change was found  Confirmed by Lazaro Perez MD ([id number]) on 8/2/2020 2:54:57 PM    Referred By: DORITA ERIC           Confirmed By:Lazaro Perez MD           ASSESSMENT/PLAN:     Active Hospital Problems    Diagnosis    *Acute respiratory failure with hypoxia and hypercarbia    Acute exacerbation of systolic CHF(30-35% EF)    Chronic narcotic dependence    COPD with acute exacerbation    Pulmonary edema with congestive heart failure with reduced left ventricular function    Acute pulmonary edema    Hyperlipidemia with target low density lipoprotein (LDL) cholesterol less than 100 mg/dL     "Hypertension    Coronary artery disease, shock, CHF, 5 ION stents in LAD, 4/2012     "massive heart attack"     Conclusion    · Concentric left ventricular hypertrophy.  · Mild left ventricular enlargement.  · Mildly decreased left ventricular systolic function. The estimated ejection fraction is 40%.  · Grade I (mild) left ventricular diastolic dysfunction consistent with impaired relaxation.  · Normal right ventricular systolic function.  · Mild left atrial enlargement.  · Local segmental wall motion abnormalities.  · Normal central venous pressure (3 mmHg).         ASSESSMENT & PLAN:     1. Acute Respiratory Failure with Hypoxia and Hypercapnia- IMPROVED  2. Acute on Chronic CHF with reduced ejection fraction- HF-r-EF  3. CAD S/P MI in 2012  4. Transaminitis-currently stable  5. Vitamin D Deficiency  6. H/O LVEF echo shows some improvement in EF about 40%.  7. Sinus Bradycardia    RECOMMENDATIONS:    Ok to DC clonidine as patient is extubated and has suboxone  Continue Metoprolol XL 25 Daily  Increase Lisinopril to 40 mg daily  Change to PO Diuretics tomorrow   Ok to move to cardiology floor  Will follow    Ni Diggs NP  [facility]  Department of Cardiology  Date of Service: 08/03/2020  9:38 AM    I have personally interviewed and examined the patient, informed t the patient's brother at bedside of the clinical condition.  "

## 2020-08-03 NOTE — CARE UPDATE
08/02/20 2024   Patient Assessment/Suction   Level of Consciousness (AVPU) alert   Respiratory Effort Unlabored   Expansion/Accessory Muscles/Retractions no use of accessory muscles   All Lung Fields Breath Sounds clear;coarse   Rhythm/Pattern, Respiratory no shortness of breath reported   Cough Frequency infrequent   Cough Type no productive sputum   PRE-TX-O2   O2 Device (Oxygen Therapy) High Flow nasal Cannula   $ Is the patient on Low Flow Oxygen? Yes   Flow (L/min) 9   SpO2 95 %   Pulse Oximetry Type Continuous   $ Pulse Oximetry - Multiple Charge Pulse Oximetry - Multiple   Pulse 82   Resp 19   Positioning   Head of Bed (HOB) HOB at 90 degrees   Aerosol Therapy   $ Aerosol Therapy Charges Aerosol Treatment   Daily Review of Necessity (SVN) completed   Respiratory Treatment Status (SVN) given   Treatment Route (SVN) mouthpiece   Patient Position (SVN) HOB elevated   Post Treatment Assessment (SVN) breath sounds improved   Signs of Intolerance (SVN) none   Breath Sounds Post-Respiratory Treatment   Throughout All Fields Post-Treatment All Fields   Throughout All Fields Post-Treatment no change   Post-treatment Heart Rate (beats/min) 75   Post-treatment Resp Rate (breaths/min) 18   Respiratory Evaluation   $ Care Plan Tech Time 15 min

## 2020-08-03 NOTE — PROCEDURES
REASON FOR STUDY: Altered mental status.  DATE OF STUDY: 8/1/2020  PHYSICIAN REQUESTING/INSTITUTION: Dr. Tyler.    AED: Keppra and propofol.    METHODS:  EEG was done according to the 10/20 international system.  This is a 30 min routine EEG. Bipolar and referential montages were used. Video recording was used during the study.      CLINICAL HISTORY:  60 year old with possible seizures.       FINDINGS:  EEG overall symmetric with continuous polymorphic waveforms.       1- Generalized theta activity  2- Absent PDR  3- Diffuse beta activity, likely due to benzodiazepines       Sleep: None.         Epileptiform discharges: None     Activation procedures:  Photic stimulation was performed with no abnormalities seen.     ECG: No clear irregular rhythm.     IMPRESSION: Abnormal indicating mild to moderate encephalopathy. No ictal discharges.

## 2020-08-03 NOTE — PROGRESS NOTES
[facility]  Pulmonology  Progress Note    Subjective     8/3/2020:  No major issues overnight.  Subjectively improved over the past 24 hours.  Still with some wheezing and shortness of breath.  No new complaints.     Review of Systems   Constitutional: Negative for fever, chills and night sweats.   Respiratory: Positive for cough, shortness of breath, wheezing and dyspnea on extertion. Negative for sputum production.    Cardiovascular: Negative for chest pain, palpitations and leg swelling.   Gastrointestinal: Negative for nausea, abdominal pain and abdominal distention.      I have personally reviewed the following during today's evaluation:  past medical history, ROS, family history, social history, surgical history, current inpatient medications,drug allergies, vital signs over the past 24 hours, results of relevant diagnostic studies and nursing/provider documentation from the past 24 hours.     Objective     VS Temp:  [97.6 °F (36.4 °C)-98 °F (36.7 °C)]   Pulse:  [48-82]   Resp:  [10-36]   BP: ()/(54-74)   SpO2:  [90 %-100 %]   Ideal body weight: 63.8 kg (140 lb 10.5 oz)  Adjusted ideal body weight: 71.6 kg (157 lb 13.6 oz)   I/O   Intake/Output Summary (Last 24 hours) at 8/3/2020 1837  Last data filed at 8/3/2020 0500  Gross per 24 hour   Intake 640 ml   Output 1000 ml   Net -360 ml        Vent SpO2 95% on 4L NC   PE Physical Exam   Constitutional: He is oriented to person, place, and time. He appears well-developed and well-nourished.  Non-toxic appearance. No distress. Nasal cannula in place.   HENT:   Head: Normocephalic and atraumatic.   Mouth/Throat: Uvula is midline, oropharynx is clear and moist and mucous membranes are normal. Mallampati Score: II.   Neck: Trachea normal and normal range of motion. Neck supple. No thyromegaly present.   Cardiovascular: Normal rate, regular rhythm, normal heart sounds and intact distal pulses.   Pulmonary/Chest: Normal expansion, symmetric chest  wall expansion and effort normal. He has wheezes. He has no rhonchi. He has no rales.   Abdominal: Soft. Bowel sounds are normal. He exhibits no distension. There is no abdominal tenderness.   Musculoskeletal: Normal range of motion.         General: No tenderness, deformity or edema.   Lymphadenopathy: No supraclavicular adenopathy is present.     He has no cervical adenopathy.     He has no axillary adenopathy.   Neurological: He is alert and oriented to person, place, and time. He has normal strength. No cranial nerve deficit or sensory deficit. GCS eye subscore is 4. GCS verbal subscore is 5. GCS motor subscore is 6.   Skin: Skin is warm, dry and intact. No rash noted.   Psychiatric: He has a normal mood and affect.   Nursing note and vitals reviewed.        Labs I have personally reviewed and interpreted all labs / diagnostic studies obtained over the past 24 hours, and relevant results are as follows:  Recent Labs   Lab 08/03/20  0409   WBC 23.66*   RBC 4.88   HGB 14.3   HCT 43.1      MCV 88   MCH 29.3   MCHC 33.2      K 3.1*   CL 96   CO2 27   BUN 35*   CREATININE 1.1   MG 2.2   ALT 35   AST 18   ALKPHOS 45*   BILITOT 0.7   PROT 7.2   ALBUMIN 3.9      Imaging I have personally reviewed and interpreted the following images and reviewed the associated Radiology report.  I have reviewed and interpreted all pertinent imaging results/findings within the past 24 hours.  CXR  8/2/2020:  Mildly improved central hilar predominant interstitial opacities suggesting improving pulmonary edema.     Micro I have personally reviewed and interpreted the available culture data.  Relevant results are as follows.  Blood Culture   Lab Results   Component Value Date    LABBLOO No Growth to date 08/01/2020    LABBLOO No Growth to date 08/01/2020    LABBLOO No Growth to date 08/01/2020   , Sputum Culture   Lab Results   Component Value Date    GSRESP <10 epithelial cells per low power field. 08/01/2020    GSRESP Few  WBC's 08/01/2020    GSRESP Rare Gram positive cocci 08/01/2020    RESPIRATORYC Normal respiratory roshan 08/01/2020    RESPIRATORYC (A) 08/01/2020     ENTEROBACTER CLOACAE  Few  For susceptibility see order #[id number]      and Urine Culture  No results found for: LABURIN   Medications Scheduled    albuterol-ipratropium  3 mL Nebulization Q6H    aspirin  81 mg Oral Daily    buprenorphine-naloxone  1 tablet Sublingual Daily    cephALEXin  1,000 mg Oral Q8H    enoxaparin  40 mg Subcutaneous Q24H    fluticasone propionate  1 spray Each Nostril Daily    furosemide (LASIX) IV  40 mg Intravenous Q12H    lisinopriL  40 mg Oral Daily    metoprolol succinate  25 mg Oral Daily    polyethylene glycol  17 g Oral Daily    predniSONE  40 mg Oral QHS    senna-docusate 8.6-50 mg  1 tablet Oral BID      Continuous Infusions:      PRN   acetaminophen, calcium chloride IVPB, calcium chloride IVPB, calcium chloride IVPB, dextrose 50%, dextrose 50%, glucagon (human recombinant), glucose, glucose, HYDROcodone-acetaminophen, insulin aspart U-100, magnesium oxide, magnesium sulfate IVPB, magnesium sulfate IVPB, magnesium sulfate IVPB, magnesium sulfate IVPB, melatonin, morphine, ondansetron, potassium chloride in water, potassium chloride in water, potassium chloride in water, potassium chloride in water, potassium chloride, potassium chloride, potassium chloride, potassium chloride, promethazine (PHENERGAN) IVPB, sodium chloride 0.9%, sodium phosphate IVPB, sodium phosphate IVPB, sodium phosphate IVPB, sodium phosphate IVPB, sodium phosphate IVPB        Assessment       Active Hospital Problems    Diagnosis    *Acute respiratory failure with hypoxia and hypercarbia    CHF (congestive heart failure)    Acute exacerbation of systolic CHF(30-35% EF)    Chronic narcotic dependence    COPD with acute exacerbation    Pulmonary edema with congestive heart failure with reduced left ventricular function    Acute pulmonary edema  "   Hyperlipidemia with target low density lipoprotein (LDL) cholesterol less than 100 mg/dL    Hypertension    Coronary artery disease, shock, CHF, 5 ION stents in LAD, 4/2012     "massive heart attack"        My Impression:  Improving AEoCOPD/Aspiration pneumonia.    Plan     Pulmonary  · Transition to prednisone to complete 5 days total.  · Frequent aerosolized BDs.  · 7-10 days of antibiotics for PNA.  · Titrate supplemental oxygen to maintain SpO2 > 90%.  · Stable to transfer out of the ICU.     Benjamin Palma MD  Pulmonary / Critical Care Medicine  [facility]    "

## 2020-08-03 NOTE — ASSESSMENT & PLAN NOTE
· Transition to prednisone to complete 5 days total.  · Frequent aerosolized BDs.  · 7-10 days of antibiotics for PNA.  · Titrate supplemental oxygen to maintain SpO2 > 90%.  · Stable to transfer out of the ICU.

## 2020-08-03 NOTE — NURSING
Report rec'd from Gutierrez in CICU.  Patient arrived to unit via bed on cardiac monitor with O2 4L nc.  Home medications put in pyxis.

## 2020-08-03 NOTE — PROGRESS NOTES
[facility] Medicine  Progress Note    DOS: 08/03/2020    Patient name: Kal Dodd  MRN: [id number]  Admit Date: 8/1/2020   LOS: 2 days     SUBJECTIVE:     Principal problem: Respiratory arrest    Interval History:  Patient was seen and examined bedside.  He was extubated yesterday p.m..  Currently breathing comfortably on 4 L oxygen and is having conversation without any respiratory distress.  Diuresing well on current regimen.  Yesterday his blood pressure was high and cardiology started him on clonidine patch.  Heart rate is running on lower side today.  No acute events overnight as per nursing staff.     Scheduled Meds:   albuterol-ipratropium  3 mL Nebulization Q6H    aspirin  81 mg Oral Daily    buprenorphine-naloxone  1 tablet Sublingual Daily    cefTRIAXone (ROCEPHIN) IVPB  1 g Intravenous Q12H    chlorhexidine  15 mL Mouth/Throat BID    cloNIDine 0.1 mg/24 hr td ptwk  1 patch Transdermal Q7 Days    enoxaparin  40 mg Subcutaneous Q24H    fluticasone propionate  1 spray Each Nostril Daily    furosemide (LASIX) IV  40 mg Intravenous Q12H    methylPREDNISolone sodium succinate  80 mg Intravenous Q12H    mupirocin   Nasal BID    polyethylene glycol  17 g Oral Daily    senna-docusate 8.6-50 mg  1 tablet Oral BID     Continuous Infusions:   nitroGLYCERIN 10 mcg/min (08/02/20 1501)     PRN Meds:acetaminophen, calcium chloride IVPB, calcium chloride IVPB, calcium chloride IVPB, dextrose 50%, dextrose 50%, glucagon (human recombinant), glucose, glucose, HYDROcodone-acetaminophen, insulin aspart U-100, magnesium oxide, magnesium sulfate IVPB, magnesium sulfate IVPB, magnesium sulfate IVPB, magnesium sulfate IVPB, melatonin, morphine, ondansetron, potassium chloride in water, potassium chloride in water, potassium chloride in water, potassium chloride in water, potassium chloride, potassium chloride, potassium chloride, potassium chloride, promethazine (PHENERGAN) IVPB, sodium  chloride 0.9%, sodium phosphate IVPB, sodium phosphate IVPB, sodium phosphate IVPB, sodium phosphate IVPB, sodium phosphate IVPB    Review of patient's allergies indicates:  No Known Allergies    Review of Systems: As per interval history    OBJECTIVE:     Vital Signs (Most Recent)  Temp: 97.8 °F (36.6 °C) (08/03/20 0400)  Pulse: (!) 51 (08/03/20 0723)  Resp: 12 (08/03/20 0723)  BP: (!) 121/57 (08/03/20 0700)  SpO2: 98 % (08/03/20 0723)    Vital Signs Range (Last 24H):  Temp:  [97.6 °F (36.4 °C)-98 °F (36.7 °C)]   Pulse:  [48-90]   Resp:  [10-32]   BP: (109-198)/(55-88)   SpO2:  [90 %-100 %]     I & O (Last 24H):    Intake/Output Summary (Last 24 hours) at 8/3/2020 1013  Last data filed at 8/3/2020 0500  Gross per 24 hour   Intake 785.13 ml   Output 1900 ml   Net -1114.87 ml       Physical Exam:  General:  Poor hygiene, Patient resting comfortably in no acute distress. Appears as stated age. Calm, extubated now  Eyes: No conjunctival injection. No scleral icterus.  ENT: Hearing grossly intact. No discharge from ears. No nasal discharge.   Neck: Supple, trachea midline. No JVD  CVS: RRR. No LE edema BL  Lungs:  Breathing comfortably, on 4 L oxygen  Basal crackles bilaterally, no wheezing or crackles. Good breath sounds. No accessory muscle use. No acute respiratory distress  Abdomen:  Soft, nontender and nondistended.  No organomegaly  Neuro: AOx3. Moves all extremities. Follows commands while intubated  Skin:  No rash or erythema noted    Laboratory:  CBC:   Recent Labs   Lab 08/03/20  0409   WBC 23.66*   RBC 4.88   HGB 14.3   HCT 43.1      MCV 88   MCH 29.3   MCHC 33.2     BMP:   Recent Labs   Lab 08/03/20  0409   *      K 3.1*   CL 96   CO2 27   BUN 35*   CREATININE 1.1   CALCIUM 8.6*   MG 2.2       Diagnostic Results:  Reviewed imaging    ASSESSMENT/PLAN:     60-year-old gentleman with past medical history of systolic CHF(30-35% EF), CAD status post stenting in 2012, COPD, heavy tobacco  "dependence, opiate dependent came after encountering respiratory arrest at home was intubated in ED now extubated breathing comfortably on 4 L oxygen.    Active Hospital Problems    Diagnosis  POA    *Respiratory arrest [R09.2]  Yes     Priority: 1 - High    Acute respiratory failure with hypoxia and hypercarbia [J96.01, J96.02]  Yes     Priority: 2     Acute exacerbation of systolic CHF(30-35% EF) [I50.21]  Yes     Priority: 3     Pulmonary edema with congestive heart failure with reduced left ventricular function [I50.1]  Yes     Priority: 4     Coronary artery disease, shock, CHF, 5 ION stents in LAD, 4/2012 [I25.10]  Yes     Priority: 5      "massive heart attack"      COPD with acute exacerbation [J44.1]  Yes     Priority: 6     Chronic narcotic dependence [F11.20]  Yes    Acute pulmonary edema [J81.0]  Unknown    Hyperlipidemia with target low density lipoprotein (LDL) cholesterol less than 100 mg/dL [E78.5]  Yes    Hypertension [I10]  Yes      Resolved Hospital Problems   No resolved problems to display.         Plan:   Acute hypoxic and hypercapnic respiratory failure likely due to systolic CHF exacerbation and some component of COPD exacerbation.     Patient was extubated yesterday p.m. however still require significant oxygen    Transfer to cardiology B     IV diuresis with aggressive electrolyte replacement, daily weight, accurate charting of urine output.  Might transition to p.o. Lasix tomorrow    Remove Marie    Pulmonary started Rocephin due to Enterobacter in sputum.  Of note procalcitonin was negative.  Can be deescalated to Keflex    Serial EKGs, telemetry monitoring, cardiology consult.      Bronchodilators, supplemental oxygen, low-dose steroid     Hold lisinopril and Aldactone in light of aggressive IV diuresis    Resume beta-blocker and lisinopril.  If Cardiology is okay we can discontinue clonidine and maximize beta-blockade and ACEI use     Basal bolus insulin regimen, " Accuchecks     Neurology was consulted by ED physician due to some rhythmic tonic clonic movements however that could be metabolic due to hypoxia.  CT head did not show any acute pathology.  Received 1 dose of Keppra     Further management as per clinical course and consultant's recommendations     VTE Risk Mitigation (From admission, onward)         Ordered     enoxaparin injection 40 mg  Every 24 hours      08/01/20 0849     IP VTE HIGH RISK PATIENT  Once      08/01/20 0849     Place sequential compression device  Until discontinued      08/01/20 0849                  Department Hospital Medicine  [facility]  Mallika Tyler MD  Date of service: 08/03/2020

## 2020-08-03 NOTE — PLAN OF CARE
This note also relates to the following rows which could not be included:  SpO2 - Cannot attach notes to unvalidated device data  Pulse - Cannot attach notes to unvalidated device data  Resp - Cannot attach notes to unvalidated device data       08/03/20 0723   PRE-TX-O2   O2 Device (Oxygen Therapy) High Flow nasal Cannula   Flow (L/min) 5   Aerosol Therapy   $ Aerosol Therapy Charges Aerosol Treatment   Daily Review of Necessity (SVN) completed   Respiratory Treatment Status (SVN) given   Treatment Route (SVN) mouthpiece   Patient Position (SVN) semi-Nascimento's   Post Treatment Assessment (SVN) increased aeration;breath sounds improved   Signs of Intolerance (SVN) none   Breath Sounds Post-Respiratory Treatment   Throughout All Fields Post-Treatment All Fields   Throughout All Fields Post-Treatment aeration increased   Post-treatment Heart Rate (beats/min) 51   Post-treatment Resp Rate (breaths/min) 18   continue tx

## 2020-08-04 PROBLEM — I50.1 PULMONARY EDEMA WITH CONGESTIVE HEART FAILURE WITH REDUCED LEFT VENTRICULAR FUNCTION: Status: RESOLVED | Noted: 2020-08-01 | Resolved: 2020-08-04

## 2020-08-04 LAB
ALBUMIN SERPL BCP-MCNC: 3.7 G/DL (ref 3.5–5.2)
ALP SERPL-CCNC: 39 U/L (ref 55–135)
ALT SERPL W/O P-5'-P-CCNC: 28 U/L (ref 10–44)
ANION GAP SERPL CALC-SCNC: 11 MMOL/L (ref 8–16)
AST SERPL-CCNC: 15 U/L (ref 10–40)
BASOPHILS # BLD AUTO: 0.02 K/UL (ref 0–0.2)
BASOPHILS NFR BLD: 0.1 % (ref 0–1.9)
BILIRUB SERPL-MCNC: 0.3 MG/DL (ref 0.1–1)
BUN SERPL-MCNC: 40 MG/DL (ref 8–23)
CALCIUM SERPL-MCNC: 9.2 MG/DL (ref 8.7–10.5)
CHLORIDE SERPL-SCNC: 98 MMOL/L (ref 95–110)
CO2 SERPL-SCNC: 30 MMOL/L (ref 23–29)
CREAT SERPL-MCNC: 0.8 MG/DL (ref 0.5–1.4)
DIFFERENTIAL METHOD: ABNORMAL
EOSINOPHIL # BLD AUTO: 0 K/UL (ref 0–0.5)
EOSINOPHIL NFR BLD: 0 % (ref 0–8)
ERYTHROCYTE [DISTWIDTH] IN BLOOD BY AUTOMATED COUNT: 13.4 % (ref 11.5–14.5)
EST. GFR  (AFRICAN AMERICAN): >60 ML/MIN/1.73 M^2
EST. GFR  (NON AFRICAN AMERICAN): >60 ML/MIN/1.73 M^2
GLUCOSE SERPL-MCNC: 115 MG/DL (ref 70–110)
GLUCOSE SERPL-MCNC: 210 MG/DL (ref 70–110)
GLUCOSE SERPL-MCNC: 237 MG/DL (ref 70–110)
GLUCOSE SERPL-MCNC: 242 MG/DL (ref 70–110)
GLUCOSE SERPL-MCNC: 282 MG/DL (ref 70–110)
HCT VFR BLD AUTO: 40.9 % (ref 40–54)
HGB BLD-MCNC: 13.5 G/DL (ref 14–18)
IMM GRANULOCYTES # BLD AUTO: 0.17 K/UL (ref 0–0.04)
IMM GRANULOCYTES NFR BLD AUTO: 0.8 % (ref 0–0.5)
LYMPHOCYTES # BLD AUTO: 1.1 K/UL (ref 1–4.8)
LYMPHOCYTES NFR BLD: 5.2 % (ref 18–48)
MAGNESIUM SERPL-MCNC: 2.2 MG/DL (ref 1.6–2.6)
MCH RBC QN AUTO: 28.9 PG (ref 27–31)
MCHC RBC AUTO-ENTMCNC: 33 G/DL (ref 32–36)
MCV RBC AUTO: 88 FL (ref 82–98)
MONOCYTES # BLD AUTO: 0.9 K/UL (ref 0.3–1)
MONOCYTES NFR BLD: 3.9 % (ref 4–15)
NEUTROPHILS # BLD AUTO: 19.9 K/UL (ref 1.8–7.7)
NEUTROPHILS NFR BLD: 90 % (ref 38–73)
NRBC BLD-RTO: 0 /100 WBC
PHOSPHATE SERPL-MCNC: 3.1 MG/DL (ref 2.7–4.5)
PLATELET # BLD AUTO: 284 K/UL (ref 150–350)
PMV BLD AUTO: 9.4 FL (ref 9.2–12.9)
POTASSIUM SERPL-SCNC: 3.9 MMOL/L (ref 3.5–5.1)
PROT SERPL-MCNC: 6.6 G/DL (ref 6–8.4)
RBC # BLD AUTO: 4.67 M/UL (ref 4.6–6.2)
SODIUM SERPL-SCNC: 139 MMOL/L (ref 136–145)
WBC # BLD AUTO: 22.09 K/UL (ref 3.9–12.7)

## 2020-08-04 PROCEDURE — 21400001 HC TELEMETRY ROOM

## 2020-08-04 PROCEDURE — 85025 COMPLETE CBC W/AUTO DIFF WBC: CPT

## 2020-08-04 PROCEDURE — 25000003 PHARM REV CODE 250: Performed by: NURSE PRACTITIONER

## 2020-08-04 PROCEDURE — 63600175 PHARM REV CODE 636 W HCPCS: Performed by: INTERNAL MEDICINE

## 2020-08-04 PROCEDURE — 99233 SBSQ HOSP IP/OBS HIGH 50: CPT | Mod: ,,, | Performed by: INTERNAL MEDICINE

## 2020-08-04 PROCEDURE — 94640 AIRWAY INHALATION TREATMENT: CPT

## 2020-08-04 PROCEDURE — 84100 ASSAY OF PHOSPHORUS: CPT

## 2020-08-04 PROCEDURE — 25000003 PHARM REV CODE 250: Performed by: HOSPITALIST

## 2020-08-04 PROCEDURE — 99900035 HC TECH TIME PER 15 MIN (STAT)

## 2020-08-04 PROCEDURE — 94761 N-INVAS EAR/PLS OXIMETRY MLT: CPT

## 2020-08-04 PROCEDURE — 27000221 HC OXYGEN, UP TO 24 HOURS

## 2020-08-04 PROCEDURE — 63600175 PHARM REV CODE 636 W HCPCS: Performed by: HOSPITALIST

## 2020-08-04 PROCEDURE — 83735 ASSAY OF MAGNESIUM: CPT

## 2020-08-04 PROCEDURE — 80053 COMPREHEN METABOLIC PANEL: CPT

## 2020-08-04 PROCEDURE — 25000242 PHARM REV CODE 250 ALT 637 W/ HCPCS: Performed by: HOSPITALIST

## 2020-08-04 PROCEDURE — 99233 PR SUBSEQUENT HOSPITAL CARE,LEVL III: ICD-10-PCS | Mod: ,,, | Performed by: INTERNAL MEDICINE

## 2020-08-04 PROCEDURE — 36415 COLL VENOUS BLD VENIPUNCTURE: CPT

## 2020-08-04 PROCEDURE — 25000003 PHARM REV CODE 250: Performed by: INTERNAL MEDICINE

## 2020-08-04 RX ORDER — LORAZEPAM 1 MG/1
1 TABLET ORAL ONCE
Status: COMPLETED | OUTPATIENT
Start: 2020-08-04 | End: 2020-08-04

## 2020-08-04 RX ORDER — IPRATROPIUM BROMIDE AND ALBUTEROL SULFATE 2.5; .5 MG/3ML; MG/3ML
3 SOLUTION RESPIRATORY (INHALATION) 3 TIMES DAILY
Status: DISCONTINUED | OUTPATIENT
Start: 2020-08-04 | End: 2020-08-05 | Stop reason: HOSPADM

## 2020-08-04 RX ORDER — ALBUTEROL SULFATE 0.83 MG/ML
2.5 SOLUTION RESPIRATORY (INHALATION) EVERY 4 HOURS PRN
Status: DISCONTINUED | OUTPATIENT
Start: 2020-08-04 | End: 2020-08-05 | Stop reason: HOSPADM

## 2020-08-04 RX ADMIN — ACETAMINOPHEN 650 MG: 325 TABLET, FILM COATED ORAL at 08:08

## 2020-08-04 RX ADMIN — FUROSEMIDE 40 MG: 10 INJECTION, SOLUTION INTRAMUSCULAR; INTRAVENOUS at 09:08

## 2020-08-04 RX ADMIN — BUPRENORPHINE AND NALOXONE 1 EACH: 8; 2 FILM, SOLUBLE BUCCAL; SUBLINGUAL at 09:08

## 2020-08-04 RX ADMIN — IPRATROPIUM BROMIDE AND ALBUTEROL SULFATE 3 ML: .5; 3 SOLUTION RESPIRATORY (INHALATION) at 07:08

## 2020-08-04 RX ADMIN — SENNOSIDES AND DOCUSATE SODIUM 1 TABLET: 8.6; 5 TABLET ORAL at 09:08

## 2020-08-04 RX ADMIN — CEPHALEXIN 1000 MG: 250 CAPSULE ORAL at 09:08

## 2020-08-04 RX ADMIN — LORAZEPAM 1 MG: 1 TABLET ORAL at 09:08

## 2020-08-04 RX ADMIN — LISINOPRIL 40 MG: 20 TABLET ORAL at 09:08

## 2020-08-04 RX ADMIN — INSULIN ASPART 6 UNITS: 100 INJECTION, SOLUTION INTRAVENOUS; SUBCUTANEOUS at 09:08

## 2020-08-04 RX ADMIN — ASPIRIN 81 MG: 81 TABLET, DELAYED RELEASE ORAL at 09:08

## 2020-08-04 RX ADMIN — IPRATROPIUM BROMIDE AND ALBUTEROL SULFATE 3 ML: .5; 3 SOLUTION RESPIRATORY (INHALATION) at 01:08

## 2020-08-04 RX ADMIN — PREDNISONE 40 MG: 20 TABLET ORAL at 09:08

## 2020-08-04 RX ADMIN — CEPHALEXIN 1000 MG: 250 CAPSULE ORAL at 03:08

## 2020-08-04 RX ADMIN — CEPHALEXIN 1000 MG: 250 CAPSULE ORAL at 05:08

## 2020-08-04 RX ADMIN — ENOXAPARIN SODIUM 40 MG: 100 INJECTION SUBCUTANEOUS at 04:08

## 2020-08-04 RX ADMIN — POLYETHYLENE GLYCOL 3350 17 G: 17 POWDER, FOR SOLUTION ORAL at 09:08

## 2020-08-04 NOTE — PROGRESS NOTES
Replaced by [facility]  Department of Cardiology  Consult Note      PATIENT NAME: Kal Dodd  MRN: [id number]  TODAY'S DATE: 08/04/2020  ADMIT DATE: 8/1/2020                          CONSULT REQUESTED BY: Marin Suero MD    SUBJECTIVE     8/4/2020 INTERVAL HISTORY    Patient is on Cardiology floor. He is doing well. He however remains bradycardic and betablocker was held this morning.  He denies chest pain. He is on 2-3 liters of oxygen currently.           8/3/2020  INTERVAL UPDATE    Patient is extubated and off the ventilator. He is braydcardic. He was on clonidine patch for both blood pressure and withdrawal symptoms, but now he has his suboxone. He is feeling better. He denies chest pain or increase in SOB.             PRINCIPAL PROBLEM: Acute respiratory failure with hypoxia and hypercarbia      REASON FOR CONSULT:  CHF    Interval update 08/02/2020:  Patient had non remained full night appears more alert and responsive remains intubated and feeling simple questions with head not and hand gestures.  Appears anxious to have his ET tube removed.      HPI:    -Chart Reviewed  -Patient is on ventilator, can answer yes and no questions however he is currently sedated, most history obtained from records.    Mr. Dodd is a 60 year old male patient with a PMH significant for history of HTN, CAD, Dyslipidemia, hepatitis, and CHF last seen on ECHO in 2017 showing evidence at that time of a prior transmural MI and LVEF 30-35%. He was brought to the hospital after having fallen to the ground saying he could not breathe and agonal breathing was noted. EMS activated by his friend and patient was ambu bagged the way to the hospital and intubated in the ED. PH on admission 7.1. PCO2 88. Apparently according to the nurse he had a 23 year old daughter die recently in the past year and was positive for marijuana, benzo's and take suboxone. Patient denies however attempt to hurt himself. He answers no when I ask if  "he had chest pain prior to falling to the ground. He shakes his head yes when I ask if he had been short of breath for the past several days. CXR shows some interstitial pulmonary edema. BNP only 186.         Review of patient's allergies indicates:  No Known Allergies    Past Medical History:   Diagnosis Date    Coronary artery disease 4/26/12    "massive heart attack"    Heart attack     Hyperlipidemia     Hypertension      Past Surgical History:   Procedure Laterality Date    CORONARY STENT PLACEMENT  4/12     Social History     Tobacco Use    Smoking status: Current Every Day Smoker     Packs/day: 1.00    Smokeless tobacco: Never Used   Substance Use Topics    Alcohol use: No    Drug use: No     Types: Marijuana        REVIEW OF SYSTEMS  Review of Systems     Constitutional: Negative for chills, fatigue and fever.   Eyes: No double vision, No blurred vision  Neuro: No headaches, No dizziness  Respiratory: Negative for cough, shortness of breath and wheezing.    Cardiovascular: Negative for chest pain. Negative for palpitations and leg swelling.   Gastrointestinal: Negative for abdominal pain, No melena, diarrhea, nausea and vomiting.   Genitourinary: Negative for dysuria and frequency, Negative for hematuria  Skin: Negative for bruising, Negative for edema or discoloration noted.     OBJECTIVE     VITAL SIGNS (Most Recent)  Temp: 97.8 °F (36.6 °C) (08/04/20 0725)  Pulse: (!) 45 (08/04/20 0725)  Resp: 18 (08/04/20 0725)  BP: (!) 111/58 (08/04/20 0725)  SpO2: (!) 94 % (08/04/20 0725)    VENTILATION STATUS  Resp: 18 (08/04/20 0725)  SpO2: (!) 94 % (08/04/20 0725)       I & O (Last 24H):    Intake/Output Summary (Last 24 hours) at 8/4/2020 1021  Last data filed at 8/4/2020 0915  Gross per 24 hour   Intake 120 ml   Output --   Net 120 ml       WEIGHTS  Wt Readings from Last 1 Encounters:   08/01/20 0913 83.3 kg (183 lb 10.3 oz)   08/01/20 0526 86 kg (189 lb 9.5 oz)       PHYSICAL EXAM  GENERAL: well built, " well nourished, well-developed in no apparent distress alert and oriented.    HEENT: Orally Intubated Poor oral dentition and partially edentulous,  NECK: No JVD. No bruit..   THYROID: Thyroid not enlarged. No nodules present..   CARDIAC: Regular rate and rhythm. S1 is normal.S2 is normal.  No gallops, clicks or murmurs noted at this time.  CHEST ANATOMY: normal.   LUNGS: scattered rhonchi  ABDOMEN: Soft no masses or organomegaly.  No abdomen pulsations or bruits.  Normal bowel sounds. No pulsations and no masses felt, No guarding or rebound.   URINARY:  No cuevas catheter  EXTREMITIES: No cyanosis, clubbing or edema noted at this time., no calf tenderness bilaterally.   PERIPHERAL VASCULAR SYSTEM: Good palpable distal pulses.   CENTRAL NERVOUS SYSTEM:  Moving all extremities  SKIN: Skin without lesions, moist, well perfused.       HOME MEDICATIONS:  No current facility-administered medications on file prior to encounter.      Current Outpatient Medications on File Prior to Encounter   Medication Sig Dispense Refill    aspirin (ECOTRIN) 81 MG EC tablet Take 81 mg by mouth once daily.        buprenorphine-naloxone (SUBOXONE) 8-2 mg Film Place under the tongue once daily.      fenofibrate 160 MG Tab Take 1 tablet (160 mg total) by mouth once daily. 90 tablet 0    fluticasone propionate (FLONASE) 50 mcg/actuation nasal spray 1 spray (50 mcg total) by Each Nare route once daily. 1 Bottle 2    furosemide (LASIX) 20 MG tablet Take 1 tablet (20 mg total) by mouth 2 (two) times daily. 60 tablet 11    hydrOXYzine pamoate (VISTARIL) 25 MG Cap Take 1 capsule (25 mg total) by mouth 4 (four) times daily. 30 capsule 0    lisinopril (PRINIVIL,ZESTRIL) 40 MG tablet Take 1 tablet (40 mg total) by mouth once daily. (Patient taking differently: Take 40 mg by mouth once daily. Pt taking 1/2 tab once daily) 90 tablet 3    lovastatin (MEVACOR) 20 MG tablet Take 1 tablet by mouth once daily 90 tablet 0    metoprolol succinate  (TOPROL-XL) 25 MG 24 hr tablet Take 1 tablet (25 mg total) by mouth once daily. 30 tablet 11    nitroGLYCERIN (NITROSTAT) 0.4 MG SL tablet Place 1 tablet (0.4 mg total) under the tongue every 5 (five) minutes as needed for Chest pain. 30 tablet 1    nystatin (DUKE'S SOLUTION) Take 10 mLs by mouth 4 (four) times daily. 120 mL 0    spironolactone (ALDACTONE) 25 MG tablet Take 1 tablet (25 mg total) by mouth once daily. 30 tablet 11    [DISCONTINUED] DULoxetine (CYMBALTA) 30 MG capsule Take 1 capsule (30 mg total) by mouth once daily. 90 capsule 1       SCHEDULED MEDS:   albuterol-ipratropium  3 mL Nebulization TID    aspirin  81 mg Oral Daily    buprenorphine-naloxone  1 tablet Sublingual Daily    cephALEXin  1,000 mg Oral Q8H    enoxaparin  40 mg Subcutaneous Q24H    fluticasone propionate  1 spray Each Nostril Daily    furosemide (LASIX) IV  40 mg Intravenous Q12H    lisinopriL  40 mg Oral Daily    metoprolol succinate  25 mg Oral Daily    polyethylene glycol  17 g Oral Daily    predniSONE  40 mg Oral QHS    senna-docusate 8.6-50 mg  1 tablet Oral BID       CONTINUOUS INFUSIONS:      PRN MEDS:acetaminophen, albuterol sulfate, calcium chloride IVPB, calcium chloride IVPB, calcium chloride IVPB, dextrose 50%, dextrose 50%, glucagon (human recombinant), glucose, glucose, HYDROcodone-acetaminophen, insulin aspart U-100, magnesium oxide, magnesium sulfate IVPB, magnesium sulfate IVPB, magnesium sulfate IVPB, magnesium sulfate IVPB, melatonin, morphine, ondansetron, potassium chloride in water, potassium chloride in water, potassium chloride in water, potassium chloride in water, potassium chloride, potassium chloride, potassium chloride, potassium chloride, promethazine (PHENERGAN) IVPB, sodium chloride 0.9%, sodium phosphate IVPB, sodium phosphate IVPB, sodium phosphate IVPB, sodium phosphate IVPB, sodium phosphate IVPB    LABS AND DIAGNOSTICS     CBC LAST 3 DAYS  Recent Labs   Lab 08/02/20  3535   08/02/20  1134 08/03/20  0409 08/04/20  0435   WBC 17.34*  --   --  23.66* 22.09*   RBC 5.51  --   --  4.88 4.67   HGB 15.6  --   --  14.3 13.5*   HCT 48.1   < > 51 43.1 40.9   MCV 87  --   --  88 88   MCH 28.3  --   --  29.3 28.9   MCHC 32.4  --   --  33.2 33.0   RDW 13.2  --   --  13.2 13.4     --   --  278 284   MPV 9.6  --   --  9.6 9.4   GRAN 91.5*  15.9*  --   --  95.0* 90.0*  19.9*   LYMPH 6.4*  1.1  --   --  3.0* 5.2*  1.1   MONO 1.6*  0.3  --   --  0.0* 3.9*  0.9   BASO 0.01  --   --   --  0.02   NRBC 0  --   --  0 0    < > = values in this interval not displayed.       COAGULATION LAST 3 DAYS  Recent Labs   Lab 08/01/20  0558   LABPT 13.0   INR 1.0       CHEMISTRY LAST 3 DAYS  Recent Labs   Lab 08/01/20  1935 08/02/20  0336 08/02/20  0543 08/02/20  1134 08/03/20  0409 08/04/20  0435   NA  --  139  --   --  136 139   K  --  3.8  --   --  3.1* 3.9   CL  --  101  --   --  96 98   CO2  --  24  --   --  27 30*   ANIONGAP  --  14  --   --  13 11   BUN  --  23  --   --  35* 40*   CREATININE  --  0.9  --   --  1.1 0.8   GLU  --  173*  --   --  234* 237*   CALCIUM  --  8.8  --   --  8.6* 9.2   PH 7.386  --  7.387 7.424  --   --    MG  --  1.9  --   --  2.2 2.2   ALBUMIN  --  3.9  --   --  3.9 3.7   PROT  --  7.1  --   --  7.2 6.6   ALKPHOS  --  49*  --   --  45* 39*   ALT  --  52*  --   --  35 28   AST  --  30  --   --  18 15   BILITOT  --  0.6  --   --  0.7 0.3       CARDIAC PROFILE LAST 3 DAYS  Recent Labs   Lab 08/01/20  0558 08/01/20  1130 08/01/20  1803   *  --   --    TROPONINI 0.036 0.432* 0.351*       ENDOCRINE LAST 3 DAYS  Recent Labs   Lab 08/01/20  0616   PROCAL <0.05       LAST ARTERIAL BLOOD GAS  ABG  Recent Labs   Lab 08/02/20  1134   PH 7.424   PO2 63*   PCO2 41.9   HCO3 27.5   BE 3       LAST 7 DAYS MICROBIOLOGY   Microbiology Results (last 7 days)     Procedure Component Value Units Date/Time    Blood Culture #1 **CANNOT BE ORDERED STAT** [[id number]] Collected: 08/01/20 0615     Order Status: Completed Specimen: Blood from Peripheral, Forearm, Right Updated: 08/03/20 1432     Blood Culture, Routine No Growth to date      No Growth to date      No Growth to date    Blood Culture #2 **CANNOT BE ORDERED STAT** [[id number]] Collected: 08/01/20 0616    Order Status: Completed Specimen: Blood from Peripheral, Upper Arm, Right Updated: 08/03/20 1432     Blood Culture, Routine No Growth to date      No Growth to date      No Growth to date    Culture, Respiratory with Gram Stain [[id number]]  (Abnormal) Collected: 08/01/20 0932    Order Status: Completed Specimen: Respiratory from Sputum Updated: 08/03/20 0649     Respiratory Culture Normal respiratory roshan      ENTEROBACTER CLOACAE  Few  For susceptibility see order #[id number]       Gram Stain (Respiratory) <10 epithelial cells per low power field.     Gram Stain (Respiratory) Few WBC's     Gram Stain (Respiratory) Rare Gram positive cocci    Culture, Respiratory with Gram Stain [[id number]]  (Abnormal)  (Susceptibility) Collected: 08/01/20 0631    Order Status: Completed Specimen: Respiratory from Endotracheal Aspirate Updated: 08/03/20 0647     Respiratory Culture Normal respiratory roshan also present      ENTEROBACTER CLOACAE  Moderate       Gram Stain (Respiratory) <10 epithelial cells per low power field.     Gram Stain (Respiratory) Few WBC's     Gram Stain (Respiratory) Few Gram positive cocci     Gram Stain (Respiratory) Rare Gram negative rods          MOST RECENT IMAGING  X-Ray Chest AP Portable  CLINICAL HISTORY:  60 years (1960) Male chf resp arrest    TECHNIQUE:  Portable AP radiograph the chest.    COMPARISON:  Most recent radiograph from August 1, 2020.    FINDINGS:  The lungs are clear, except for some vascular crowding bilaterally,  likely related to a suboptimal inspiration.  Costophrenic angles are  seen without effusion. No pneumothorax is identified. The heart is  mildly enlarged. The mediastinum is within normal limits.  Osseous  structures appear within normal limits. The visualized upper abdomen  is unremarkable.    Lines and tubes: Endotracheal tube with tip approximately 3 cm from  the tawanda. There is an enteric tube with tip at the GE junction.    IMPRESSION:  Mildly improved central hilar predominant interstitial opacities  suggesting improving pulmonary edema.    .    Electronically Signed by RAHUL Wright on 8/2/2020 6:21 AM      ECHOCARDIOGRAM RESULTS (last 5)  No results found for this or any previous visit.    CURRENT/PREVIOUS VISIT EKG  Results for orders placed or performed during the hospital encounter of 08/01/20   EKG 12-lead    Collection Time: 08/01/20  6:47 PM    Narrative    Test Reason : R06.02,    Vent. Rate : 068 BPM     Atrial Rate : 068 BPM     P-R Int : 154 ms          QRS Dur : 114 ms      QT Int : 444 ms       P-R-T Axes : 059 086 184 degrees     QTc Int : 472 ms    Normal sinus rhythm  Possible Inferior infarct ,age undetermined  Anteroseptal infarct (cited on or before 18-MAY-2017)  ST and T wave abnormality, consider lateral ischemia  Abnormal ECG  When compared with ECG of 01-AUG-2020 06:29,  No significant change was found  Confirmed by Lazaro Perez MD ([id number]) on 8/2/2020 2:54:57 PM    Referred By: DORITA ERIC           Confirmed By:Lazaro Perez MD           ASSESSMENT/PLAN:     Active Hospital Problems    Diagnosis    *Acute respiratory failure with hypoxia and hypercarbia    CHF (congestive heart failure)    Acute exacerbation of systolic CHF(30-35% EF)    Chronic narcotic dependence    COPD with acute exacerbation    Acute pulmonary edema    Hyperlipidemia with target low density lipoprotein (LDL) cholesterol less than 100 mg/dL    Hypertension   Conclusion    · Concentric left ventricular hypertrophy.  · Mild left ventricular enlargement.  · Mildly decreased left ventricular systolic function. The estimated ejection fraction is 40%.  · Grade I (mild) left ventricular  diastolic dysfunction consistent with impaired relaxation.  · Normal right ventricular systolic function.  · Mild left atrial enlargement.  · Local segmental wall motion abnormalities.  · Normal central venous pressure (3 mmHg).         ASSESSMENT & PLAN:     1. Acute Respiratory Failure with Hypoxia and Hypercapnia- IMPROVED  2. Acute on Chronic CHF with reduced ejection fraction- HF-r-EF  3. CAD S/P MI in 2012  4. Transaminitis-currently stable  5. Vitamin D Deficiency  6. H/O LVEF echo shows some improvement in EF about 40%.  7. Sinus Bradycardia    RECOMMENDATIONS:    Change IV diuretics to PO  Walk unit assess for oxygen saturation  Hold metoprolol for HR less than 60  Continue Lisinopril 40 mg daily  No further recommendations  Thank you      Ni Diggs NP  [facility]  Department of Cardiology  Date of Service: 08/04/2020  9:38 AM    I have personally interviewed and examined the patient, I have reviewed the Nurse Practitioner's history and physical, assessment, and plan. I have personally evaluated the patient at bedside and agree with the findings.      EMMA CUELLO MD

## 2020-08-04 NOTE — CARE UPDATE
08/04/20 1346   Home Oxygen Qualification   Room Air SpO2 At Rest 96 %   Room Air SpO2 on Exertion 94 %   Home O2 Eval Comments pt does not qualify for home o2

## 2020-08-04 NOTE — SUBJECTIVE & OBJECTIVE
Interval History: stable    Review of Systems   Constitutional: Negative.    HENT: Negative.    Eyes: Negative.    Respiratory: Positive for cough.    Cardiovascular: Negative.    Gastrointestinal: Negative.    Endocrine: Negative.    Genitourinary: Negative.    Musculoskeletal: Negative.    Skin: Negative.    Allergic/Immunologic: Negative.    Neurological: Negative.    Hematological: Negative.    All other systems reviewed and are negative.    Objective:     Vital Signs (Most Recent):  Temp: 97.8 °F (36.6 °C) (08/04/20 0725)  Pulse: (!) 45 (08/04/20 0725)  Resp: 18 (08/04/20 0725)  BP: (!) 111/58 (08/04/20 0725)  SpO2: (!) 94 % (08/04/20 0725) Vital Signs (24h Range):  Temp:  [97.6 °F (36.4 °C)-98.1 °F (36.7 °C)] 97.8 °F (36.6 °C)  Pulse:  [45-76] 45  Resp:  [12-36] 18  SpO2:  [90 %-95 %] 94 %  BP: ()/(53-65) 111/58     Weight: 83.3 kg (183 lb 10.3 oz)  Body mass index is 29.64 kg/m².  No intake or output data in the 24 hours ending 08/04/20 0854   Physical Exam  Vitals signs and nursing note reviewed.   Constitutional:       Appearance: He is well-developed.   HENT:      Head: Normocephalic and atraumatic.      Right Ear: External ear normal.      Left Ear: External ear normal.      Nose: Nose normal.   Eyes:      Conjunctiva/sclera: Conjunctivae normal.      Pupils: Pupils are equal, round, and reactive to light.   Neck:      Musculoskeletal: Normal range of motion and neck supple.   Cardiovascular:      Rate and Rhythm: Regular rhythm. Bradycardia present.      Heart sounds: Normal heart sounds.   Pulmonary:      Comments: Decreased entry bases with coarse large airway sounds and scattered expiratory wheezes. No opening creps   Abdominal:      General: Bowel sounds are normal.      Palpations: Abdomen is soft.   Musculoskeletal: Normal range of motion.   Skin:     General: Skin is warm and dry.      Capillary Refill: Capillary refill takes less than 2 seconds.   Neurological:      Mental Status: He is  alert and oriented to person, place, and time.   Psychiatric:         Behavior: Behavior normal.         Thought Content: Thought content normal.         Judgment: Judgment normal.         Significant Labs:   CBC:   Recent Labs   Lab 08/02/20  1134 08/03/20  0409 08/04/20  0435   WBC  --  23.66* 22.09*   HGB  --  14.3 13.5*   HCT 51 43.1 40.9   PLT  --  278 284     CMP:   Recent Labs   Lab 08/03/20  0409 08/04/20 0435    139   K 3.1* 3.9   CL 96 98   CO2 27 30*   * 237*   BUN 35* 40*   CREATININE 1.1 0.8   CALCIUM 8.6* 9.2   PROT 7.2 6.6   ALBUMIN 3.9 3.7   BILITOT 0.7 0.3   ALKPHOS 45* 39*   AST 18 15   ALT 35 28   ANIONGAP 13 11   EGFRNONAA >60.0 >60.0       Significant Imaging: I have reviewed and interpreted all pertinent imaging results/findings within the past 24 hours.

## 2020-08-04 NOTE — PLAN OF CARE
08/03/20 2048   Patient Assessment/Suction   Level of Consciousness (AVPU) alert   Respiratory Effort Normal;Unlabored   Expansion/Accessory Muscles/Retractions no use of accessory muscles   All Lung Fields Breath Sounds diminished;clear   Cough Type nonproductive   PRE-TX-O2   O2 Device (Oxygen Therapy) High Flow nasal Cannula   Flow (L/min) 3   SpO2 (!) 94 %   Pulse Oximetry Type Continuous   $ Pulse Oximetry - Multiple Charge Pulse Oximetry - Multiple   Pulse 76   Resp 16   Aerosol Therapy   $ Aerosol Therapy Charges Aerosol Treatment   Daily Review of Necessity (SVN) completed   Respiratory Treatment Status (SVN) given   Treatment Route (SVN) mouthpiece   Patient Position (SVN) semi-Nascimento's   Post Treatment Assessment (SVN) increased aeration   Signs of Intolerance (SVN) none   Breath Sounds Post-Respiratory Treatment   Throughout All Fields Post-Treatment All Fields   Throughout All Fields Post-Treatment aeration increased   Post-treatment Heart Rate (beats/min) 54   Post-treatment Resp Rate (breaths/min) 16   change to tid and prn/improved chf

## 2020-08-04 NOTE — PLAN OF CARE
08/04/20 0725   Patient Assessment/Suction   Level of Consciousness (AVPU) alert   Respiratory Effort Normal;Unlabored   BLU Breath Sounds clear   PRE-TX-O2   O2 Device (Oxygen Therapy) High Flow nasal Cannula   $ Is the patient on Low Flow Oxygen? Yes   Flow (L/min) 3   SpO2 (!) 94 %   Pulse Oximetry Type Intermittent   $ Pulse Oximetry - Multiple Charge Pulse Oximetry - Multiple   Pulse (!) 45   Resp 18   Aerosol Therapy   $ Aerosol Therapy Charges Aerosol Treatment   Daily Review of Necessity (SVN) completed   Respiratory Treatment Status (SVN) given   Treatment Route (SVN) mask;oxygen   Patient Position (SVN) HOB elevated   Post Treatment Assessment (SVN) breath sounds improved   Signs of Intolerance (SVN) none   Respiratory Evaluation   $ Care Plan Tech Time 15 min

## 2020-08-04 NOTE — HOSPITAL COURSE
"08/04 Assumed care. Chart reviewed. Consultants' attendance noted and appreciated. Labs reviewed: has leukocytosis but is on high dose prednisone. Apyrexic. Bradycardia this AM--metoprolol held. Breathing "Easier" today. Asking about discharge. Has cough with no sputum. No CP.    08/05 Feels "Really good" today and would like to be discharged home. Has been cleared by Pulmonology.  VSS  Lungs: good entry without adventitious sounds  Heart: S1S2  Abdo: soft  "

## 2020-08-04 NOTE — PROGRESS NOTES
"Critical access hospital Medicine  Progress Note    Patient Name: Kal Dodd  MRN: [id number]  Patient Class: IP- Inpatient   Admission Date: 8/1/2020  Length of Stay: 3 days  Attending Physician: Marin Suero MD  Primary Care Provider: Ras Sharma MD        Subjective:     Principal Problem:Acute respiratory failure with hypoxia and hypercarbia        HPI:  60-year-old gentleman with past medical history of CAD status post stenting in 2012, chronic systolic CHF(30-35% EF), COPD, heavy tobacco use, opiate dependent, niddm, hyperlipidemia was brought by EMS due to respiratory arrest.  Upon my assessment patient is already intubated and on propofol.  History was obtained from ER physician.  As per my report from ED provider patient was complaining of shortness of breath at home and sudden collapse.  Friend called EMS and he was found to be unresponsive with agonal respiration.  Ambu bag was placed and was intubated upon arrival to ED due to severe hypoxia and hypercapnia.  According to ED physician patient has some generalized shakes that quickly resolved with propofol and immediately patient was following commands on propofol and still does on my assessment.  Patient did not lose pulse as per EMS.  No family member available at bedside.  Full review of system is not possible due to intubation and sedation.     Overview/Hospital Course:  08/04 Assumed care. Chart reviewed. Consultants' attendance noted and appreciated. Labs reviewed: has leukocytosis but is on high dose prednisone. Apyrexic. Bradycardia this AM--metoprolol held. Breathing "Easier" today. Asking about discharge. Has cough with no sputum. No CP.    Interval History: stable    Review of Systems   Constitutional: Negative.    HENT: Negative.    Eyes: Negative.    Respiratory: Positive for cough.    Cardiovascular: Negative.    Gastrointestinal: Negative.    Endocrine: Negative.    Genitourinary: Negative.    Musculoskeletal: " Negative.    Skin: Negative.    Allergic/Immunologic: Negative.    Neurological: Negative.    Hematological: Negative.    All other systems reviewed and are negative.    Objective:     Vital Signs (Most Recent):  Temp: 97.8 °F (36.6 °C) (08/04/20 0725)  Pulse: (!) 45 (08/04/20 0725)  Resp: 18 (08/04/20 0725)  BP: (!) 111/58 (08/04/20 0725)  SpO2: (!) 94 % (08/04/20 0725) Vital Signs (24h Range):  Temp:  [97.6 °F (36.4 °C)-98.1 °F (36.7 °C)] 97.8 °F (36.6 °C)  Pulse:  [45-76] 45  Resp:  [12-36] 18  SpO2:  [90 %-95 %] 94 %  BP: ()/(53-65) 111/58     Weight: 83.3 kg (183 lb 10.3 oz)  Body mass index is 29.64 kg/m².  No intake or output data in the 24 hours ending 08/04/20 0854   Physical Exam  Vitals signs and nursing note reviewed.   Constitutional:       Appearance: He is well-developed.   HENT:      Head: Normocephalic and atraumatic.      Right Ear: External ear normal.      Left Ear: External ear normal.      Nose: Nose normal.   Eyes:      Conjunctiva/sclera: Conjunctivae normal.      Pupils: Pupils are equal, round, and reactive to light.   Neck:      Musculoskeletal: Normal range of motion and neck supple.   Cardiovascular:      Rate and Rhythm: Regular rhythm. Bradycardia present.      Heart sounds: Normal heart sounds.   Pulmonary:      Comments: Decreased entry bases with coarse large airway sounds and scattered expiratory wheezes. No opening creps   Abdominal:      General: Bowel sounds are normal.      Palpations: Abdomen is soft.   Musculoskeletal: Normal range of motion.   Skin:     General: Skin is warm and dry.      Capillary Refill: Capillary refill takes less than 2 seconds.   Neurological:      Mental Status: He is alert and oriented to person, place, and time.   Psychiatric:         Behavior: Behavior normal.         Thought Content: Thought content normal.         Judgment: Judgment normal.         Significant Labs:   CBC:   Recent Labs   Lab 08/02/20  1134 08/03/20  0409 08/04/20  0435    WBC  --  23.66* 22.09*   HGB  --  14.3 13.5*   HCT 51 43.1 40.9   PLT  --  278 284     CMP:   Recent Labs   Lab 08/03/20  0409 08/04/20  0435    139   K 3.1* 3.9   CL 96 98   CO2 27 30*   * 237*   BUN 35* 40*   CREATININE 1.1 0.8   CALCIUM 8.6* 9.2   PROT 7.2 6.6   ALBUMIN 3.9 3.7   BILITOT 0.7 0.3   ALKPHOS 45* 39*   AST 18 15   ALT 35 28   ANIONGAP 13 11   EGFRNONAA >60.0 >60.0       Significant Imaging: I have reviewed and interpreted all pertinent imaging results/findings within the past 24 hours.      Assessment/Plan:      * Acute respiratory failure with hypoxia and hypercarbia  Resolved      CHF (congestive heart failure)  Stable  continue current regimen    Acute pulmonary edema  Resolved      COPD with acute exacerbation  continue current regimen      Chronic narcotic dependence  On Suboxone      Acute exacerbation of systolic CHF(30-35% EF)  Improved; continue current regimen  Hopefully discharge next 24 hours    Hyperlipidemia with target low density lipoprotein (LDL) cholesterol less than 100 mg/dL  continue current regimen      Hypertension  continue current regimen        VTE Risk Mitigation (From admission, onward)         Ordered     enoxaparin injection 40 mg  Every 24 hours      08/01/20 0849     IP VTE HIGH RISK PATIENT  Once      08/01/20 0849     Place sequential compression device  Until discontinued      08/01/20 0849                      Marin Suero MD  Department of Hospital Medicine   [facility]

## 2020-08-04 NOTE — PROGRESS NOTES
[facility]  Pulmonology  Progress Note    Subjective     8/3/2020:  No major issues overnight.  Subjectively improved over the past 24 hours.  Still with some wheezing and shortness of breath.  No new complaints.  8/4/2020:  No major issues overnight.  Subjectively improved and back near baseline.  No new complaints.     Review of Systems   Constitutional: Negative for fever, chills and night sweats.   Respiratory: Positive for wheezing and dyspnea on extertion. Negative for cough and sputum production.    Cardiovascular: Negative for chest pain, palpitations and leg swelling.   Gastrointestinal: Negative for nausea, abdominal pain and abdominal distention.      I have personally reviewed the following during today's evaluation:  past medical history, ROS, family history, social history, surgical history, current inpatient medications,drug allergies, vital signs over the past 24 hours, results of relevant diagnostic studies and nursing/provider documentation from the past 24 hours.     Objective     VS Temp:  [97.6 °F (36.4 °C)-98.1 °F (36.7 °C)]   Pulse:  [45-76]   Resp:  [16-20]   BP: ()/(53-68)   SpO2:  [91 %-94 %]   Ideal body weight: 63.8 kg (140 lb 10.5 oz)  Adjusted ideal body weight: 71.6 kg (157 lb 13.6 oz)   I/O   Intake/Output Summary (Last 24 hours) at 8/4/2020 1831  Last data filed at 8/4/2020 0915  Gross per 24 hour   Intake 120 ml   Output --   Net 120 ml        Vent SpO2 (!) 92% on room air   PE Physical Exam   Constitutional: He is oriented to person, place, and time. He appears well-developed and well-nourished.  Non-toxic appearance. No distress.   Walking around his room with his oxygen off.  Comfortable and in NAD   HENT:   Head: Normocephalic and atraumatic.   Mouth/Throat: Uvula is midline, oropharynx is clear and moist and mucous membranes are normal. Mallampati Score: II.   Neck: Trachea normal and normal range of motion. Neck supple. No thyromegaly present.    Cardiovascular: Normal rate, regular rhythm, normal heart sounds and intact distal pulses.   Pulmonary/Chest: Normal expansion, symmetric chest wall expansion and effort normal. He has no wheezes. He has no rhonchi. He has no rales.   Abdominal: Soft. Bowel sounds are normal. He exhibits no distension. There is no abdominal tenderness.   Musculoskeletal: Normal range of motion.         General: No tenderness, deformity or edema.   Lymphadenopathy: No supraclavicular adenopathy is present.     He has no cervical adenopathy.     He has no axillary adenopathy.   Neurological: He is alert and oriented to person, place, and time. He has normal strength. No cranial nerve deficit or sensory deficit. GCS eye subscore is 4. GCS verbal subscore is 5. GCS motor subscore is 6.   Skin: Skin is warm, dry and intact. No rash noted.   Psychiatric: He has a normal mood and affect.   Nursing note and vitals reviewed.        Labs I have personally reviewed and interpreted all labs / diagnostic studies obtained over the past 24 hours, and relevant results are as follows:  Recent Labs   Lab 08/04/20  0435   WBC 22.09*   RBC 4.67   HGB 13.5*   HCT 40.9      MCV 88   MCH 28.9   MCHC 33.0      K 3.9   CL 98   CO2 30*   BUN 40*   CREATININE 0.8   MG 2.2   ALT 28   AST 15   ALKPHOS 39*   BILITOT 0.3   PROT 6.6   ALBUMIN 3.7      Imaging I have personally reviewed and interpreted the following images and reviewed the associated Radiology report.  I have reviewed and interpreted all pertinent imaging results/findings within the past 24 hours.  CXR  8/2/2020:  Mildly improved central hilar predominant interstitial opacities suggesting improving pulmonary edema.     Micro I have personally reviewed and interpreted the available culture data.  Relevant results are as follows.  Blood Culture   Lab Results   Component Value Date    LABBLOO No Growth to date 08/01/2020    LABBLOO No Growth to date 08/01/2020    LABBLOO No Growth to  date 08/01/2020    LABBLOO No Growth to date 08/01/2020   , Sputum Culture   Lab Results   Component Value Date    GSRESP <10 epithelial cells per low power field. 08/01/2020    GSRESP Few WBC's 08/01/2020    GSRESP Rare Gram positive cocci 08/01/2020    RESPIRATORYC Normal respiratory roshan 08/01/2020    RESPIRATORYC (A) 08/01/2020     ENTEROBACTER CLOACAE  Few  For susceptibility see order #[id number]      and Urine Culture  No results found for: LABURIN   Medications Scheduled    albuterol-ipratropium  3 mL Nebulization TID    aspirin  81 mg Oral Daily    buprenorphine-naloxone  1 tablet Sublingual Daily    cephALEXin  1,000 mg Oral Q8H    enoxaparin  40 mg Subcutaneous Q24H    fluticasone propionate  1 spray Each Nostril Daily    furosemide (LASIX) IV  40 mg Intravenous Q12H    lisinopriL  40 mg Oral Daily    metoprolol succinate  25 mg Oral Daily    polyethylene glycol  17 g Oral Daily    predniSONE  40 mg Oral QHS    senna-docusate 8.6-50 mg  1 tablet Oral BID      Continuous Infusions:      PRN   acetaminophen, albuterol sulfate, calcium chloride IVPB, calcium chloride IVPB, calcium chloride IVPB, dextrose 50%, dextrose 50%, glucagon (human recombinant), glucose, glucose, HYDROcodone-acetaminophen, insulin aspart U-100, magnesium oxide, magnesium sulfate IVPB, magnesium sulfate IVPB, magnesium sulfate IVPB, magnesium sulfate IVPB, melatonin, morphine, ondansetron, potassium chloride in water, potassium chloride in water, potassium chloride in water, potassium chloride in water, potassium chloride, potassium chloride, potassium chloride, potassium chloride, promethazine (PHENERGAN) IVPB, sodium chloride 0.9%, sodium phosphate IVPB, sodium phosphate IVPB, sodium phosphate IVPB, sodium phosphate IVPB, sodium phosphate IVPB        Assessment       Active Hospital Problems    Diagnosis    *Acute respiratory failure with hypoxia and hypercarbia    CHF (congestive heart failure)    Acute exacerbation  of systolic CHF(30-35% EF)    Chronic narcotic dependence    COPD with acute exacerbation    Acute pulmonary edema    Hyperlipidemia with target low density lipoprotein (LDL) cholesterol less than 100 mg/dL    Hypertension      My Impression:  Improving AEoCOPD/Aspiration pneumonia.    Plan     Pulmonary  · Transition to prednisone to complete 5 days total.  · Frequent aerosolized BDs.  · 7-10 days of antibiotics for PNA.  · Titrate supplemental oxygen to maintain SpO2 > 90%.  · Stable to transfer to the outpatient setting.     Benjamin Palma MD  Pulmonary / Critical Care Medicine  [facility]

## 2020-08-05 VITALS
TEMPERATURE: 98 F | SYSTOLIC BLOOD PRESSURE: 140 MMHG | DIASTOLIC BLOOD PRESSURE: 74 MMHG | HEART RATE: 71 BPM | RESPIRATION RATE: 15 BRPM | WEIGHT: 183.63 LBS | HEIGHT: 66 IN | BODY MASS INDEX: 29.51 KG/M2 | OXYGEN SATURATION: 95 %

## 2020-08-05 PROBLEM — I50.9 CHF (CONGESTIVE HEART FAILURE): Status: RESOLVED | Noted: 2020-08-03 | Resolved: 2020-08-05

## 2020-08-05 LAB
ALBUMIN SERPL BCP-MCNC: 4.1 G/DL (ref 3.5–5.2)
ALP SERPL-CCNC: 39 U/L (ref 55–135)
ALT SERPL W/O P-5'-P-CCNC: 24 U/L (ref 10–44)
ANION GAP SERPL CALC-SCNC: 12 MMOL/L (ref 8–16)
ANION GAP SERPL CALC-SCNC: 12 MMOL/L (ref 8–16)
AST SERPL-CCNC: 15 U/L (ref 10–40)
BASOPHILS # BLD AUTO: 0.02 K/UL (ref 0–0.2)
BASOPHILS # BLD AUTO: 0.02 K/UL (ref 0–0.2)
BASOPHILS NFR BLD: 0.1 % (ref 0–1.9)
BASOPHILS NFR BLD: 0.1 % (ref 0–1.9)
BILIRUB SERPL-MCNC: 0.8 MG/DL (ref 0.1–1)
BUN SERPL-MCNC: 36 MG/DL (ref 8–23)
BUN SERPL-MCNC: 36 MG/DL (ref 8–23)
CALCIUM SERPL-MCNC: 9.1 MG/DL (ref 8.7–10.5)
CALCIUM SERPL-MCNC: 9.1 MG/DL (ref 8.7–10.5)
CHLORIDE SERPL-SCNC: 95 MMOL/L (ref 95–110)
CHLORIDE SERPL-SCNC: 95 MMOL/L (ref 95–110)
CO2 SERPL-SCNC: 32 MMOL/L (ref 23–29)
CO2 SERPL-SCNC: 32 MMOL/L (ref 23–29)
CREAT SERPL-MCNC: 0.7 MG/DL (ref 0.5–1.4)
CREAT SERPL-MCNC: 0.7 MG/DL (ref 0.5–1.4)
DIFFERENTIAL METHOD: ABNORMAL
DIFFERENTIAL METHOD: ABNORMAL
EOSINOPHIL # BLD AUTO: 0 K/UL (ref 0–0.5)
EOSINOPHIL # BLD AUTO: 0 K/UL (ref 0–0.5)
EOSINOPHIL NFR BLD: 0 % (ref 0–8)
EOSINOPHIL NFR BLD: 0 % (ref 0–8)
ERYTHROCYTE [DISTWIDTH] IN BLOOD BY AUTOMATED COUNT: 13.2 % (ref 11.5–14.5)
ERYTHROCYTE [DISTWIDTH] IN BLOOD BY AUTOMATED COUNT: 13.2 % (ref 11.5–14.5)
EST. GFR  (AFRICAN AMERICAN): >60 ML/MIN/1.73 M^2
EST. GFR  (AFRICAN AMERICAN): >60 ML/MIN/1.73 M^2
EST. GFR  (NON AFRICAN AMERICAN): >60 ML/MIN/1.73 M^2
EST. GFR  (NON AFRICAN AMERICAN): >60 ML/MIN/1.73 M^2
GLUCOSE SERPL-MCNC: 164 MG/DL (ref 70–110)
GLUCOSE SERPL-MCNC: 181 MG/DL (ref 70–110)
GLUCOSE SERPL-MCNC: 181 MG/DL (ref 70–110)
HCT VFR BLD AUTO: 46.8 % (ref 40–54)
HCT VFR BLD AUTO: 46.8 % (ref 40–54)
HGB BLD-MCNC: 15.4 G/DL (ref 14–18)
HGB BLD-MCNC: 15.4 G/DL (ref 14–18)
IMM GRANULOCYTES # BLD AUTO: 0.11 K/UL (ref 0–0.04)
IMM GRANULOCYTES # BLD AUTO: 0.11 K/UL (ref 0–0.04)
IMM GRANULOCYTES NFR BLD AUTO: 0.7 % (ref 0–0.5)
IMM GRANULOCYTES NFR BLD AUTO: 0.7 % (ref 0–0.5)
LYMPHOCYTES # BLD AUTO: 1.5 K/UL (ref 1–4.8)
LYMPHOCYTES # BLD AUTO: 1.5 K/UL (ref 1–4.8)
LYMPHOCYTES NFR BLD: 9.5 % (ref 18–48)
LYMPHOCYTES NFR BLD: 9.5 % (ref 18–48)
MAGNESIUM SERPL-MCNC: 2.4 MG/DL (ref 1.6–2.6)
MCH RBC QN AUTO: 28.7 PG (ref 27–31)
MCH RBC QN AUTO: 28.7 PG (ref 27–31)
MCHC RBC AUTO-ENTMCNC: 32.9 G/DL (ref 32–36)
MCHC RBC AUTO-ENTMCNC: 32.9 G/DL (ref 32–36)
MCV RBC AUTO: 87 FL (ref 82–98)
MCV RBC AUTO: 87 FL (ref 82–98)
MONOCYTES # BLD AUTO: 0.7 K/UL (ref 0.3–1)
MONOCYTES # BLD AUTO: 0.7 K/UL (ref 0.3–1)
MONOCYTES NFR BLD: 4.4 % (ref 4–15)
MONOCYTES NFR BLD: 4.4 % (ref 4–15)
NEUTROPHILS # BLD AUTO: 13.9 K/UL (ref 1.8–7.7)
NEUTROPHILS # BLD AUTO: 13.9 K/UL (ref 1.8–7.7)
NEUTROPHILS NFR BLD: 85.3 % (ref 38–73)
NEUTROPHILS NFR BLD: 85.3 % (ref 38–73)
NRBC BLD-RTO: 0 /100 WBC
NRBC BLD-RTO: 0 /100 WBC
PHOSPHATE SERPL-MCNC: 3.6 MG/DL (ref 2.7–4.5)
PLATELET # BLD AUTO: 285 K/UL (ref 150–350)
PLATELET # BLD AUTO: 285 K/UL (ref 150–350)
PMV BLD AUTO: 9.4 FL (ref 9.2–12.9)
PMV BLD AUTO: 9.4 FL (ref 9.2–12.9)
POTASSIUM SERPL-SCNC: 3.9 MMOL/L (ref 3.5–5.1)
POTASSIUM SERPL-SCNC: 3.9 MMOL/L (ref 3.5–5.1)
PROT SERPL-MCNC: 7 G/DL (ref 6–8.4)
RBC # BLD AUTO: 5.37 M/UL (ref 4.6–6.2)
RBC # BLD AUTO: 5.37 M/UL (ref 4.6–6.2)
SODIUM SERPL-SCNC: 139 MMOL/L (ref 136–145)
SODIUM SERPL-SCNC: 139 MMOL/L (ref 136–145)
WBC # BLD AUTO: 16.25 K/UL (ref 3.9–12.7)
WBC # BLD AUTO: 16.25 K/UL (ref 3.9–12.7)

## 2020-08-05 PROCEDURE — 99900035 HC TECH TIME PER 15 MIN (STAT)

## 2020-08-05 PROCEDURE — 82962 GLUCOSE BLOOD TEST: CPT

## 2020-08-05 PROCEDURE — 83735 ASSAY OF MAGNESIUM: CPT

## 2020-08-05 PROCEDURE — 85025 COMPLETE CBC W/AUTO DIFF WBC: CPT

## 2020-08-05 PROCEDURE — 25000003 PHARM REV CODE 250: Performed by: HOSPITALIST

## 2020-08-05 PROCEDURE — 36415 COLL VENOUS BLD VENIPUNCTURE: CPT

## 2020-08-05 PROCEDURE — 25000242 PHARM REV CODE 250 ALT 637 W/ HCPCS: Performed by: HOSPITALIST

## 2020-08-05 PROCEDURE — 94640 AIRWAY INHALATION TREATMENT: CPT

## 2020-08-05 PROCEDURE — 94761 N-INVAS EAR/PLS OXIMETRY MLT: CPT

## 2020-08-05 PROCEDURE — 84100 ASSAY OF PHOSPHORUS: CPT

## 2020-08-05 PROCEDURE — 80053 COMPREHEN METABOLIC PANEL: CPT

## 2020-08-05 RX ORDER — PREDNISONE 20 MG/1
TABLET ORAL
Qty: 25 TABLET | Refills: 0 | Status: SHIPPED | OUTPATIENT
Start: 2020-08-05 | End: 2020-08-25

## 2020-08-05 RX ORDER — CEPHALEXIN 500 MG/1
1000 CAPSULE ORAL EVERY 8 HOURS
Qty: 30 CAPSULE | Refills: 0 | Status: SHIPPED | OUTPATIENT
Start: 2020-08-05 | End: 2020-08-15

## 2020-08-05 RX ADMIN — CEPHALEXIN 1000 MG: 250 CAPSULE ORAL at 06:08

## 2020-08-05 RX ADMIN — IPRATROPIUM BROMIDE AND ALBUTEROL SULFATE 3 ML: .5; 3 SOLUTION RESPIRATORY (INHALATION) at 07:08

## 2020-08-05 NOTE — PLAN OF CARE
Plan of care reviewed with pt along with medications administered and fall precautions. Refused bed alarm and agitated that he couldn't have another dose of suboxone. Increased agitation resulted in pt refusing to have 0300 neuro check performed and threatened to leave AMA.  Problem: Fall Injury Risk  Goal: Absence of Fall and Fall-Related Injury  Outcome: Ongoing, Progressing     Problem: Adult Inpatient Plan of Care  Goal: Plan of Care Review  Outcome: Ongoing, Progressing  Goal: Patient-Specific Goal (Individualization)  Outcome: Ongoing, Progressing  Goal: Absence of Hospital-Acquired Illness or Injury  Outcome: Ongoing, Progressing  Goal: Optimal Comfort and Wellbeing  Outcome: Ongoing, Progressing  Goal: Readiness for Transition of Care  Outcome: Ongoing, Progressing  Goal: Rounds/Family Conference  Outcome: Ongoing, Progressing     Problem: Infection  Goal: Infection Symptom Resolution  Outcome: Ongoing, Progressing     Problem: Restraint, Nonbehavioral (Nonviolent)  Goal: Discontinuation Criteria Achieved  Outcome: Ongoing, Progressing  Goal: Personal Dignity and Safety Maintained  Outcome: Ongoing, Progressing     Problem: Skin Injury Risk Increased  Goal: Skin Health and Integrity  Outcome: Ongoing, Progressing     Problem: Communication Impairment (Mechanical Ventilation, Invasive)  Goal: Effective Communication  Outcome: Ongoing, Progressing     Problem: Device-Related Complication Risk (Mechanical Ventilation, Invasive)  Goal: Optimal Device Function  Outcome: Ongoing, Progressing     Problem: Inability to Wean (Mechanical Ventilation, Invasive)  Goal: Mechanical Ventilation Liberation  Outcome: Ongoing, Progressing     Problem: Nutrition Impairment (Mechanical Ventilation, Invasive)  Goal: Optimal Nutrition Delivery  Outcome: Ongoing, Progressing     Problem: Skin and Tissue Injury (Mechanical Ventilation, Invasive)  Goal: Absence of Device-Related Skin and Tissue Injury  Outcome: Ongoing,  Progressing     Problem: Ventilator-Induced Lung Injury (Mechanical Ventilation, Invasive)  Goal: Absence of Ventilator-Induced Lung Injury  Outcome: Ongoing, Progressing     Problem: Communication Impairment (Artificial Airway)  Goal: Effective Communication  Outcome: Ongoing, Progressing     Problem: Device-Related Complication Risk (Artificial Airway)  Goal: Optimal Device Function  Outcome: Ongoing, Progressing     Problem: Skin and Tissue Injury (Artificial Airway)  Goal: Absence of Device-Related Skin or Tissue Injury  Outcome: Ongoing, Progressing     Problem: Noninvasive Ventilation Acute  Goal: Effective Unassisted Ventilation and Oxygenation  Outcome: Ongoing, Progressing

## 2020-08-05 NOTE — PLAN OF CARE
08/05/20 0739   Patient Assessment/Suction   Level of Consciousness (AVPU) alert   All Lung Fields Breath Sounds wheezes, expiratory   PRE-TX-O2   O2 Device (Oxygen Therapy) room air   SpO2 95 %   Pulse Oximetry Type Intermittent   $ Pulse Oximetry - Multiple Charge Pulse Oximetry - Multiple   Pulse 71   Resp 15   Aerosol Therapy   $ Aerosol Therapy Charges Aerosol Treatment   Daily Review of Necessity (SVN) completed   Respiratory Treatment Status (SVN) given   Treatment Route (SVN) mouthpiece   Patient Position (SVN) semi-Nascimento's   Post Treatment Assessment (SVN) increased aeration   Signs of Intolerance (SVN) none   Breath Sounds Post-Respiratory Treatment   Post-treatment Heart Rate (beats/min) 62   Post-treatment Resp Rate (breaths/min) 15   Respiratory Evaluation   $ Care Plan Tech Time 15 min

## 2020-08-05 NOTE — DISCHARGE SUMMARY
"[facility] Medicine  Discharge Summary      Patient Name: Kal Dodd  MRN: [id number]  Admission Date: 8/1/2020  Hospital Length of Stay: 4 days  Discharge Date and Time:  08/05/2020 8:34 AM  Attending Physician: Marin Suero MD   Discharging Provider: Marin Suero MD  Primary Care Provider: Ras Sharma MD      HPI:   60-year-old gentleman with past medical history of CAD status post stenting in 2012, chronic systolic CHF(30-35% EF), COPD, heavy tobacco use, opiate dependent, niddm, hyperlipidemia was brought by EMS due to respiratory arrest.  Upon my assessment patient is already intubated and on propofol.  History was obtained from ER physician.  As per my report from ED provider patient was complaining of shortness of breath at home and sudden collapse.  Friend called EMS and he was found to be unresponsive with agonal respiration.  Ambu bag was placed and was intubated upon arrival to ED due to severe hypoxia and hypercapnia.  According to ED physician patient has some generalized shakes that quickly resolved with propofol and immediately patient was following commands on propofol and still does on my assessment.  Patient did not lose pulse as per EMS.  No family member available at bedside.  Full review of system is not possible due to intubation and sedation.     * No surgery found *      Hospital Course:   08/04 Assumed care. Chart reviewed. Consultants' attendance noted and appreciated. Labs reviewed: has leukocytosis but is on high dose prednisone. Apyrexic. Bradycardia this AM--metoprolol held. Breathing "Easier" today. Asking about discharge. Has cough with no sputum. No CP.    08/05 Feels "Really good" today and would like to be discharged home. Has been cleared by Pulmonology.  VSS  Lungs: good entry without adventitious sounds  Heart: S1S2  Abdo: soft   Addendum: Patient was also discharged on tapering dose of prednisone, inadvertently omitted from initial med " rec. It has been added    Consults:   Consults (From admission, onward)        Status Ordering Provider     Inpatient consult to Cardiology  Once     Provider:  Lazaro Perez MD    Completed MALLIKA TYLER     Inpatient consult to Internal Medicine  Once     Provider:  Mallika Tyler MD    Acknowledged MALLIKA TYLER     Inpatient consult to neurology  Once     Provider:  Horacio Phillips MD    Acknowledged MALLIKA TYLER     Inpatient consult to Pulmonology  Once     Provider:  Usama García MD    Completed MALLIKA TYLER          * Acute respiratory failure with hypoxia and hypercarbia  Resolved      Acute pulmonary edema  Resolved      COPD with acute exacerbation  continue current regimen      Chronic narcotic dependence  On Suboxone      Acute exacerbation of systolic CHF(30-35% EF)  Continue current regimen    Hyperlipidemia with target low density lipoprotein (LDL) cholesterol less than 100 mg/dL  continue current regimen      Hypertension  continue current regimen        Final Active Diagnoses:    Diagnosis Date Noted POA    PRINCIPAL PROBLEM:  Acute respiratory failure with hypoxia and hypercarbia [J96.01, J96.02] 08/01/2020 Yes    Acute exacerbation of systolic CHF(30-35% EF) [I50.21] 08/01/2020 Yes    Chronic narcotic dependence [F11.20] 08/01/2020 Yes    COPD with acute exacerbation [J44.1] 08/01/2020 Yes    Acute pulmonary edema [J81.0]  Unknown    Hyperlipidemia with target low density lipoprotein (LDL) cholesterol less than 100 mg/dL [E78.5] 02/13/2013 Yes    Hypertension [I10]  Yes      Problems Resolved During this Admission:    Diagnosis Date Noted Date Resolved POA    CHF (congestive heart failure) [I50.9] 08/03/2020 08/05/2020 Yes    Respiratory arrest [R09.2] 08/01/2020 08/03/2020 Yes    Pulmonary edema with congestive heart failure with reduced left ventricular function [I50.1] 08/01/2020 08/04/2020 Yes    Coronary artery disease, shock, CHF, 5 ION stents in LAD,  4/2012 [I25.10] 04/26/2012 08/04/2020 Yes       Discharged Condition: good    Disposition: Home or Self Care    Follow Up:  Follow-up Information     Lazaro Perez MD In 1 week.    Specialties: Cardiology, INTERVENTIONAL CARDIOLOGY  Why: Post discharge follow-up  Contact information:  [address]  [address]  [facility]  [place] [address]  [phone]                 Patient Instructions:      Diet Cardiac     Activity as tolerated       Significant Diagnostic Studies: Labs:   BMP:   Recent Labs   Lab 08/04/20 0435 08/05/20  0447   * 181*  181*    139  139   K 3.9 3.9  3.9   CL 98 95  95   CO2 30* 32*  32*   BUN 40* 36*  36*   CREATININE 0.8 0.7  0.7   CALCIUM 9.2 9.1  9.1   MG 2.2 2.4    and CBC   Recent Labs   Lab 08/04/20 0435 08/05/20 0447   WBC 22.09* 16.25*  16.25*   HGB 13.5* 15.4  15.4   HCT 40.9 46.8  46.8    285  285       Pending Diagnostic Studies:     None         Medications:  Reconciled Home Medications:      Medication List      START taking these medications    cephALEXin 500 MG capsule  Commonly known as: KEFLEX  Take 2 capsules (1,000 mg total) by mouth every 8 (eight) hours. for 10 days        CHANGE how you take these medications    lisinopriL 40 MG tablet  Commonly known as: PRINIVIL,ZESTRIL  Take 1 tablet (40 mg total) by mouth once daily.  What changed: additional instructions        CONTINUE taking these medications    aspirin 81 MG EC tablet  Commonly known as: ECOTRIN  Take 81 mg by mouth once daily.     fluticasone propionate 50 mcg/actuation nasal spray  Commonly known as: FLONASE  1 spray (50 mcg total) by Each Nare route once daily.     lovastatin 20 MG tablet  Commonly known as: MEVACOR  Take 1 tablet by mouth once daily     metoprolol succinate 25 MG 24 hr tablet  Commonly known as: TOPROL-XL  Take 1 tablet (25 mg total) by mouth once daily.     nitroGLYCERIN 0.4 MG SL tablet  Commonly known as: NITROSTAT  Place 1 tablet (0.4 mg total)  under the tongue every 5 (five) minutes as needed for Chest pain.     spironolactone 25 MG tablet  Commonly known as: ALDACTONE  Take 1 tablet (25 mg total) by mouth once daily.     SUBOXONE 8-2 mg Film  Generic drug: buprenorphine-naloxone  Place under the tongue once daily.        STOP taking these medications    DUKE'S SOLUTION (BENADRYL 30 ML, MYLANTA 30 ML, LIDOCAINE 30 ML, NYSTATIN 30 ML)     DULoxetine 30 MG capsule  Commonly known as: CYMBALTA     fenofibrate 160 MG Tab     furosemide 20 MG tablet  Commonly known as: LASIX     hydrOXYzine pamoate 25 MG Cap  Commonly known as: VISTARIL            Indwelling Lines/Drains at time of discharge:   Lines/Drains/Airways     None                 Time spent on the discharge of patient: 32 minutes  Patient was seen and examined on the date of discharge and determined to be suitable for discharge.         Marin Suero MD  Department of Hospital Medicine  [facility]

## 2020-08-05 NOTE — PLAN OF CARE
08/05/20 0846   Final Note   Assessment Type Final Discharge Note   Anticipated Discharge Disposition Home   Post-Acute Status   Post-Acute Authorization Other   Other Status No Post-Acute Service Needs   Discharge Delays None known at this time

## 2020-08-05 NOTE — CARE UPDATE
08/04/20 1924   Patient Assessment/Suction   Level of Consciousness (AVPU) alert   Respiratory Effort Normal;Unlabored   Expansion/Accessory Muscles/Retractions no retractions   All Lung Fields Breath Sounds Anterior:;Posterior:;wheezes, inspiratory   BLU Breath Sounds clear   LLL Breath Sounds clear   RUL Breath Sounds wheezes, inspiratory;diminished   RML Breath Sounds wheezes, inspiratory;diminished   RLL Breath Sounds wheezes, inspiratory;diminished   Rhythm/Pattern, Respiratory unlabored;depth regular;pattern regular   Cough Frequency infrequent   Cough Type dry;nonproductive   PRE-TX-O2   O2 Device (Oxygen Therapy) room air   SpO2 (!) 91 %   Pulse Oximetry Type Intermittent   $ Pulse Oximetry - Multiple Charge Pulse Oximetry - Multiple   Pulse (!) 59   Resp 16   Positioning   Body Position sitting up in bed   Head of Bed (HOB) HOB at 30-45 degrees   Aerosol Therapy   $ Aerosol Therapy Charges Aerosol Treatment   Daily Review of Necessity (SVN) completed   Treatment Route (SVN) mouthpiece   Patient Position (SVN) HOB elevated   Post Treatment Assessment (SVN) breath sounds improved   Signs of Intolerance (SVN) none   Breath Sounds Post-Respiratory Treatment   Throughout All Fields Post-Treatment All Fields   Respiratory Interventions   Cough And Deep Breathing done independently per patient   Respiratory Evaluation   $ Care Plan Tech Time 15 min

## 2020-08-06 LAB
BACTERIA BLD CULT: NORMAL
BACTERIA BLD CULT: NORMAL

## 2020-08-10 ENCOUNTER — OFFICE VISIT (OUTPATIENT)
Dept: FAMILY MEDICINE | Facility: CLINIC | Age: 60
End: 2020-08-10
Payer: MEDICAID

## 2020-08-10 VITALS
HEIGHT: 66 IN | BODY MASS INDEX: 27.77 KG/M2 | SYSTOLIC BLOOD PRESSURE: 118 MMHG | RESPIRATION RATE: 16 BRPM | TEMPERATURE: 99 F | WEIGHT: 172.81 LBS | DIASTOLIC BLOOD PRESSURE: 68 MMHG | HEART RATE: 62 BPM | OXYGEN SATURATION: 96 %

## 2020-08-10 DIAGNOSIS — I25.10 CORONARY ARTERY DISEASE, ANGINA PRESENCE UNSPECIFIED, UNSPECIFIED VESSEL OR LESION TYPE, UNSPECIFIED WHETHER NATIVE OR TRANSPLANTED HEART: Primary | ICD-10-CM

## 2020-08-10 DIAGNOSIS — D72.829 LEUKOCYTOSIS, UNSPECIFIED TYPE: ICD-10-CM

## 2020-08-10 DIAGNOSIS — E78.5 HYPERLIPIDEMIA WITH TARGET LOW DENSITY LIPOPROTEIN (LDL) CHOLESTEROL LESS THAN 100 MG/DL: ICD-10-CM

## 2020-08-10 DIAGNOSIS — F17.200 SMOKER: ICD-10-CM

## 2020-08-10 DIAGNOSIS — I10 ESSENTIAL HYPERTENSION: ICD-10-CM

## 2020-08-10 DIAGNOSIS — I25.10 CORONARY ARTERY DISEASE INVOLVING NATIVE CORONARY ARTERY OF NATIVE HEART WITHOUT ANGINA PECTORIS: ICD-10-CM

## 2020-08-10 DIAGNOSIS — I50.22 CHRONIC SYSTOLIC CONGESTIVE HEART FAILURE: ICD-10-CM

## 2020-08-10 DIAGNOSIS — R73.9 HYPERGLYCEMIA: ICD-10-CM

## 2020-08-10 PROCEDURE — 99214 OFFICE O/P EST MOD 30 MIN: CPT | Mod: S$GLB,,, | Performed by: NURSE PRACTITIONER

## 2020-08-10 PROCEDURE — 99214 PR OFFICE/OUTPT VISIT, EST, LEVL IV, 30-39 MIN: ICD-10-PCS | Mod: S$GLB,,, | Performed by: NURSE PRACTITIONER

## 2020-08-10 RX ORDER — SPIRONOLACTONE 25 MG/1
25 TABLET ORAL DAILY
Qty: 90 TABLET | Refills: 3 | Status: SHIPPED | OUTPATIENT
Start: 2020-08-10 | End: 2020-09-18

## 2020-08-10 RX ORDER — LISINOPRIL 40 MG/1
40 TABLET ORAL DAILY
Qty: 90 TABLET | Refills: 1 | Status: SHIPPED | OUTPATIENT
Start: 2020-08-10 | End: 2020-09-18 | Stop reason: ALTCHOICE

## 2020-08-10 RX ORDER — NITROGLYCERIN 0.4 MG/1
0.4 TABLET SUBLINGUAL EVERY 5 MIN PRN
Qty: 20 TABLET | Refills: 1 | Status: SHIPPED | OUTPATIENT
Start: 2020-08-10 | End: 2022-07-11 | Stop reason: SDUPTHER

## 2020-08-10 RX ORDER — METOPROLOL SUCCINATE 25 MG/1
25 TABLET, EXTENDED RELEASE ORAL DAILY
Qty: 90 TABLET | Refills: 3 | Status: SHIPPED | OUTPATIENT
Start: 2020-08-10 | End: 2022-07-11

## 2020-08-10 RX ORDER — LOVASTATIN 20 MG/1
20 TABLET ORAL NIGHTLY
Qty: 90 TABLET | Refills: 1 | Status: SHIPPED | OUTPATIENT
Start: 2020-08-10 | End: 2020-09-04

## 2020-08-10 NOTE — PROGRESS NOTES
Subjective:       Patient ID: Kal Dodd is a 60 y.o. male.    Chief Complaint: hospital f/u  This is an established patient of Dr. Sharma, but he is new to me. He was very short of breath last week, then collapsed. Was transported to hospital by EMS in respiratory failure. He takes suboxone, but has also been getting valium from a family member. He did not know that the interaction between the suboxone and benzodiazepines can be respiratory failure and death. He was intubated and had an episode of vomiting while on ventilator and believes he had aspiration pneumonia. He has long h/o CAD with stenting, has not followed up with cardiology, also is concerned that he has fluid on the lungs. Was told to contact Dr. Perez, but has not made appt. Yet. He would prefer to go to [facility] cardiologist in [place].     Blood sugar was very high in hospital. He has not had an A1C in 3 years, last time he had one, it showed pre-diabetes. He is currently weaning down steroids, will wait to get labs until he is off them.     He does not sleep well, has trouble falling asleep and was using valium for that. He was given antidepressant from Dr. Sharma, but does not like having to take medication daily, only wants to take it when he can't sleep. He was given melatonin in hospital and felt it helped. He thinks he has some at home and would like to continue using that for now.     Takes suboxone. States he only uses 1/4 a day and would like to get off of it altogether, but does like the fact that it cuts his cravings for opioids.     HPI  Review of Systems   Constitutional: Negative for fatigue, fever and unexpected weight change.   HENT: Negative for nasal congestion, hearing loss and sore throat.    Eyes: Negative for pain, redness and visual disturbance.   Respiratory: Negative for cough and shortness of breath.    Cardiovascular: Negative for chest pain and leg swelling.   Gastrointestinal: Negative for constipation, diarrhea,  nausea and vomiting.   Endocrine: Negative for cold intolerance and heat intolerance.   Genitourinary: Negative for dysuria and hematuria.   Musculoskeletal: Negative for arthralgias and myalgias.   Integumentary:  Negative for pallor and rash.   Neurological: Negative for dizziness and headaches.   Psychiatric/Behavioral: Positive for sleep disturbance. Negative for dysphoric mood. The patient is not nervous/anxious.          Objective:     X-Ray Chest AP Portable  Order: [id number]  Status:  Final result   Visible to patient:  No (inaccessible in Patient Portal) Next appt:  None Dx:  Respiratory arrest  Details    Reading Physician Reading Date Result Priority   Douglas Eubanks MD  [phone] 8/1/2020 STAT      Narrative & Impression     EXAMINATION:  XR CHEST AP PORTABLE     CLINICAL HISTORY:  Coronary artery disease, previous myocardial infarction, respiratory arrest     FINDINGS:  Portable chest radiograph at 05:58 hours compared to 03/07/2017 shows ET tube at the level of T5, within 2 cm of the tawanda, with nasogastric tube with distal portion beyond the inferior margin of the radiograph, at least to the level of the gastroesophageal junction.  The cardiac silhouette is upper limits of normal in size, with the pulmonary vasculature prominent centrally.  There are changes of prior coronary endovascular stenting.     The lungs are symmetrically expanded, with diffuse reticulonodular densities throughout both lungs suggesting interstitial pulmonary edema.  There is no large pleural effusion or pneumothorax.     Impression:     1. Support devices in position as above.  2. Diffuse interstitial pulmonary edema.        Electronically signed by: Douglas Eubanks MD  Date:                                            08/01/2020  Time:                                           06:48           X-Ray Chest AP Portable  Order: [id number]  Status:  Final result   Visible to patient:  No (inaccessible in Patient Portal) Next appt:   None  Details    Reading Physician Reading Date Result Priority   Keven Guerrier MD  [phone] 8/2/2020       Narrative & Impression     CLINICAL HISTORY:  60 years (1960) Male chf resp arrest     TECHNIQUE:  Portable AP radiograph the chest.     COMPARISON:  Most recent radiograph from August 1, 2020.     FINDINGS:  The lungs are clear, except for some vascular crowding bilaterally,  likely related to a suboptimal inspiration.  Costophrenic angles are  seen without effusion. No pneumothorax is identified. The heart is  mildly enlarged. The mediastinum is within normal limits. Osseous  structures appear within normal limits. The visualized upper abdomen  is unremarkable.     Lines and tubes: Endotracheal tube with tip approximately 3 cm from  the tawanda. There is an enteric tube with tip at the GE junction.     IMPRESSION:  Mildly improved central hilar predominant interstitial opacities  suggesting improving pulmonary edema.                          .                 Electronically Signed by RAHUL Wright on 8/2/2020 6:21 AM            Specimen Collected: 08/02/20 05:25           CT Head Without Contrast  Order: [id number]  Status:  Final result   Visible to patient:  No (inaccessible in Patient Portal) Next appt:  None  Details    Reading Physician Reading Date Result Priority   Douglas Eubanks MD  [phone] 8/1/2020 STAT      Narrative & Impression     EXAMINATION:  CT HEAD WITHOUT CONTRAST     CLINICAL HISTORY:  Altered mental status; respiratory arrest.     TECHNIQUE:  CMS MANDATED QUALITY DATA-CT RADIATION DOSE-436     All CT scans at this facility dose modulation, iterative reconstruction, and or weight-based dosing when appropriate to reduce radiation dose to as low as reasonably achievable.     FINDINGS:  No prior studies for comparison.  There is no acute intracranial hemorrhage, with no mass effect or abnormal extra-axial fluid.  Minimal scattered areas of nonspecific  hypoattenuation involve the deep periventricular white matter, with gray-white differentiation maintained.     The cortical sulci and ventricles are normal in size for age.  The cerebellum and brainstem are unremarkable.  There is paranasal sinus mucosal thickening.  The mastoid air cells are clear. There is no acute osseous abnormality.     Impression:     1. Minimal scattered areas of nonspecific white matter hypoattenuation, suggesting chronic small vessel ischemic disease.  2. No acute intracranial hemorrhage, mass effect, or loss of gray-white differentiation.        Electronically signed by: Douglas Eubanks MD  Date:                                            08/01/2020  Time:                                           09:20           Lab Results   Component Value Date    WBC 16.25 (H) 08/05/2020    WBC 16.25 (H) 08/05/2020    HGB 15.4 08/05/2020    HGB 15.4 08/05/2020    HCT 46.8 08/05/2020    HCT 46.8 08/05/2020    MCV 87 08/05/2020    MCV 87 08/05/2020     08/05/2020     08/05/2020       CMP  Sodium   Date Value Ref Range Status   08/05/2020 139 136 - 145 mmol/L Final   08/05/2020 139 136 - 145 mmol/L Final     Potassium   Date Value Ref Range Status   08/05/2020 3.9 3.5 - 5.1 mmol/L Final   08/05/2020 3.9 3.5 - 5.1 mmol/L Final     Chloride   Date Value Ref Range Status   08/05/2020 95 95 - 110 mmol/L Final   08/05/2020 95 95 - 110 mmol/L Final     CO2   Date Value Ref Range Status   08/05/2020 32 (H) 23 - 29 mmol/L Final   08/05/2020 32 (H) 23 - 29 mmol/L Final     Glucose   Date Value Ref Range Status   08/05/2020 181 (H) 70 - 110 mg/dL Final   08/05/2020 181 (H) 70 - 110 mg/dL Final     BUN, Bld   Date Value Ref Range Status   08/05/2020 36 (H) 8 - 23 mg/dL Final   08/05/2020 36 (H) 8 - 23 mg/dL Final     Creatinine   Date Value Ref Range Status   08/05/2020 0.7 0.5 - 1.4 mg/dL Final   08/05/2020 0.7 0.5 - 1.4 mg/dL Final     Calcium   Date Value Ref Range Status   08/05/2020 9.1 8.7 - 10.5  mg/dL Final   08/05/2020 9.1 8.7 - 10.5 mg/dL Final     Total Protein   Date Value Ref Range Status   08/05/2020 7.0 6.0 - 8.4 g/dL Final     Albumin   Date Value Ref Range Status   08/05/2020 4.1 3.5 - 5.2 g/dL Final     Total Bilirubin   Date Value Ref Range Status   08/05/2020 0.8 0.1 - 1.0 mg/dL Final     Comment:     For infants and newborns, interpretation of results should be based  on gestational age, weight and in agreement with clinical  observations.  Premature Infant recommended reference ranges:  Up to 24 hours.............<8.0 mg/dL  Up to 48 hours............<12.0 mg/dL  3-5 days..................<15.0 mg/dL  6-29 days.................<15.0 mg/dL       Alkaline Phosphatase   Date Value Ref Range Status   08/05/2020 39 (L) 55 - 135 U/L Final     AST   Date Value Ref Range Status   08/05/2020 15 10 - 40 U/L Final     ALT   Date Value Ref Range Status   08/05/2020 24 10 - 44 U/L Final     Anion Gap   Date Value Ref Range Status   08/05/2020 12 8 - 16 mmol/L Final   08/05/2020 12 8 - 16 mmol/L Final     eGFR if    Date Value Ref Range Status   08/05/2020 >60.0 >60 mL/min/1.73 m^2 Final   08/05/2020 >60.0 >60 mL/min/1.73 m^2 Final     eGFR if non    Date Value Ref Range Status   08/05/2020 >60.0 >60 mL/min/1.73 m^2 Final     Comment:     Calculation used to obtain the estimated glomerular filtration  rate (eGFR) is the CKD-EPI equation.      08/05/2020 >60.0 >60 mL/min/1.73 m^2 Final     Comment:     Calculation used to obtain the estimated glomerular filtration  rate (eGFR) is the CKD-EPI equation.        Lab Results   Component Value Date    HGBA1C 6.2 05/18/2017         Physical Exam  Vitals signs and nursing note reviewed.   Constitutional:       General: He is not in acute distress.     Appearance: Normal appearance. He is well-developed. He is not diaphoretic.   HENT:      Head: Normocephalic and atraumatic.   Eyes:      General: No scleral icterus.        Right eye:  No discharge.         Left eye: No discharge.      Conjunctiva/sclera: Conjunctivae normal.   Cardiovascular:      Rate and Rhythm: Normal rate and regular rhythm.      Heart sounds: Normal heart sounds. No murmur. No friction rub. No gallop.    Pulmonary:      Effort: Pulmonary effort is normal. No respiratory distress.      Breath sounds: Normal breath sounds. No stridor. No wheezing, rhonchi or rales.   Musculoskeletal:         General: No swelling.   Skin:     General: Skin is warm and dry.      Capillary Refill: Capillary refill takes less than 2 seconds.      Coloration: Skin is not jaundiced or pale.      Findings: No rash.   Neurological:      Mental Status: He is alert and oriented to person, place, and time.   Psychiatric:         Mood and Affect: Mood normal.         Behavior: Behavior normal.         Assessment:       1. Coronary artery disease, angina presence unspecified, unspecified vessel or lesion type, unspecified whether native or transplanted heart    2. Smoker    3. Leukocytosis, unspecified type    4. Essential hypertension    5. Hyperglycemia        Plan:       Coronary artery disease, angina presence unspecified, unspecified vessel or lesion type, unspecified whether native or transplanted heart  -     Ambulatory referral/consult to Cardiology; Future; Expected date: 08/17/2020    Smoker  -     Ambulatory referral/consult to Smoking Cessation Program; Future; Expected date: 08/17/2020    Leukocytosis, unspecified type  -     CBC auto differential; Future; Expected date: 08/10/2020    Essential hypertension  -     Comprehensive metabolic panel; Future; Expected date: 08/10/2020  -     Lipid Panel; Future; Expected date: 08/10/2020  -     Microalbumin/creatinine urine ratio; Future; Expected date: 08/10/2020  -     TSH; Future; Expected date: 08/10/2020    Hyperglycemia  -     Hemoglobin A1C; Future; Expected date: 08/10/2020    Do not take any benzodiazepines as the interaction with suboxone  (and any opioid) can cause you to stop breathing. May use melatonin for sleep. Take your lovastatin as early as supper or as late as bedtime. Continue cephalexin (2 -500 mg. Tabs) as soon as you get up, mid-day and before you go to bed (if you can't take them every 8 hours).  Labs in 1 month follow up with Dr. Sharma

## 2020-08-14 ENCOUNTER — PATIENT OUTREACH (OUTPATIENT)
Dept: ADMINISTRATIVE | Facility: OTHER | Age: 60
End: 2020-08-14

## 2020-08-14 NOTE — PROGRESS NOTES
Chart was reviewed for overdue Proactive [facility] Encounters (MARCIA)  topics  Updates were unable to be requested from care everywhere  Health Maintenance unable to be updated  [facility] immunization registry triggered  [facility] outreach/ mychart is not active/ no answer

## 2020-08-26 ENCOUNTER — TELEPHONE (OUTPATIENT)
Dept: FAMILY MEDICINE | Facility: CLINIC | Age: 60
End: 2020-08-26

## 2020-08-26 NOTE — TELEPHONE ENCOUNTER
----- Message from Brad Resendiz sent at 8/26/2020  1:37 PM CDT -----  Contact: pt  Type: Needs Medical Advice  Who Called:  pt  Symptoms: dizzy and lightheaded when standing up  Duration of symptoms: yesterday  Pharmacy name and phone #:    [id number] - [place] - [address]  [place] [address]  Phone: [phone] Fax: [phone]    Best Call Back Number: [phone]    Additional Information: pt adjusted his BP meds and feeling better but wants to talk to someone to discuss complete changes. Pt has been running normal range on BP since he adjusted his meds. Please call to advise

## 2020-08-27 ENCOUNTER — TELEPHONE (OUTPATIENT)
Dept: FAMILY MEDICINE | Facility: CLINIC | Age: 60
End: 2020-08-27

## 2020-08-27 NOTE — TELEPHONE ENCOUNTER
----- Message from Shira Washington sent at 8/27/2020  2:53 PM CDT -----  Contact: pt  Pt states that he is returning the nurse phone call. Pt states had called yesterday BP low and he is lightheaded was thinking need to change BP med. Looks like nurse Janis called the pt.[phone] where he can be reached at,waiting on the call.

## 2020-08-27 NOTE — TELEPHONE ENCOUNTER
----- Message from Shira Washington sent at 8/27/2020  2:53 PM CDT -----  Contact: pt  Pt states that he is returning the nurse phone call. Pt states had called yesterday BP low and he is lightheaded was thinking need to change BP med. Looks like nurse Janis called the pt.[phone] where he can be reached at,waiting on the call.

## 2020-09-03 ENCOUNTER — LAB VISIT (OUTPATIENT)
Dept: LAB | Facility: HOSPITAL | Age: 60
End: 2020-09-03
Attending: NURSE PRACTITIONER
Payer: MEDICAID

## 2020-09-03 DIAGNOSIS — I10 ESSENTIAL HYPERTENSION: ICD-10-CM

## 2020-09-03 DIAGNOSIS — D72.829 LEUKOCYTOSIS, UNSPECIFIED TYPE: ICD-10-CM

## 2020-09-03 DIAGNOSIS — R73.9 HYPERGLYCEMIA: ICD-10-CM

## 2020-09-03 LAB
ALBUMIN SERPL BCP-MCNC: 4.2 G/DL (ref 3.5–5.2)
ALP SERPL-CCNC: 54 U/L (ref 55–135)
ALT SERPL W/O P-5'-P-CCNC: 20 U/L (ref 10–44)
ANION GAP SERPL CALC-SCNC: 9 MMOL/L (ref 8–16)
AST SERPL-CCNC: 16 U/L (ref 10–40)
BASOPHILS # BLD AUTO: 0.05 K/UL (ref 0–0.2)
BASOPHILS NFR BLD: 0.6 % (ref 0–1.9)
BILIRUB SERPL-MCNC: 0.3 MG/DL (ref 0.1–1)
BUN SERPL-MCNC: 17 MG/DL (ref 8–23)
CALCIUM SERPL-MCNC: 9.8 MG/DL (ref 8.7–10.5)
CHLORIDE SERPL-SCNC: 103 MMOL/L (ref 95–110)
CHOLEST SERPL-MCNC: 241 MG/DL (ref 120–199)
CHOLEST/HDLC SERPL: 8.3 {RATIO} (ref 2–5)
CO2 SERPL-SCNC: 27 MMOL/L (ref 23–29)
CREAT SERPL-MCNC: 0.9 MG/DL (ref 0.5–1.4)
DIFFERENTIAL METHOD: ABNORMAL
EOSINOPHIL # BLD AUTO: 0.6 K/UL (ref 0–0.5)
EOSINOPHIL NFR BLD: 6.9 % (ref 0–8)
ERYTHROCYTE [DISTWIDTH] IN BLOOD BY AUTOMATED COUNT: 13.3 % (ref 11.5–14.5)
EST. GFR  (AFRICAN AMERICAN): >60 ML/MIN/1.73 M^2
EST. GFR  (NON AFRICAN AMERICAN): >60 ML/MIN/1.73 M^2
ESTIMATED AVG GLUCOSE: 140 MG/DL (ref 68–131)
GLUCOSE SERPL-MCNC: 150 MG/DL (ref 70–110)
HBA1C MFR BLD HPLC: 6.5 % (ref 4–5.6)
HCT VFR BLD AUTO: 47.7 % (ref 40–54)
HDLC SERPL-MCNC: 29 MG/DL (ref 40–75)
HDLC SERPL: 12 % (ref 20–50)
HGB BLD-MCNC: 15.1 G/DL (ref 14–18)
IMM GRANULOCYTES # BLD AUTO: 0.02 K/UL (ref 0–0.04)
IMM GRANULOCYTES NFR BLD AUTO: 0.2 % (ref 0–0.5)
LDLC SERPL CALC-MCNC: ABNORMAL MG/DL (ref 63–159)
LYMPHOCYTES # BLD AUTO: 3.4 K/UL (ref 1–4.8)
LYMPHOCYTES NFR BLD: 40.5 % (ref 18–48)
MCH RBC QN AUTO: 28.9 PG (ref 27–31)
MCHC RBC AUTO-ENTMCNC: 31.7 G/DL (ref 32–36)
MCV RBC AUTO: 91 FL (ref 82–98)
MONOCYTES # BLD AUTO: 0.8 K/UL (ref 0.3–1)
MONOCYTES NFR BLD: 8.8 % (ref 4–15)
NEUTROPHILS # BLD AUTO: 3.6 K/UL (ref 1.8–7.7)
NEUTROPHILS NFR BLD: 43 % (ref 38–73)
NONHDLC SERPL-MCNC: 212 MG/DL
NRBC BLD-RTO: 0 /100 WBC
PLATELET # BLD AUTO: 343 K/UL (ref 150–350)
PMV BLD AUTO: 9.6 FL (ref 9.2–12.9)
POTASSIUM SERPL-SCNC: 4.8 MMOL/L (ref 3.5–5.1)
PROT SERPL-MCNC: 7.5 G/DL (ref 6–8.4)
RBC # BLD AUTO: 5.23 M/UL (ref 4.6–6.2)
SODIUM SERPL-SCNC: 139 MMOL/L (ref 136–145)
TRIGL SERPL-MCNC: 510 MG/DL (ref 30–150)
TSH SERPL DL<=0.005 MIU/L-ACNC: 1.95 UIU/ML (ref 0.4–4)
WBC # BLD AUTO: 8.49 K/UL (ref 3.9–12.7)

## 2020-09-03 PROCEDURE — 36415 COLL VENOUS BLD VENIPUNCTURE: CPT | Mod: PO

## 2020-09-03 PROCEDURE — 84443 ASSAY THYROID STIM HORMONE: CPT

## 2020-09-03 PROCEDURE — 83036 HEMOGLOBIN GLYCOSYLATED A1C: CPT

## 2020-09-03 PROCEDURE — 80053 COMPREHEN METABOLIC PANEL: CPT

## 2020-09-03 PROCEDURE — 80061 LIPID PANEL: CPT

## 2020-09-03 PROCEDURE — 85025 COMPLETE CBC W/AUTO DIFF WBC: CPT

## 2020-09-04 ENCOUNTER — OFFICE VISIT (OUTPATIENT)
Dept: FAMILY MEDICINE | Facility: CLINIC | Age: 60
End: 2020-09-04
Payer: MEDICAID

## 2020-09-04 VITALS
TEMPERATURE: 98 F | HEIGHT: 66 IN | HEART RATE: 61 BPM | SYSTOLIC BLOOD PRESSURE: 138 MMHG | DIASTOLIC BLOOD PRESSURE: 80 MMHG | BODY MASS INDEX: 28.56 KG/M2 | WEIGHT: 177.69 LBS | RESPIRATION RATE: 16 BRPM | OXYGEN SATURATION: 96 %

## 2020-09-04 DIAGNOSIS — I15.2 HYPERTENSION ASSOCIATED WITH DIABETES: ICD-10-CM

## 2020-09-04 DIAGNOSIS — E78.2 MIXED HYPERLIPIDEMIA: ICD-10-CM

## 2020-09-04 DIAGNOSIS — E11.9 NEW ONSET TYPE 2 DIABETES MELLITUS: Primary | ICD-10-CM

## 2020-09-04 DIAGNOSIS — E11.59 HYPERTENSION ASSOCIATED WITH DIABETES: ICD-10-CM

## 2020-09-04 PROCEDURE — 99214 OFFICE O/P EST MOD 30 MIN: CPT | Mod: S$GLB,,, | Performed by: INTERNAL MEDICINE

## 2020-09-04 PROCEDURE — 99214 PR OFFICE/OUTPT VISIT, EST, LEVL IV, 30-39 MIN: ICD-10-PCS | Mod: S$GLB,,, | Performed by: INTERNAL MEDICINE

## 2020-09-04 RX ORDER — FENOFIBRATE 160 MG/1
160 TABLET ORAL DAILY
Qty: 90 TABLET | Refills: 3 | Status: SHIPPED | OUTPATIENT
Start: 2020-09-04 | End: 2022-07-11

## 2020-09-04 RX ORDER — LISINOPRIL 10 MG/1
10 TABLET ORAL DAILY
Qty: 90 TABLET | Refills: 3 | Status: SHIPPED | OUTPATIENT
Start: 2020-09-04 | End: 2021-08-27

## 2020-09-04 RX ORDER — METFORMIN HYDROCHLORIDE 500 MG/1
500 TABLET ORAL 2 TIMES DAILY WITH MEALS
Qty: 60 TABLET | Refills: 3 | Status: SHIPPED | OUTPATIENT
Start: 2020-09-04 | End: 2022-07-11

## 2020-09-04 RX ORDER — ATORVASTATIN CALCIUM 40 MG/1
40 TABLET, FILM COATED ORAL DAILY
Qty: 90 TABLET | Refills: 3 | Status: SHIPPED | OUTPATIENT
Start: 2020-09-04 | End: 2022-07-11 | Stop reason: SDUPTHER

## 2020-09-04 NOTE — PROGRESS NOTES
Subjective:      2:57 PM     Patient ID: Kal Dodd is a 60 y.o. male.    Chief Complaint: Hypertension (discuss medication)    HPI         CHIEF COMPLAINT: Diabetes.  HPI:     ONSET/TIMING:  New onset    QUALITY/COURSE:   unchanged..      INTENSITY/SEVERITY: . Measures blood sugar : 0  CONTEXT/WHEN: last HgbA1c    Lab Results   Component Value Date    HGBA1C 6.5 (H) 09/03/2020      weights:    Wt Readings from Last 1 Encounters:   09/04/20 1432 80.6 kg (177 lb 11.1 oz)         SYMPTOMS/RELATED: . . Possible medication side effects include:     The following symptoms/statements  are present IF IN BOLD, negative otherwise.         MODIFIERS/TREATMENTS: Not_taking_medications:  .  Non-compliance_with_Diabetic_diet  .  No_eye_exam_within_last_year.  Not_practicing_good_foot_care.    REVIEW OF SYMPTOMS: . Weight_gain. Weight_loss. Neuropathy. Recurrent_infections. Skin_ulcers        CHIEF COMPLAINT: Hypertension  HPI:  Stop lisinopril 40 because blood pressure dropped to 60 systolic and he was dizzy.    ONSET:      QUALITY/COURSE:   Unchanged.     INTENSITY/SEVERITY:  Average blood pressure is unknown.      MODIFIERS/TREATMENTS:  Taking medications: yes. .High sodium intake: no. alcohol: no      The following symptoms are positive only if BOLDED, otherwise are negative.      SYMPTOMS/RELATED: Possible medication side effects include:   Depression..  . Cough. . Constipation.    REVIEW OF SYMPTOMS: . Weight_loss . Weight_gain . Leg_cramps .Potency_problems .    TARGET ORGAN DAMAGE:: angina/ prior myocardial infarction, chronic kidney disease, heart failure, left ventricular hypertrophy, peripheral artery disease, prior coronary revascularization, retinopathy, stroke. transient ischemic attack.    Patient was in the hospital with cardiac arrest 1 month ago.  He also had problems with pulmonary edema          CHIEF COMPLAINT: Hyperlipidemia. cholesterol screening: no.   HPI:     ONSET:    MODIFIERS/TREATMENTS: .  "Taking medications: yes. . Non-compliance with following diet: no. .     SYMPTOMS/RELATED:Possible medication side effects include:   Myalgia: no.  .     REVIEW OF SYMPTOMS: past weights:   Wt Readings from Last 1 Encounters:   09/04/20 1432 80.6 kg (177 lb 11.1 oz)                                                     Last lipids: total   Lab Results   Component Value Date    CHOL 241 (H) 09/03/2020    CHOL 257 (H) 04/23/2019    CHOL 281 (H) 05/18/2017                                                                     HDL   Lab Results   Component Value Date    HDL 29 (L) 09/03/2020    HDL 26 (L) 04/23/2019    HDL 26 (L) 05/18/2017                                                                     LDL   Lab Results   Component Value Date    LDLCALC Invalid, Trig>400.0 09/03/2020    LDLCALC Invalid, Trig>400.0 04/23/2019    LDLCALC Invalid, Trig>400.0 05/18/2017                                                                     TRIG   Lab Results   Component Value Date    TRIG 510 (H) 09/03/2020    TRIG 833 (H) 04/23/2019    TRIG 904 (H) 05/18/2017                                                                         aReview of Systems         Objective:      Vitals:    09/04/20 1432   BP: 138/80   Pulse: 61   Resp: 16   Temp: 98.1 °F (36.7 °C)   TempSrc: Oral   SpO2: 96%   Weight: 80.6 kg (177 lb 11.1 oz)   Height: 5' 6" (1.676 m)   PainSc: 0-No pain     Physical Exam  Vitals signs and nursing note reviewed.   Constitutional:       Appearance: He is well-developed.   Cardiovascular:      Rate and Rhythm: Normal rate and regular rhythm.      Heart sounds: Normal heart sounds.   Pulmonary:      Effort: Pulmonary effort is normal. No respiratory distress.      Breath sounds: Normal breath sounds. No wheezing.   Abdominal:      General: Bowel sounds are normal.      Palpations: Abdomen is soft.      Tenderness: There is no abdominal tenderness.   Musculoskeletal:      Right foot: Normal range of motion. No " deformity.      Left foot: Normal range of motion. No deformity.   Feet:      Right foot:      Protective Sensation: 5 sites tested.      Skin integrity: No ulcer, blister, skin breakdown, erythema, warmth, callus or dry skin.      Left foot:      Protective Sensation: 5 sites tested.      Skin integrity: No ulcer, blister, skin breakdown, erythema, warmth, callus or dry skin.   Neurological:      Mental Status: He is alert.   Psychiatric:         Behavior: Behavior normal.         Thought Content: Thought content normal.       Recent Results (from the past 1008 hour(s))   COVID-19 Rapid Screening    Collection Time: 08/01/20  5:46 AM   Result Value Ref Range    SARS-CoV-2 RNA, Amplification, Qual Negative Negative   CBC auto differential    Collection Time: 08/01/20  5:58 AM   Result Value Ref Range    WBC 11.77 3.90 - 12.70 K/uL    RBC 5.40 4.60 - 6.20 M/uL    Hemoglobin 15.6 14.0 - 18.0 g/dL    Hematocrit 49.0 40.0 - 54.0 %    Mean Corpuscular Volume 91 82 - 98 fL    Mean Corpuscular Hemoglobin 28.9 27.0 - 31.0 pg    Mean Corpuscular Hemoglobin Conc 31.8 (L) 32.0 - 36.0 g/dL    RDW 13.2 11.5 - 14.5 %    Platelets 321 150 - 350 K/uL    MPV 9.0 (L) 9.2 - 12.9 fL    Immature Granulocytes CANCELED 0.0 - 0.5 %    Immature Grans (Abs) CANCELED 0.00 - 0.04 K/uL    nRBC 0 0 /100 WBC    Gran% 31.0 (L) 38.0 - 73.0 %    Lymph% 67.0 (H) 18.0 - 48.0 %    Mono% 0.0 (L) 4.0 - 15.0 %    Eosinophil% 2.0 0.0 - 8.0 %    Basophil% 0.0 0.0 - 1.9 %    Differential Method Manual    Comprehensive metabolic panel    Collection Time: 08/01/20  5:58 AM   Result Value Ref Range    Sodium 139 136 - 145 mmol/L    Potassium 3.6 3.5 - 5.1 mmol/L    Chloride 102 95 - 110 mmol/L    CO2 23 23 - 29 mmol/L    Glucose 230 (H) 70 - 110 mg/dL    BUN, Bld 17 8 - 23 mg/dL    Creatinine 1.1 0.5 - 1.4 mg/dL    Calcium 8.6 (L) 8.7 - 10.5 mg/dL    Total Protein 7.0 6.0 - 8.4 g/dL    Albumin 4.0 3.5 - 5.2 g/dL    Total Bilirubin 0.7 0.1 - 1.0 mg/dL     Alkaline Phosphatase 60 55 - 135 U/L     (H) 10 - 40 U/L    ALT 68 (H) 10 - 44 U/L    Anion Gap 14 8 - 16 mmol/L    eGFR if African American >60.0 >60 mL/min/1.73 m^2    eGFR if non African American >60.0 >60 mL/min/1.73 m^2   Brain natriuretic peptide    Collection Time: 08/01/20  5:58 AM   Result Value Ref Range     (H) 0 - 99 pg/mL   Troponin I    Collection Time: 08/01/20  5:58 AM   Result Value Ref Range    Troponin I 0.036 <=0.040 ng/mL   Protime-INR    Collection Time: 08/01/20  5:58 AM   Result Value Ref Range    PT 13.0 10.6 - 14.8 sec    INR 1.0    Magnesium    Collection Time: 08/01/20  5:58 AM   Result Value Ref Range    Magnesium 2.0 1.6 - 2.6 mg/dL   Urinalysis, Reflex to Urine Culture Urine, Clean Catch    Collection Time: 08/01/20  6:02 AM    Specimen: Urine   Result Value Ref Range    Specimen UA Urine, Clean Catch     Color, UA Yellow Yellow, Straw, Maritza    Appearance, UA Clear Clear    pH, UA 8.0 5.0 - 8.0    Specific Gravity, UA 1.020 1.005 - 1.030    Protein, UA 1+ (A) Negative    Glucose, UA Negative Negative    Ketones, UA Negative Negative    Bilirubin (UA) Negative Negative    Occult Blood UA Negative Negative    Nitrite, UA Negative Negative    Urobilinogen, UA Negative Negative EU/dL    Leukocytes, UA Negative Negative   Urinalysis Microscopic    Collection Time: 08/01/20  6:02 AM   Result Value Ref Range    RBC, UA 1 0 - 4 /hpf    WBC, UA 2 0 - 5 /hpf    Bacteria Negative None-Occ /hpf    Squam Epithel, UA 3 /hpf    Hyaline Casts, UA 14 (A) 0-1/lpf /lpf    Microscopic Comment SEE COMMENT    Drug screen panel, emergency    Collection Time: 08/01/20  6:03 AM   Result Value Ref Range    Benzodiazepines Presumptive Positive     Cocaine (Metab.) Negative     Opiate Scrn, Ur Negative     Barbiturate Screen, Ur Negative     Amphetamine Screen, Ur Negative     THC Presumptive Positive     Phencyclidine Negative     Creatinine, Random Ur 106.0 23.0 - 375.0 mg/dL    Toxicology  Information SEE COMMENT    Lactic acid, plasma    Collection Time: 08/01/20  6:10 AM   Result Value Ref Range    Lactate (Lactic Acid) 3.4 (HH) 0.5 - 1.9 mmol/L   Blood Culture #1 **CANNOT BE ORDERED STAT**    Collection Time: 08/01/20  6:15 AM    Specimen: Peripheral, Forearm, Right; Blood   Result Value Ref Range    Blood Culture, Routine No growth after 5 days.    Blood Culture #2 **CANNOT BE ORDERED STAT**    Collection Time: 08/01/20  6:16 AM    Specimen: Peripheral, Upper Arm, Right; Blood   Result Value Ref Range    Blood Culture, Routine No growth after 5 days.    C-reactive protein    Collection Time: 08/01/20  6:16 AM   Result Value Ref Range    CRP 0.43 <0.76 mg/dL   Ferritin    Collection Time: 08/01/20  6:16 AM   Result Value Ref Range    Ferritin 503 (H) 20.0 - 300.0 ng/mL   Procalcitonin    Collection Time: 08/01/20  6:16 AM   Result Value Ref Range    Procalcitonin <0.05 0.00 - 0.50 ng/mL   ISTAT PROCEDURE    Collection Time: 08/01/20  6:26 AM   Result Value Ref Range    POC PH 7.147 (LL) 7.35 - 7.45    POC PCO2 88.0 (HH) 35 - 45 mmHg    POC PO2 389 (H) 80 - 100 mmHg    POC HCO3 30.5 (H) 24 - 28 mmol/L    POC BE 2 -2 to 2 mmol/L    POC SATURATED O2 100 95 - 100 %    POC TCO2 33 (H) 23 - 27 mmol/L    Rate 16     Sample ARTERIAL     Site RB     Allens Test N/A     DelSys Adult Vent     Mode AC/PRVC     Vt 450     PEEP 5     FiO2 100     Sp02 100    Culture, Respiratory with Gram Stain    Collection Time: 08/01/20  6:31 AM    Specimen: Endotracheal Aspirate; Respiratory   Result Value Ref Range    Respiratory Culture Normal respiratory roshan also present     Respiratory Culture ENTEROBACTER CLOACAE  Moderate   (A)     Gram Stain (Respiratory) <10 epithelial cells per low power field.     Gram Stain (Respiratory) Few WBC's     Gram Stain (Respiratory) Few Gram positive cocci     Gram Stain (Respiratory) Rare Gram negative rods        Susceptibility    Enterobacter cloacae - CULTURE, RESPIRATORY      Ceftriaxone <=1 Sensitive mcg/mL     Ciprofloxacin <=1 Sensitive mcg/mL     Cefepime <=2 Sensitive mcg/mL     Ertapenem <=0.5 Sensitive mcg/mL     Gentamicin <=4 Sensitive mcg/mL     Levofloxacin <=2 Sensitive mcg/mL     Meropenem <=1 Sensitive mcg/mL     Piperacillin/Tazo <=16 Sensitive mcg/mL     Trimeth/Sulfa <=2/38 Sensitive mcg/mL     Tetracycline <=4 Sensitive mcg/mL     Tobramycin <=4 Sensitive mcg/mL   ISTAT PROCEDURE    Collection Time: 08/01/20  7:39 AM   Result Value Ref Range    POC PH 7.287 (LL) 7.35 - 7.45    POC PCO2 58.6 (HH) 35 - 45 mmHg    POC PO2 67 (L) 80 - 100 mmHg    POC HCO3 28.0 24 - 28 mmol/L    POC BE 1 -2 to 2 mmol/L    POC SATURATED O2 90 (L) 95 - 100 %    POC TCO2 30 (H) 23 - 27 mmol/L    Rate 22     Sample ARTERIAL     Site RR     Allens Test Pass     DelSys Adult Vent     Mode AC/PRVC     Vt 500     PEEP 5     FiO2 0.60    Culture, Respiratory with Gram Stain    Collection Time: 08/01/20  9:32 AM    Specimen: Sputum; Respiratory   Result Value Ref Range    Respiratory Culture Normal respiratory roshan     Respiratory Culture (A)      ENTEROBACTER CLOACAE  Few  For susceptibility see order #[id number]      Gram Stain (Respiratory) <10 epithelial cells per low power field.     Gram Stain (Respiratory) Few WBC's     Gram Stain (Respiratory) Rare Gram positive cocci    Lactic acid, plasma    Collection Time: 08/01/20 11:30 AM   Result Value Ref Range    Lactate (Lactic Acid) 2.3 (HH) 0.5 - 1.9 mmol/L   Troponin I    Collection Time: 08/01/20 11:30 AM   Result Value Ref Range    Troponin I 0.432 (HH) <=0.040 ng/mL   Echo Color Flow Doppler? Yes    Collection Time: 08/01/20 12:12 PM   Result Value Ref Range    BSA 1.97 m2    Right Atrial Pressure (from IVC) 3 mmHg    TDI SEPTAL 0.05 m/s    LV LATERAL E/E' RATIO 7.43 m/s    LV SEPTAL E/E' RATIO 10.40 m/s    AORTIC VALVE CUSP SEPERATION 1.76 cm    TDI LATERAL 0.07 m/s    PV PEAK VELOCITY 97.78 cm/s    LVIDD 5.66 3.5 - 6.0 cm    IVS 1.27 (A) 0.6  - 1.1 cm    PW 1.01 0.6 - 1.1 cm    Ao root annulus 3.16 cm    LVIDS 4.70 (A) 2.1 - 4.0 cm    FS 17 28 - 44 %    LV mass 266.21 g    LA size 4.24 cm    RVDD 250.00 cm    Left Ventricle Relative Wall Thickness 0.36 cm    AV mean gradient 4 mmHg    AV valve area 2.85 cm2    AV Velocity Ratio 89.26     AV index (prosthetic) 0.99     E/A ratio 0.69     Mean e' 0.06 m/s    E wave decelartion time 269.94 msec    IVRT 182.33 msec    Pulm vein S/D ratio 0.85     LVOT diameter 1.91 cm    LVOT area 2.9 cm2    LVOT peak matty 117.82 m/s    LVOT peak VTI 21.08 cm    Ao peak matty 1.32 m/s    Ao VTI 21.19 cm    LVOT stroke volume 60.37 cm3    AV peak gradient 7 mmHg    E/E' ratio 8.67 m/s    MV Peak E Matty 0.52 m/s    MV Peak A Matty 0.75 m/s    PV Peak S Matty 29.99 m/s    PV Peak D Matty 35.20 m/s    LV Systolic Volume 94.20 mL    LV Systolic Volume Index 48.9 mL/m2    LV Diastolic Volume 159.60 mL    LV Diastolic Volume Index 82.86 mL/m2    LV Mass Index 138 g/m2   POCT glucose    Collection Time: 08/01/20 12:31 PM   Result Value Ref Range    POC Glucose 143 (H) 70 - 110   Lactic acid, plasma    Collection Time: 08/01/20  4:00 PM   Result Value Ref Range    Lactate (Lactic Acid) 1.7 0.5 - 1.9 mmol/L   Troponin I    Collection Time: 08/01/20  6:03 PM   Result Value Ref Range    Troponin I 0.351 (HH) <=0.040 ng/mL   ISTAT PROCEDURE    Collection Time: 08/01/20  7:35 PM   Result Value Ref Range    POC PH 7.386 7.35 - 7.45    POC PCO2 45.0 35 - 45 mmHg    POC PO2 66 (L) 80 - 100 mmHg    POC HCO3 27.0 24 - 28 mmol/L    POC BE 2 -2 to 2 mmol/L    POC SATURATED O2 92 (L) 95 - 100 %    POC TCO2 28 (H) 23 - 27 mmol/L    Rate 18     Sample ARTERIAL     Site LB     Allens Test N/A     DelSys Adult Vent     Mode AC/PRVC     Vt 500     PEEP 5     FiO2 50    POCT glucose    Collection Time: 08/01/20  8:29 PM   Result Value Ref Range    POC Glucose 156 (H) 70 - 110   Comprehensive Metabolic Panel (CMP)    Collection Time: 08/02/20  3:36 AM   Result  Value Ref Range    Sodium 139 136 - 145 mmol/L    Potassium 3.8 3.5 - 5.1 mmol/L    Chloride 101 95 - 110 mmol/L    CO2 24 23 - 29 mmol/L    Glucose 173 (H) 70 - 110 mg/dL    BUN, Bld 23 8 - 23 mg/dL    Creatinine 0.9 0.5 - 1.4 mg/dL    Calcium 8.8 8.7 - 10.5 mg/dL    Total Protein 7.1 6.0 - 8.4 g/dL    Albumin 3.9 3.5 - 5.2 g/dL    Total Bilirubin 0.6 0.1 - 1.0 mg/dL    Alkaline Phosphatase 49 (L) 55 - 135 U/L    AST 30 10 - 40 U/L    ALT 52 (H) 10 - 44 U/L    Anion Gap 14 8 - 16 mmol/L    eGFR if African American >60.0 >60 mL/min/1.73 m^2    eGFR if non African American >60.0 >60 mL/min/1.73 m^2   Magnesium    Collection Time: 08/02/20  3:36 AM   Result Value Ref Range    Magnesium 1.9 1.6 - 2.6 mg/dL   Phosphorus    Collection Time: 08/02/20  3:36 AM   Result Value Ref Range    Phosphorus 3.4 2.7 - 4.5 mg/dL   CBC with Automated Differential    Collection Time: 08/02/20  3:36 AM   Result Value Ref Range    WBC 17.34 (H) 3.90 - 12.70 K/uL    RBC 5.51 4.60 - 6.20 M/uL    Hemoglobin 15.6 14.0 - 18.0 g/dL    Hematocrit 48.1 40.0 - 54.0 %    Mean Corpuscular Volume 87 82 - 98 fL    Mean Corpuscular Hemoglobin 28.3 27.0 - 31.0 pg    Mean Corpuscular Hemoglobin Conc 32.4 32.0 - 36.0 g/dL    RDW 13.2 11.5 - 14.5 %    Platelets 279 150 - 350 K/uL    MPV 9.6 9.2 - 12.9 fL    Immature Granulocytes 0.4 0.0 - 0.5 %    Gran # (ANC) 15.9 (H) 1.8 - 7.7 K/uL    Immature Grans (Abs) 0.07 (H) 0.00 - 0.04 K/uL    Lymph # 1.1 1.0 - 4.8 K/uL    Mono # 0.3 0.3 - 1.0 K/uL    Eos # 0.0 0.0 - 0.5 K/uL    Baso # 0.01 0.00 - 0.20 K/uL    nRBC 0 0 /100 WBC    Gran% 91.5 (H) 38.0 - 73.0 %    Lymph% 6.4 (L) 18.0 - 48.0 %    Mono% 1.6 (L) 4.0 - 15.0 %    Eosinophil% 0.0 0.0 - 8.0 %    Basophil% 0.1 0.0 - 1.9 %    Differential Method Automated    ISTAT PROCEDURE    Collection Time: 08/02/20  5:43 AM   Result Value Ref Range    POC PH 7.387 7.35 - 7.45    POC PCO2 44.7 35 - 45 mmHg    POC PO2 90 80 - 100 mmHg    POC HCO3 26.9 24 - 28 mmol/L     POC BE 2 -2 to 2 mmol/L    POC SATURATED O2 97 95 - 100 %    POC Glucose 174 (H) 70 - 110 mg/dL    POC Sodium 139 136 - 145 mmol/L    POC Potassium 3.7 3.5 - 5.1 mmol/L    POC TCO2 28 (H) 23 - 27 mmol/L    POC Ionized Calcium 1.22 1.06 - 1.42 mmol/L    POC Hematocrit 48 36 - 54 %PCV    Rate 22     Sample ARTERIAL     Site LR     Allens Test Pass     DelSys Adult Vent     Mode SPONT     Flow 80     FiO2 80     Sp02 96    ISTAT PROCEDURE    Collection Time: 08/02/20 11:34 AM   Result Value Ref Range    POC PH 7.424 7.35 - 7.45    POC PCO2 41.9 35 - 45 mmHg    POC PO2 63 (L) 80 - 100 mmHg    POC HCO3 27.5 24 - 28 mmol/L    POC BE 3 -2 to 2 mmol/L    POC SATURATED O2 92 (L) 95 - 100 %    POC Glucose 169 (H) 70 - 110 mg/dL    POC Sodium 141 136 - 145 mmol/L    POC Potassium 3.8 3.5 - 5.1 mmol/L    POC TCO2 29 (H) 23 - 27 mmol/L    POC Ionized Calcium 1.19 1.06 - 1.42 mmol/L    POC Hematocrit 51 36 - 54 %PCV    Sample ARTERIAL     Site RB     Allens Test N/A     DelSys Adult Vent     Mode PSV     PEEP 5     PS 10     FiO2 40     Sp02 93    D dimer, quantitative    Collection Time: 08/03/20  4:07 AM   Result Value Ref Range    D-Dimer 0.86 (H) <0.50 ug/mL FEU   Comprehensive Metabolic Panel (CMP)    Collection Time: 08/03/20  4:09 AM   Result Value Ref Range    Sodium 136 136 - 145 mmol/L    Potassium 3.1 (L) 3.5 - 5.1 mmol/L    Chloride 96 95 - 110 mmol/L    CO2 27 23 - 29 mmol/L    Glucose 234 (H) 70 - 110 mg/dL    BUN, Bld 35 (H) 8 - 23 mg/dL    Creatinine 1.1 0.5 - 1.4 mg/dL    Calcium 8.6 (L) 8.7 - 10.5 mg/dL    Total Protein 7.2 6.0 - 8.4 g/dL    Albumin 3.9 3.5 - 5.2 g/dL    Total Bilirubin 0.7 0.1 - 1.0 mg/dL    Alkaline Phosphatase 45 (L) 55 - 135 U/L    AST 18 10 - 40 U/L    ALT 35 10 - 44 U/L    Anion Gap 13 8 - 16 mmol/L    eGFR if African American >60.0 >60 mL/min/1.73 m^2    eGFR if non African American >60.0 >60 mL/min/1.73 m^2   Magnesium    Collection Time: 08/03/20  4:09 AM   Result Value Ref Range     Magnesium 2.2 1.6 - 2.6 mg/dL   Phosphorus    Collection Time: 08/03/20  4:09 AM   Result Value Ref Range    Phosphorus 2.6 (L) 2.7 - 4.5 mg/dL   CBC with Automated Differential    Collection Time: 08/03/20  4:09 AM   Result Value Ref Range    WBC 23.66 (H) 3.90 - 12.70 K/uL    RBC 4.88 4.60 - 6.20 M/uL    Hemoglobin 14.3 14.0 - 18.0 g/dL    Hematocrit 43.1 40.0 - 54.0 %    Mean Corpuscular Volume 88 82 - 98 fL    Mean Corpuscular Hemoglobin 29.3 27.0 - 31.0 pg    Mean Corpuscular Hemoglobin Conc 33.2 32.0 - 36.0 g/dL    RDW 13.2 11.5 - 14.5 %    Platelets 278 150 - 350 K/uL    MPV 9.6 9.2 - 12.9 fL    Immature Granulocytes Test Not Performed 0.0 - 0.5 %    Immature Grans (Abs) Test Not Performed 0.00 - 0.04 K/uL    nRBC 0 0 /100 WBC    Gran% 95.0 (H) 38.0 - 73.0 %    Lymph% 3.0 (L) 18.0 - 48.0 %    Mono% 0.0 (L) 4.0 - 15.0 %    Eosinophil% 0.0 0.0 - 8.0 %    Basophil% 0.0 0.0 - 1.9 %    Bands 2.0 %    Aniso Slight     Differential Method Manual    POCT glucose    Collection Time: 08/03/20  8:55 PM   Result Value Ref Range    POC Glucose 201 (H) 70 - 110   Comprehensive Metabolic Panel (CMP)    Collection Time: 08/04/20  4:35 AM   Result Value Ref Range    Sodium 139 136 - 145 mmol/L    Potassium 3.9 3.5 - 5.1 mmol/L    Chloride 98 95 - 110 mmol/L    CO2 30 (H) 23 - 29 mmol/L    Glucose 237 (H) 70 - 110 mg/dL    BUN, Bld 40 (H) 8 - 23 mg/dL    Creatinine 0.8 0.5 - 1.4 mg/dL    Calcium 9.2 8.7 - 10.5 mg/dL    Total Protein 6.6 6.0 - 8.4 g/dL    Albumin 3.7 3.5 - 5.2 g/dL    Total Bilirubin 0.3 0.1 - 1.0 mg/dL    Alkaline Phosphatase 39 (L) 55 - 135 U/L    AST 15 10 - 40 U/L    ALT 28 10 - 44 U/L    Anion Gap 11 8 - 16 mmol/L    eGFR if African American >60.0 >60 mL/min/1.73 m^2    eGFR if non African American >60.0 >60 mL/min/1.73 m^2   Magnesium    Collection Time: 08/04/20  4:35 AM   Result Value Ref Range    Magnesium 2.2 1.6 - 2.6 mg/dL   Phosphorus    Collection Time: 08/04/20  4:35 AM   Result Value Ref Range     Phosphorus 3.1 2.7 - 4.5 mg/dL   CBC with Automated Differential    Collection Time: 08/04/20  4:35 AM   Result Value Ref Range    WBC 22.09 (H) 3.90 - 12.70 K/uL    RBC 4.67 4.60 - 6.20 M/uL    Hemoglobin 13.5 (L) 14.0 - 18.0 g/dL    Hematocrit 40.9 40.0 - 54.0 %    Mean Corpuscular Volume 88 82 - 98 fL    Mean Corpuscular Hemoglobin 28.9 27.0 - 31.0 pg    Mean Corpuscular Hemoglobin Conc 33.0 32.0 - 36.0 g/dL    RDW 13.4 11.5 - 14.5 %    Platelets 284 150 - 350 K/uL    MPV 9.4 9.2 - 12.9 fL    Immature Granulocytes 0.8 (H) 0.0 - 0.5 %    Gran # (ANC) 19.9 (H) 1.8 - 7.7 K/uL    Immature Grans (Abs) 0.17 (H) 0.00 - 0.04 K/uL    Lymph # 1.1 1.0 - 4.8 K/uL    Mono # 0.9 0.3 - 1.0 K/uL    Eos # 0.0 0.0 - 0.5 K/uL    Baso # 0.02 0.00 - 0.20 K/uL    nRBC 0 0 /100 WBC    Gran% 90.0 (H) 38.0 - 73.0 %    Lymph% 5.2 (L) 18.0 - 48.0 %    Mono% 3.9 (L) 4.0 - 15.0 %    Eosinophil% 0.0 0.0 - 8.0 %    Basophil% 0.1 0.0 - 1.9 %    Differential Method Automated    POCT glucose    Collection Time: 08/04/20  5:26 AM   Result Value Ref Range    POC Glucose 282 (H) 70 - 110   POCT glucose    Collection Time: 08/04/20 11:31 AM   Result Value Ref Range    POC Glucose 210 (H) 70 - 110   POCT glucose    Collection Time: 08/04/20  4:32 PM   Result Value Ref Range    POC Glucose 242 (H) 70 - 110   POCT glucose    Collection Time: 08/04/20  8:38 PM   Result Value Ref Range    POC Glucose 115 (H) 70 - 110   Comprehensive Metabolic Panel (CMP)    Collection Time: 08/05/20  4:47 AM   Result Value Ref Range    Sodium 139 136 - 145 mmol/L    Potassium 3.9 3.5 - 5.1 mmol/L    Chloride 95 95 - 110 mmol/L    CO2 32 (H) 23 - 29 mmol/L    Glucose 181 (H) 70 - 110 mg/dL    BUN, Bld 36 (H) 8 - 23 mg/dL    Creatinine 0.7 0.5 - 1.4 mg/dL    Calcium 9.1 8.7 - 10.5 mg/dL    Total Protein 7.0 6.0 - 8.4 g/dL    Albumin 4.1 3.5 - 5.2 g/dL    Total Bilirubin 0.8 0.1 - 1.0 mg/dL    Alkaline Phosphatase 39 (L) 55 - 135 U/L    AST 15 10 - 40 U/L    ALT 24 10 -  44 U/L    Anion Gap 12 8 - 16 mmol/L    eGFR if African American >60.0 >60 mL/min/1.73 m^2    eGFR if non African American >60.0 >60 mL/min/1.73 m^2   Magnesium    Collection Time: 08/05/20  4:47 AM   Result Value Ref Range    Magnesium 2.4 1.6 - 2.6 mg/dL   Phosphorus    Collection Time: 08/05/20  4:47 AM   Result Value Ref Range    Phosphorus 3.6 2.7 - 4.5 mg/dL   CBC with Automated Differential    Collection Time: 08/05/20  4:47 AM   Result Value Ref Range    WBC 16.25 (H) 3.90 - 12.70 K/uL    RBC 5.37 4.60 - 6.20 M/uL    Hemoglobin 15.4 14.0 - 18.0 g/dL    Hematocrit 46.8 40.0 - 54.0 %    Mean Corpuscular Volume 87 82 - 98 fL    Mean Corpuscular Hemoglobin 28.7 27.0 - 31.0 pg    Mean Corpuscular Hemoglobin Conc 32.9 32.0 - 36.0 g/dL    RDW 13.2 11.5 - 14.5 %    Platelets 285 150 - 350 K/uL    MPV 9.4 9.2 - 12.9 fL    Immature Granulocytes 0.7 (H) 0.0 - 0.5 %    Gran # (ANC) 13.9 (H) 1.8 - 7.7 K/uL    Immature Grans (Abs) 0.11 (H) 0.00 - 0.04 K/uL    Lymph # 1.5 1.0 - 4.8 K/uL    Mono # 0.7 0.3 - 1.0 K/uL    Eos # 0.0 0.0 - 0.5 K/uL    Baso # 0.02 0.00 - 0.20 K/uL    nRBC 0 0 /100 WBC    Gran% 85.3 (H) 38.0 - 73.0 %    Lymph% 9.5 (L) 18.0 - 48.0 %    Mono% 4.4 4.0 - 15.0 %    Eosinophil% 0.0 0.0 - 8.0 %    Basophil% 0.1 0.0 - 1.9 %    Differential Method Automated    Basic metabolic panel    Collection Time: 08/05/20  4:47 AM   Result Value Ref Range    Sodium 139 136 - 145 mmol/L    Potassium 3.9 3.5 - 5.1 mmol/L    Chloride 95 95 - 110 mmol/L    CO2 32 (H) 23 - 29 mmol/L    Glucose 181 (H) 70 - 110 mg/dL    BUN, Bld 36 (H) 8 - 23 mg/dL    Creatinine 0.7 0.5 - 1.4 mg/dL    Calcium 9.1 8.7 - 10.5 mg/dL    Anion Gap 12 8 - 16 mmol/L    eGFR if African American >60.0 >60 mL/min/1.73 m^2    eGFR if non African American >60.0 >60 mL/min/1.73 m^2   CBC auto differential    Collection Time: 08/05/20  4:47 AM   Result Value Ref Range    WBC 16.25 (H) 3.90 - 12.70 K/uL    RBC 5.37 4.60 - 6.20 M/uL    Hemoglobin 15.4 14.0  - 18.0 g/dL    Hematocrit 46.8 40.0 - 54.0 %    Mean Corpuscular Volume 87 82 - 98 fL    Mean Corpuscular Hemoglobin 28.7 27.0 - 31.0 pg    Mean Corpuscular Hemoglobin Conc 32.9 32.0 - 36.0 g/dL    RDW 13.2 11.5 - 14.5 %    Platelets 285 150 - 350 K/uL    MPV 9.4 9.2 - 12.9 fL    Immature Granulocytes 0.7 (H) 0.0 - 0.5 %    Gran # (ANC) 13.9 (H) 1.8 - 7.7 K/uL    Immature Grans (Abs) 0.11 (H) 0.00 - 0.04 K/uL    Lymph # 1.5 1.0 - 4.8 K/uL    Mono # 0.7 0.3 - 1.0 K/uL    Eos # 0.0 0.0 - 0.5 K/uL    Baso # 0.02 0.00 - 0.20 K/uL    nRBC 0 0 /100 WBC    Gran% 85.3 (H) 38.0 - 73.0 %    Lymph% 9.5 (L) 18.0 - 48.0 %    Mono% 4.4 4.0 - 15.0 %    Eosinophil% 0.0 0.0 - 8.0 %    Basophil% 0.1 0.0 - 1.9 %    Differential Method Automated    POCT glucose    Collection Time: 08/05/20  5:32 AM   Result Value Ref Range    POC Glucose 164 (H) 70 - 110   Comprehensive metabolic panel    Collection Time: 09/03/20  9:29 AM   Result Value Ref Range    Sodium 139 136 - 145 mmol/L    Potassium 4.8 3.5 - 5.1 mmol/L    Chloride 103 95 - 110 mmol/L    CO2 27 23 - 29 mmol/L    Glucose 150 (H) 70 - 110 mg/dL    BUN, Bld 17 8 - 23 mg/dL    Creatinine 0.9 0.5 - 1.4 mg/dL    Calcium 9.8 8.7 - 10.5 mg/dL    Total Protein 7.5 6.0 - 8.4 g/dL    Albumin 4.2 3.5 - 5.2 g/dL    Total Bilirubin 0.3 0.1 - 1.0 mg/dL    Alkaline Phosphatase 54 (L) 55 - 135 U/L    AST 16 10 - 40 U/L    ALT 20 10 - 44 U/L    Anion Gap 9 8 - 16 mmol/L    eGFR if African American >60.0 >60 mL/min/1.73 m^2    eGFR if non African American >60.0 >60 mL/min/1.73 m^2   Hemoglobin A1C    Collection Time: 09/03/20  9:29 AM   Result Value Ref Range    Hemoglobin A1C 6.5 (H) 4.0 - 5.6 %    Estimated Avg Glucose 140 (H) 68 - 131 mg/dL   Lipid Panel    Collection Time: 09/03/20  9:29 AM   Result Value Ref Range    Cholesterol 241 (H) 120 - 199 mg/dL    Triglycerides 510 (H) 30 - 150 mg/dL    HDL 29 (L) 40 - 75 mg/dL    LDL Cholesterol Invalid, Trig>400.0 63.0 - 159.0 mg/dL     Hdl/Cholesterol Ratio 12.0 (L) 20.0 - 50.0 %    Total Cholesterol/HDL Ratio 8.3 (H) 2.0 - 5.0    Non-HDL Cholesterol 212 mg/dL   TSH    Collection Time: 09/03/20  9:29 AM   Result Value Ref Range    TSH 1.949 0.400 - 4.000 uIU/mL   CBC auto differential    Collection Time: 09/03/20  9:29 AM   Result Value Ref Range    WBC 8.49 3.90 - 12.70 K/uL    RBC 5.23 4.60 - 6.20 M/uL    Hemoglobin 15.1 14.0 - 18.0 g/dL    Hematocrit 47.7 40.0 - 54.0 %    Mean Corpuscular Volume 91 82 - 98 fL    Mean Corpuscular Hemoglobin 28.9 27.0 - 31.0 pg    Mean Corpuscular Hemoglobin Conc 31.7 (L) 32.0 - 36.0 g/dL    RDW 13.3 11.5 - 14.5 %    Platelets 343 150 - 350 K/uL    MPV 9.6 9.2 - 12.9 fL    Immature Granulocytes 0.2 0.0 - 0.5 %    Gran # (ANC) 3.6 1.8 - 7.7 K/uL    Immature Grans (Abs) 0.02 0.00 - 0.04 K/uL    Lymph # 3.4 1.0 - 4.8 K/uL    Mono # 0.8 0.3 - 1.0 K/uL    Eos # 0.6 (H) 0.0 - 0.5 K/uL    Baso # 0.05 0.00 - 0.20 K/uL    nRBC 0 0 /100 WBC    Gran% 43.0 38.0 - 73.0 %    Lymph% 40.5 18.0 - 48.0 %    Mono% 8.8 4.0 - 15.0 %    Eosinophil% 6.9 0.0 - 8.0 %    Basophil% 0.6 0.0 - 1.9 %    Differential Method Automated    Microalbumin/creatinine urine ratio    Collection Time: 09/03/20 10:21 AM   Result Value Ref Range    Microalbum.,U,Random 31.0 ug/mL    Creatinine, Random Ur 96.0 23.0 - 375.0 mg/dL    Microalb Creat Ratio 32.3 (H) 0.0 - 30.0 ug/mg          Assessment:       1. New onset type 2 diabetes mellitus    2. Mixed hyperlipidemia    3. Hypertension associated with diabetes          Plan:   The the is on    New onset type 2 diabetes mellitus  -     metFORMIN (GLUCOPHAGE) 500 MG tablet; Take 1 tablet (500 mg total) by mouth 2 (two) times daily with meals. Once a day for the 1st week  Dispense: 60 tablet; Refill: 3    Mixed hyperlipidemia  -     atorvastatin (LIPITOR) 40 MG tablet; Take 1 tablet (40 mg total) by mouth once daily.  Dispense: 90 tablet; Refill: 3  -     fenofibrate 160 MG Tab; Take 1 tablet (160 mg total)  by mouth once daily.  Dispense: 90 tablet; Refill: 3    Hypertension associated with diabetes  -     lisinopriL 10 MG tablet; Take 1 tablet (10 mg total) by mouth once daily.  Dispense: 90 tablet; Refill: 3      Follow up in about 1 month (around 10/4/2020).

## 2020-09-04 NOTE — PATIENT INSTRUCTIONS
Avoid white carbohydrates.   Eat  a palm sized protein with each meal.       Walk for 10 minutes after you eat.  This will keep your sugars from going high at that time.    In the next 2 weeks tele 100 people that were going to quit smoking in the following way I can't wait to quit smoking but Doctor Butt will not let me.  Is going to be so great when I do quit.  I am going to be setting it undermining on going to buy ______ for myself.  Going to breathe better and be able to keep up with my grandchildren.  I am going to do a lot more exercise and have a lot more fun. Then quit after 2 weeks.        Avoid  alcohol  Low-Salt Diet  This diet removes foods that are high in salt. It also limits the amount of salt you use when cooking. It is most often used for people with high blood pressure, edema (fluid retention), and kidney, liver, or heart disease.  Table salt contains the mineral sodium. Your body needs sodium to work normally. But too much sodium can make your health problems worse. Your healthcare provider is recommending a low-salt (also called low-sodium) diet for you. Your total daily allowance of salt is 1,500 to 2,300 milligrams (mg). It is less than 1 teaspoon of table salt. This means you can have only about 500 to 700 mg of sodium at each meal. People with certain health problems should limit salt intake to the lower end of the recommended range.    When you cook, don´t add much salt. If you can cook without using salt, even better. Don´t add salt to your food at the table.  When shopping, read food labels. Salt is often called sodium on the label. Choose foods that are salt-free, low salt, or very low salt. Note that foods with reduced salt may not lower your salt intake enough.    Beans, potatoes, and pasta  Ok: Dry beans, split peas, lentils, potatoes, rice, macaroni, pasta, spaghetti without added salt  Avoid: Potato chips, tortilla chips, and similar products  Breads and cereals  Ok: Low-sodium  breads, rolls, cereals, and cakes; low-salt crackers, matzo crackers  Avoid: Salted crackers, pretzels, popcorn, [place] toast, pancakes, muffins  Dairy  Ok: Milk, chocolate milk, hot chocolate mix, low-salt cheeses, and yogurt  Avoid: Processed cheese and cheese spreads; Roquefort, Camembert, and cottage cheese; buttermilk, instant breakfast drink  Desserts  Ok: Ice cream, frozen yogurt, juice bars, gelatin, cookies and pies, sugar, honey, jelly, hard candy  Avoid: Most pies, cakes and cookies prepared or processed with salt; instant pudding  Drinks  Ok: Tea, coffee, fizzy (carbonated) drinks, juices  Avoid: Flavored coffees, electrolyte replacement drinks, sports drinks  Meats  Ok: All fresh meat, fish, poultry, low-salt tuna, eggs, egg substitute  Avoid: Smoked, pickled, brine-cured, or salted meats and fish. This includes nunes, chipped beef, corned beef, hot dogs, deli meats, ham, kosher meats, salt pork, sausage, canned tuna, salted codfish, smoked salmon, herring, sardines, or anchovies.  Seasonings and spices  Ok: Most seasonings are okay. Good substitutes for salt include: fresh herb blends, hot sauce, lemon, garlic, pate, vinegar, dry mustard, parsley, cilantro, horseradish, tomato paste, regular margarine, mayonnaise, unsalted butter, cream cheese, vegetable oil, cream, low-salt salad dressing and gravy.  Avoid: Regular ketchup, relishes, pickles, soy sauce, teriyaki sauce, Worcestershire sauce, BBQ sauce, tartar sauce, meat tenderizer, chili sauce, regular gravy, regular salad dressing, salted butter  Soups  Ok: Low-salt soups and broths made with allowed foods  Avoid: Bouillon cubes, soups with smoked or salted meats, regular soup and broth  Vegetables  Ok: Most vegetables are okay; also low-salt tomato and vegetable juices  Avoid: Sauerkraut and other brine-soaked vegetables; pickles and other pickled vegetables; tomato juice, olives  © 2533-0906 [employer] [address] [place] [address],  DARRELL Frye [address]. All rights reserved. This information is not intended as a substitute for professional medical care. Always follow your healthcare professional's instructions.    Thank you for choosing [facility].     Please fill out the patient experience survey.

## 2020-09-18 ENCOUNTER — OFFICE VISIT (OUTPATIENT)
Dept: FAMILY MEDICINE | Facility: CLINIC | Age: 60
End: 2020-09-18
Payer: MEDICAID

## 2020-09-18 VITALS
HEART RATE: 57 BPM | WEIGHT: 177.5 LBS | RESPIRATION RATE: 16 BRPM | HEIGHT: 66 IN | DIASTOLIC BLOOD PRESSURE: 68 MMHG | TEMPERATURE: 98 F | BODY MASS INDEX: 28.53 KG/M2 | OXYGEN SATURATION: 97 % | SYSTOLIC BLOOD PRESSURE: 128 MMHG

## 2020-09-18 DIAGNOSIS — I50.22 CHRONIC SYSTOLIC CONGESTIVE HEART FAILURE: ICD-10-CM

## 2020-09-18 DIAGNOSIS — E78.5 HYPERLIPIDEMIA ASSOCIATED WITH TYPE 2 DIABETES MELLITUS: ICD-10-CM

## 2020-09-18 DIAGNOSIS — E11.9 CONTROLLED TYPE 2 DIABETES MELLITUS WITHOUT COMPLICATION, WITHOUT LONG-TERM CURRENT USE OF INSULIN: Primary | ICD-10-CM

## 2020-09-18 DIAGNOSIS — E11.69 HYPERLIPIDEMIA ASSOCIATED WITH TYPE 2 DIABETES MELLITUS: ICD-10-CM

## 2020-09-18 PROCEDURE — 99214 PR OFFICE/OUTPT VISIT, EST, LEVL IV, 30-39 MIN: ICD-10-PCS | Mod: S$GLB,,, | Performed by: NURSE PRACTITIONER

## 2020-09-18 PROCEDURE — 99214 OFFICE O/P EST MOD 30 MIN: CPT | Mod: S$GLB,,, | Performed by: NURSE PRACTITIONER

## 2020-09-18 RX ORDER — SPIRONOLACTONE 25 MG/1
25 TABLET ORAL DAILY
Qty: 90 TABLET | Refills: 3
Start: 2020-09-18 | End: 2022-07-11

## 2020-09-18 NOTE — PATIENT INSTRUCTIONS
If you are asked to answer a survey from [facility], please do so and let them know how I did at your visit today.        1800 calorie ADA diet given and explained.  No going barefoot, no crocks, except in the house, get a good pair of tennis shoes and wear socks. Look at the bottoms of your feet daily. Quit smoking. Complications of diabetes including blindness, MI, CVA, neuropathy, amputation and kidney failure discussed. All questions answered to the best of my ability. The patient verbalized understanding.      Check blood sugar fasting first thing in the morning once a week and check blood sugar before largest meal and 2 hours after same meal once a week . Record date, time and blood sugar and bring record with you to next visit.     Take the spironolactone in the morning, take either lisinopril or metoprolol at bedtime and the other one in the morning.

## 2020-09-18 NOTE — PROGRESS NOTES
"Subjective:      9:56 AM     Patient ID: Kal Dodd is a 60 y.o. male.    Chief Complaint: Diabetes    Diabetes  He presents for his follow-up diabetic visit. He has type 2 diabetes mellitus. Pertinent negatives for hypoglycemia include no dizziness, headaches, nervousness/anxiousness or pallor. Pertinent negatives for diabetes include no chest pain and no fatigue.     Review of Systems   Constitutional: Negative for fatigue, fever and unexpected weight change.   HENT: Negative for congestion, hearing loss and sore throat.    Eyes: Negative for pain, redness and visual disturbance.   Respiratory: Negative for cough and shortness of breath.    Cardiovascular: Negative for chest pain and leg swelling.   Gastrointestinal: Negative for constipation, diarrhea, nausea and vomiting.   Endocrine: Negative for cold intolerance and heat intolerance.   Genitourinary: Negative for dysuria and hematuria.   Musculoskeletal: Negative for arthralgias and myalgias.   Skin: Negative for pallor and rash.   Neurological: Negative for dizziness and headaches.   Psychiatric/Behavioral: Negative for dysphoric mood. The patient is not nervous/anxious.          Objective:      Vitals:    09/18/20 0918   BP: 128/68   Pulse: (!) 57   Resp: 16   Temp: 98 °F (36.7 °C)   TempSrc: Temporal   SpO2: 97%   Weight: 80.5 kg (177 lb 7.5 oz)   Height: 5' 6" (1.676 m)   PainSc: 0-No pain     Recent Results (from the past 1008 hour(s))   Comprehensive metabolic panel    Collection Time: 09/03/20  9:29 AM   Result Value Ref Range    Sodium 139 136 - 145 mmol/L    Potassium 4.8 3.5 - 5.1 mmol/L    Chloride 103 95 - 110 mmol/L    CO2 27 23 - 29 mmol/L    Glucose 150 (H) 70 - 110 mg/dL    BUN, Bld 17 8 - 23 mg/dL    Creatinine 0.9 0.5 - 1.4 mg/dL    Calcium 9.8 8.7 - 10.5 mg/dL    Total Protein 7.5 6.0 - 8.4 g/dL    Albumin 4.2 3.5 - 5.2 g/dL    Total Bilirubin 0.3 0.1 - 1.0 mg/dL    Alkaline Phosphatase 54 (L) 55 - 135 U/L    AST 16 10 - 40 U/L    ALT " 20 10 - 44 U/L    Anion Gap 9 8 - 16 mmol/L    eGFR if African American >60.0 >60 mL/min/1.73 m^2    eGFR if non African American >60.0 >60 mL/min/1.73 m^2   Hemoglobin A1C    Collection Time: 09/03/20  9:29 AM   Result Value Ref Range    Hemoglobin A1C 6.5 (H) 4.0 - 5.6 %    Estimated Avg Glucose 140 (H) 68 - 131 mg/dL   Lipid Panel    Collection Time: 09/03/20  9:29 AM   Result Value Ref Range    Cholesterol 241 (H) 120 - 199 mg/dL    Triglycerides 510 (H) 30 - 150 mg/dL    HDL 29 (L) 40 - 75 mg/dL    LDL Cholesterol Invalid, Trig>400.0 63.0 - 159.0 mg/dL    Hdl/Cholesterol Ratio 12.0 (L) 20.0 - 50.0 %    Total Cholesterol/HDL Ratio 8.3 (H) 2.0 - 5.0    Non-HDL Cholesterol 212 mg/dL   TSH    Collection Time: 09/03/20  9:29 AM   Result Value Ref Range    TSH 1.949 0.400 - 4.000 uIU/mL   CBC auto differential    Collection Time: 09/03/20  9:29 AM   Result Value Ref Range    WBC 8.49 3.90 - 12.70 K/uL    RBC 5.23 4.60 - 6.20 M/uL    Hemoglobin 15.1 14.0 - 18.0 g/dL    Hematocrit 47.7 40.0 - 54.0 %    Mean Corpuscular Volume 91 82 - 98 fL    Mean Corpuscular Hemoglobin 28.9 27.0 - 31.0 pg    Mean Corpuscular Hemoglobin Conc 31.7 (L) 32.0 - 36.0 g/dL    RDW 13.3 11.5 - 14.5 %    Platelets 343 150 - 350 K/uL    MPV 9.6 9.2 - 12.9 fL    Immature Granulocytes 0.2 0.0 - 0.5 %    Gran # (ANC) 3.6 1.8 - 7.7 K/uL    Immature Grans (Abs) 0.02 0.00 - 0.04 K/uL    Lymph # 3.4 1.0 - 4.8 K/uL    Mono # 0.8 0.3 - 1.0 K/uL    Eos # 0.6 (H) 0.0 - 0.5 K/uL    Baso # 0.05 0.00 - 0.20 K/uL    nRBC 0 0 /100 WBC    Gran% 43.0 38.0 - 73.0 %    Lymph% 40.5 18.0 - 48.0 %    Mono% 8.8 4.0 - 15.0 %    Eosinophil% 6.9 0.0 - 8.0 %    Basophil% 0.6 0.0 - 1.9 %    Differential Method Automated    Microalbumin/creatinine urine ratio    Collection Time: 09/03/20 10:21 AM   Result Value Ref Range    Microalbum.,U,Random 31.0 ug/mL    Creatinine, Random Ur 96.0 23.0 - 375.0 mg/dL    Microalb Creat Ratio 32.3 (H) 0.0 - 30.0 ug/mg     Physical  Exam  Vitals signs and nursing note reviewed.   Constitutional:       General: He is not in acute distress.     Appearance: Normal appearance. He is well-developed. He is not diaphoretic.   HENT:      Head: Normocephalic and atraumatic.   Eyes:      General: No scleral icterus.        Right eye: No discharge.         Left eye: No discharge.      Conjunctiva/sclera: Conjunctivae normal.   Cardiovascular:      Rate and Rhythm: Normal rate and regular rhythm.      Heart sounds: Normal heart sounds. No murmur. No friction rub. No gallop.    Pulmonary:      Effort: Pulmonary effort is normal. No respiratory distress.      Breath sounds: No stridor. Wheezing present. No rhonchi or rales.   Musculoskeletal:         General: No swelling.      Right lower leg: No edema.      Left lower leg: No edema.   Skin:     General: Skin is warm and dry.      Capillary Refill: Capillary refill takes less than 2 seconds.      Coloration: Skin is not jaundiced or pale.      Findings: No rash.   Neurological:      Mental Status: He is alert and oriented to person, place, and time.   Psychiatric:         Mood and Affect: Mood normal.         Behavior: Behavior normal.           Assessment:        This provider spent  25 minutes face to face with patient, more than half the time for counseling and coordination of care as noted.    1. Controlled type 2 diabetes mellitus without complication, without long-term current use of insulin    2. Chronic systolic congestive heart failure    3. Hyperlipidemia associated with type 2 diabetes mellitus          Plan:       Controlled type 2 diabetes mellitus without complication, without long-term current use of insulin  -     Hemoglobin A1C; Future; Expected date: 12/18/2020  -     Comprehensive metabolic panel; Future; Expected date: 12/18/2020  -     Microalbumin/creatinine urine ratio; Future; Expected date: 12/18/2020    Chronic systolic congestive heart failure  -     spironolactone (ALDACTONE) 25  MG tablet; Take 1 tablet (25 mg total) by mouth once daily.  Dispense: 90 tablet; Refill: 3    Hyperlipidemia associated with type 2 diabetes mellitus  -     Lipid Panel; Future; Expected date: 12/18/2020         1800 calorie ADA diet given and explained.  No going barefoot, no crocks, except in the house, get a good pair of tennis shoes and wear socks. Look at the bottoms of your feet daily. Quit smoking. Complications of diabetes including blindness, MI, CVA, neuropathy, amputation and kidney failure discussed. All questions answered to the best of my ability. The patient verbalized understanding.      Check blood sugar fasting first thing in the morning once a week and check blood sugar before largest meal and 2 hours after same meal once a week . Record date, time and blood sugar and bring record with you to next visit.     Take the spironolactone in the morning, take either lisinopril or metoprolol at bedtime and the other one in the morning.

## 2020-12-04 ENCOUNTER — PATIENT OUTREACH (OUTPATIENT)
Dept: ADMINISTRATIVE | Facility: HOSPITAL | Age: 60
End: 2020-12-04

## 2020-12-04 DIAGNOSIS — E11.9 TYPE 2 DIABETES MELLITUS WITHOUT COMPLICATION, UNSPECIFIED WHETHER LONG TERM INSULIN USE: ICD-10-CM

## 2020-12-04 NOTE — PROGRESS NOTES
Chart review completed 2020.  Care Everywhere updates requested and reviewed.  Immunizations reconciled. Media reports reviewed.  Duplicate HM overrides and  orders removed.  Overdue HM topic chart audit and/or requested.  Overdue lab testing linked to upcoming lab appointments if applies.        Health Maintenance Due   Topic Date Due    Colorectal Cancer Screening  1960    Foot Exam  1969    Eye Exam  1969    TETANUS VACCINE  1977    Shingles Vaccine (1 of 2) 2009    Influenza Vaccine (1) 2020

## 2021-02-25 ENCOUNTER — IMMUNIZATION (OUTPATIENT)
Dept: FAMILY MEDICINE | Facility: CLINIC | Age: 61
End: 2021-02-25
Payer: MEDICAID

## 2021-02-25 DIAGNOSIS — Z23 NEED FOR VACCINATION: Primary | ICD-10-CM

## 2021-02-25 PROCEDURE — 91300 COVID-19, MRNA, LNP-S, PF, 30 MCG/0.3 ML DOSE VACCINE: CPT | Mod: PBBFAC,PO

## 2021-03-18 ENCOUNTER — IMMUNIZATION (OUTPATIENT)
Dept: FAMILY MEDICINE | Facility: CLINIC | Age: 61
End: 2021-03-18
Payer: MEDICAID

## 2021-03-18 DIAGNOSIS — Z23 NEED FOR VACCINATION: Primary | ICD-10-CM

## 2021-03-18 PROCEDURE — 0002A COVID-19, MRNA, LNP-S, PF, 30 MCG/0.3 ML DOSE VACCINE: CPT | Mod: PBBFAC,PO

## 2021-03-18 PROCEDURE — 91300 COVID-19, MRNA, LNP-S, PF, 30 MCG/0.3 ML DOSE VACCINE: CPT | Mod: PBBFAC,PO

## 2021-05-17 DIAGNOSIS — I15.2 HYPERTENSION ASSOCIATED WITH DIABETES: ICD-10-CM

## 2021-05-17 DIAGNOSIS — E11.59 HYPERTENSION ASSOCIATED WITH DIABETES: ICD-10-CM

## 2021-05-17 RX ORDER — LISINOPRIL 10 MG/1
10 TABLET ORAL DAILY
Qty: 30 TABLET | Refills: 0 | OUTPATIENT
Start: 2021-05-17 | End: 2022-05-17

## 2021-08-04 DIAGNOSIS — Z12.11 COLON CANCER SCREENING: ICD-10-CM

## 2021-12-06 ENCOUNTER — PATIENT OUTREACH (OUTPATIENT)
Dept: ADMINISTRATIVE | Facility: HOSPITAL | Age: 61
End: 2021-12-06
Payer: MEDICAID

## 2021-12-22 ENCOUNTER — TELEPHONE (OUTPATIENT)
Dept: FAMILY MEDICINE | Facility: CLINIC | Age: 61
End: 2021-12-22
Payer: MEDICAID

## 2021-12-22 ENCOUNTER — PATIENT OUTREACH (OUTPATIENT)
Dept: ADMINISTRATIVE | Facility: HOSPITAL | Age: 61
End: 2021-12-22
Payer: MEDICAID

## 2021-12-22 DIAGNOSIS — E11.9 DIABETES MELLITUS WITHOUT COMPLICATION: ICD-10-CM

## 2021-12-22 DIAGNOSIS — E11.59 HYPERTENSION ASSOCIATED WITH DIABETES: Primary | ICD-10-CM

## 2021-12-22 DIAGNOSIS — I15.2 HYPERTENSION ASSOCIATED WITH DIABETES: Primary | ICD-10-CM

## 2021-12-22 DIAGNOSIS — Z13.6 ENCOUNTER FOR SCREENING FOR CARDIOVASCULAR DISORDERS: ICD-10-CM

## 2021-12-27 ENCOUNTER — PATIENT OUTREACH (OUTPATIENT)
Dept: ADMINISTRATIVE | Facility: HOSPITAL | Age: 61
End: 2021-12-27
Payer: MEDICAID

## 2022-06-16 DIAGNOSIS — E78.5 HYPERLIPIDEMIA ASSOCIATED WITH TYPE 2 DIABETES MELLITUS: ICD-10-CM

## 2022-06-16 DIAGNOSIS — E11.9 CONTROLLED TYPE 2 DIABETES MELLITUS WITHOUT COMPLICATION, WITHOUT LONG-TERM CURRENT USE OF INSULIN: Primary | ICD-10-CM

## 2022-06-16 DIAGNOSIS — E11.69 HYPERLIPIDEMIA ASSOCIATED WITH TYPE 2 DIABETES MELLITUS: ICD-10-CM

## 2022-06-16 DIAGNOSIS — E11.59 HYPERTENSION ASSOCIATED WITH DIABETES: ICD-10-CM

## 2022-06-16 DIAGNOSIS — I15.2 HYPERTENSION ASSOCIATED WITH DIABETES: ICD-10-CM

## 2022-06-16 RX ORDER — LISINOPRIL 10 MG/1
10 TABLET ORAL DAILY
Qty: 30 TABLET | Refills: 0 | Status: SHIPPED | OUTPATIENT
Start: 2022-06-16 | End: 2022-07-11 | Stop reason: SDUPTHER

## 2022-06-16 NOTE — TELEPHONE ENCOUNTER
No new care gaps identified.  [facility] Embedded Care Gaps. Reference number: [id number]. 6/16/2022   1:50:25 PM CDT

## 2022-06-16 NOTE — TELEPHONE ENCOUNTER
----- Message from Johana Quezada sent at 6/16/2022 12:15 PM CDT -----  Type: Needs Medical Advice  Who Called:  pt   Symptoms (please be specific):  Need a new PCP/Meds filled  Best Call Back Number: [phone] (home)     Additional Information: pt need to see a new PCP said he got a letter telling him he need to get in touch with the office the soonest appt was 8/18 and pt would like to be seen before then--please advise thank you he also need meds refilled he only have two days left of lisinopriL 10 MG tablet and need advice

## 2022-07-11 ENCOUNTER — TELEPHONE (OUTPATIENT)
Dept: FAMILY MEDICINE | Facility: CLINIC | Age: 62
End: 2022-07-11

## 2022-07-11 ENCOUNTER — OFFICE VISIT (OUTPATIENT)
Dept: FAMILY MEDICINE | Facility: CLINIC | Age: 62
End: 2022-07-11
Payer: MEDICAID

## 2022-07-11 VITALS
RESPIRATION RATE: 16 BRPM | BODY MASS INDEX: 29.62 KG/M2 | TEMPERATURE: 98 F | HEART RATE: 71 BPM | WEIGHT: 184.31 LBS | DIASTOLIC BLOOD PRESSURE: 72 MMHG | OXYGEN SATURATION: 95 % | HEIGHT: 66 IN | SYSTOLIC BLOOD PRESSURE: 128 MMHG

## 2022-07-11 DIAGNOSIS — I25.10 CORONARY ARTERY DISEASE INVOLVING NATIVE CORONARY ARTERY OF NATIVE HEART WITHOUT ANGINA PECTORIS: ICD-10-CM

## 2022-07-11 DIAGNOSIS — E11.59 HYPERTENSION ASSOCIATED WITH DIABETES: ICD-10-CM

## 2022-07-11 DIAGNOSIS — I50.22 CHRONIC SYSTOLIC CONGESTIVE HEART FAILURE: ICD-10-CM

## 2022-07-11 DIAGNOSIS — R73.03 PREDIABETES: ICD-10-CM

## 2022-07-11 DIAGNOSIS — E78.2 MIXED HYPERLIPIDEMIA: Primary | ICD-10-CM

## 2022-07-11 DIAGNOSIS — I15.2 HYPERTENSION ASSOCIATED WITH DIABETES: ICD-10-CM

## 2022-07-11 PROCEDURE — 99999 PR PBB SHADOW E&M-EST. PATIENT-LVL IV: ICD-10-PCS | Mod: PBBFAC,,,

## 2022-07-11 PROCEDURE — 99999 PR PBB SHADOW E&M-EST. PATIENT-LVL IV: CPT | Mod: PBBFAC,,,

## 2022-07-11 PROCEDURE — 3008F PR BODY MASS INDEX (BMI) DOCUMENTED: ICD-10-PCS | Mod: CPTII,,,

## 2022-07-11 PROCEDURE — 1160F RVW MEDS BY RX/DR IN RCRD: CPT | Mod: CPTII,,,

## 2022-07-11 PROCEDURE — 99214 PR OFFICE/OUTPT VISIT, EST, LEVL IV, 30-39 MIN: ICD-10-PCS | Mod: S$PBB,,,

## 2022-07-11 PROCEDURE — 99214 OFFICE O/P EST MOD 30 MIN: CPT | Mod: PBBFAC,PO

## 2022-07-11 PROCEDURE — 99214 OFFICE O/P EST MOD 30 MIN: CPT | Mod: S$PBB,,,

## 2022-07-11 PROCEDURE — 3074F PR MOST RECENT SYSTOLIC BLOOD PRESSURE < 130 MM HG: ICD-10-PCS | Mod: CPTII,,,

## 2022-07-11 PROCEDURE — 1160F PR REVIEW ALL MEDS BY PRESCRIBER/CLIN PHARMACIST DOCUMENTED: ICD-10-PCS | Mod: CPTII,,,

## 2022-07-11 PROCEDURE — 4010F ACE/ARB THERAPY RXD/TAKEN: CPT | Mod: CPTII,,,

## 2022-07-11 PROCEDURE — 3078F DIAST BP <80 MM HG: CPT | Mod: CPTII,,,

## 2022-07-11 PROCEDURE — 4010F PR ACE/ARB THEARPY RXD/TAKEN: ICD-10-PCS | Mod: CPTII,,,

## 2022-07-11 PROCEDURE — 1159F PR MEDICATION LIST DOCUMENTED IN MEDICAL RECORD: ICD-10-PCS | Mod: CPTII,,,

## 2022-07-11 PROCEDURE — 3078F PR MOST RECENT DIASTOLIC BLOOD PRESSURE < 80 MM HG: ICD-10-PCS | Mod: CPTII,,,

## 2022-07-11 PROCEDURE — 1159F MED LIST DOCD IN RCRD: CPT | Mod: CPTII,,,

## 2022-07-11 PROCEDURE — 3074F SYST BP LT 130 MM HG: CPT | Mod: CPTII,,,

## 2022-07-11 PROCEDURE — 3008F BODY MASS INDEX DOCD: CPT | Mod: CPTII,,,

## 2022-07-11 RX ORDER — NITROGLYCERIN 0.4 MG/1
0.4 TABLET SUBLINGUAL EVERY 5 MIN PRN
Qty: 20 TABLET | Refills: 1 | Status: SHIPPED | OUTPATIENT
Start: 2022-07-11 | End: 2022-07-11

## 2022-07-11 RX ORDER — LISINOPRIL 10 MG/1
10 TABLET ORAL DAILY
Qty: 90 TABLET | Refills: 3 | Status: SHIPPED | OUTPATIENT
Start: 2022-07-11 | End: 2023-06-27 | Stop reason: SDUPTHER

## 2022-07-11 RX ORDER — NITROGLYCERIN 0.4 MG/1
0.4 TABLET SUBLINGUAL EVERY 5 MIN PRN
Qty: 25 TABLET | Refills: 1 | Status: SHIPPED | OUTPATIENT
Start: 2022-07-11 | End: 2023-07-11

## 2022-07-11 RX ORDER — ROSUVASTATIN CALCIUM 10 MG/1
10 TABLET, COATED ORAL DAILY
Qty: 90 TABLET | Refills: 3 | Status: SHIPPED | OUTPATIENT
Start: 2022-07-11 | End: 2023-01-11

## 2022-07-11 NOTE — TELEPHONE ENCOUNTER
----- Message from Damien Teixeira sent at 7/11/2022  4:14 PM CDT -----  Contact: Kalen at [phone]  Type: Needs Medical Advice  Who Called:  Kalen at [facility] [place]    Best Call Back Number: [phone]  Additional Information: Kalen is calling the office to get clarification on the quantity for nitroGLYCERIN (NITROSTAT) 0.4 MG SL tablet. She states it comes in a bottle of 25. Please call back and advise.

## 2022-07-11 NOTE — PATIENT INSTRUCTIONS
Uri Bowden,     If you are due for any health screening(s) below please notify me so we can arrange them to be ordered and scheduled to maintain your health. Most healthy patients complete it. Don't lose out on improving your health.     Health Maintenance   Topic Date Due    Foot Exam  Never done    Eye Exam  Never done    TETANUS VACCINE  Never done    Hemoglobin A1c  03/03/2021    Lipid Panel  09/03/2021    Aspirin/Antiplatelet Therapy  07/11/2023    Hepatitis C Screening  Completed          Colon Cancer Screening    Of cancers affecting both men and women, colorectal cancer is the third leading cancer killer in the [place]. But it doesnt have to be. Screening can prevent colorectal cancer or find it at an early stage when treatment often leads to a cure.    A colonoscopy is the preferred test for detecting colon cancer. It is needed only once every 10 years if results are negative. While sedated, a flexible, lighted tube with a tiny camera is inserted into the rectum and advanced through the colon to look for cancers. An alternative screening test that is used at home and returned to the lab may also be used. It detects hidden blood in bowel movements which could indicate cancer in the colon. If results are positive, you will need a colonoscopy to determine if the blood is a sign of cancer. This type of follow up (diagnostic) colonoscopy usually requires additional copays as required by your insurance provider. Please contact your PCP if you have any questions.    Although you are still overdue for this important screening, due to the COVID-19 pandemic, we recommend scheduling it in the near future.       Diabetic Retinal Eye Exam    Diabetes is the #1 cause of blindness in the [place] - early detection before signs or symptoms develop can prevent debilitating blindness.    Once-a-year screening is recommended for all diabetic patients. This exam can prevent and treat diabetes complications in the eye before  developing symptoms. This can be done with a special camera is used to take photographs of the back of your eye without having to dilate them, or you can see an eye doctor for a full dilated exam.    Although you are still overdue for this important screening, due to the COVID-19 pandemic, we recommend scheduling it in the near future.  Diabetic A1c Testing    Your chart identifies you as having diabetes. It is recommended that all diabetes patients have an A1c test done at least once a year, but [facility] recommends twice a year for most patients. This helps your doctor to better help you manage your diabetes. This is a non-fasting lab test which can be completed at any time. Your result will be sent to your Primary Care Provider for review and that office will contact you with the results.

## 2022-07-11 NOTE — PROGRESS NOTES
"Subjective:       Patient ID: Kal Dodd is a 62 y.o. male.    Chief Complaint: Establish Care, Medication Refill, Diabetes, and Hypertension    Eyal Dodd is a 61 yo M pt w/ MHx listed below that presents to the clinic to establish care. He does request refills of medications. He states that he has not been taking most medications and has only really been taking the lisinopril and aspirin at this time. It has been some time since he has been to the doctor he admits. He denies any acute health complaints at this time. Previously on metformin and statin therapy but stopped his meds because he wasn't feeling like himself for a while. Willing to start cholesterol medicine again today after discussing ASCVD risk. Had previous labs ordered by NARINDER not completed. Will get done at patient's convenience.       Past Medical History:   Diagnosis Date    Coronary artery disease 4/26/12    "massive heart attack"    Heart attack     Hyperlipidemia     Hypertension        Review of patient's allergies indicates:  No Known Allergies      Current Outpatient Medications:     aspirin (ECOTRIN) 81 MG EC tablet, Take 81 mg by mouth once daily.  , Disp: , Rfl:     lisinopriL 10 MG tablet, Take 1 tablet (10 mg total) by mouth once daily., Disp: 90 tablet, Rfl: 3    nitroGLYCERIN (NITROSTAT) 0.4 MG SL tablet, Place 1 tablet (0.4 mg total) under the tongue every 5 (five) minutes as needed for Chest pain., Disp: 20 tablet, Rfl: 1    rosuvastatin (CRESTOR) 10 MG tablet, Take 1 tablet (10 mg total) by mouth once daily., Disp: 90 tablet, Rfl: 3    Review of Systems   Constitutional: Negative for activity change, appetite change, chills, diaphoresis, fatigue, fever and unexpected weight change.   HENT: Negative for congestion, ear pain, postnasal drip, rhinorrhea, sinus pressure, sneezing, sore throat and trouble swallowing.    Eyes: Negative for pain, itching and visual disturbance.   Respiratory: Positive for wheezing. Negative " "for cough, chest tightness and shortness of breath.    Cardiovascular: Negative for chest pain, palpitations and leg swelling.   Gastrointestinal: Negative for abdominal distention, abdominal pain, blood in stool, constipation, diarrhea, nausea and vomiting.   Endocrine: Negative for cold intolerance and heat intolerance.   Genitourinary: Negative for difficulty urinating, dysuria, frequency, hematuria and urgency.   Musculoskeletal: Negative for arthralgias, back pain, myalgias and neck pain.   Skin: Negative for color change, pallor, rash and wound.   Neurological: Negative for dizziness, syncope, weakness, numbness and headaches.   Hematological: Negative for adenopathy.   Psychiatric/Behavioral: Negative for behavioral problems. The patient is not nervous/anxious.        Objective:      /72 (BP Location: Right arm, Patient Position: Sitting, BP Method: Medium (Manual))   Pulse 71   Temp 98.3 °F (36.8 °C) (Oral)   Resp 16   Ht 5' 6" (1.676 m)   Wt 83.6 kg (184 lb 4.9 oz)   SpO2 95%   BMI 29.75 kg/m²   Physical Exam  Vitals reviewed.   Constitutional:       General: He is not in acute distress.     Appearance: Normal appearance. He is normal weight. He is not ill-appearing, toxic-appearing or diaphoretic.   HENT:      Head: Normocephalic.      Right Ear: External ear normal.      Left Ear: External ear normal.      Nose: Nose normal. No congestion or rhinorrhea.      Mouth/Throat:      Mouth: Mucous membranes are moist.      Pharynx: Oropharynx is clear.   Eyes:      General: No scleral icterus.        Right eye: No discharge.         Left eye: No discharge.      Extraocular Movements: Extraocular movements intact.      Conjunctiva/sclera: Conjunctivae normal.   Cardiovascular:      Rate and Rhythm: Normal rate and regular rhythm.      Pulses: Normal pulses.      Heart sounds: Normal heart sounds. No murmur heard.    No friction rub. No gallop.   Pulmonary:      Effort: Pulmonary effort is normal. No " respiratory distress.      Breath sounds: Wheezing (mild inspiratory) present. No rhonchi or rales.   Chest:      Chest wall: No tenderness.   Abdominal:      General: There is no distension.      Palpations: Abdomen is soft. There is no mass.      Tenderness: There is no abdominal tenderness. There is no guarding.   Musculoskeletal:         General: No swelling, tenderness or deformity. Normal range of motion.      Cervical back: Normal range of motion.      Right lower leg: No edema.      Left lower leg: No edema.   Skin:     General: Skin is warm and dry.      Capillary Refill: Capillary refill takes less than 2 seconds.      Coloration: Skin is not jaundiced.      Findings: No bruising, erythema, lesion or rash.   Neurological:      Mental Status: He is alert and oriented to person, place, and time.      Gait: Gait normal.   Psychiatric:         Mood and Affect: Mood normal.         Behavior: Behavior normal.         Thought Content: Thought content normal.         Judgment: Judgment normal.         Assessment:       1. Mixed hyperlipidemia    2. Hypertension associated with diabetes    3. Coronary artery disease involving native coronary artery of native heart without angina pectoris    4. Chronic systolic congestive heart failure    5. Prediabetes        Plan:       Mixed hyperlipidemia  -     rosuvastatin (CRESTOR) 10 MG tablet; Take 1 tablet (10 mg total) by mouth once daily.  Dispense: 90 tablet; Refill: 3    The 10-year ASCVD risk score (Hecla VICKY Jr., et al., 2013) is: 48%    Values used to calculate the score:      Age: 62 years      Sex: Male      Is Non- : No      Diabetic: Yes      Tobacco smoker: Yes      Systolic Blood Pressure: 128 mmHg      Is BP treated: Yes      HDL Cholesterol: 29 mg/dL      Total Cholesterol: 241 mg/dL    Hypertension associated with diabetes  -     lisinopriL 10 MG tablet; Take 1 tablet (10 mg total) by mouth once daily.  Dispense: 90 tablet; Refill:  3    Coronary artery disease involving native coronary artery of native heart without angina pectoris  -     nitroGLYCERIN (NITROSTAT) 0.4 MG SL tablet; Place 1 tablet (0.4 mg total) under the tongue every 5 (five) minutes as needed for Chest pain.  Dispense: 20 tablet; Refill: 1    Chronic systolic congestive heart failure  -     nitroGLYCERIN (NITROSTAT) 0.4 MG SL tablet; Place 1 tablet (0.4 mg total) under the tongue every 5 (five) minutes as needed for Chest pain.  Dispense: 20 tablet; Refill: 1      Prediabetes        -    Will await lab results. May need to restart metformin or alternative med if pt cannot tolerate.       Patient to follow up in 6 months with Dr. Freed to establish care        Loki Leung PA-C  Family Medicine Physician Assistant       I spent a total of 20 minutes on the day of the visit.This includes face to face time and non-face to face time preparing to see the patient (eg, review of tests), obtaining and/or reviewing separately obtained history, documenting clinical information in the electronic or other health record, independently interpreting results and communicating results to the patient/family/caregiver, or care coordinator.      We have addressed [4] Moderate: 1 or more chronic illnesses with exacerbation, progression, or side effects of treatment / 2 or more stable chronic illnesses / 1 undiagnosed new problem with uncertain prognosis / 1 acute illness with systemic symptoms / 1 acute complicated injury  The complexity of the data reviewed and analyzed for this visit was [3] Limited (Reviewed prior external note, ordered unique testing or reviewed the results of each unique test)   The risk of complications and/or morbidity or mortality are [4] Moderate risk (I.e. prescription drug management / decision regarding minor surgery with identified pt or procedure risk factors / decision regarding elective major surgery without identified pt or procedure risk factors /  diagnosis or treatment significantly limited by social determinants of health)   The level of Medical Decision Making for this visit is [4] Moderate

## 2022-07-14 ENCOUNTER — TELEPHONE (OUTPATIENT)
Dept: FAMILY MEDICINE | Facility: CLINIC | Age: 62
End: 2022-07-14
Payer: MEDICAID

## 2022-07-14 NOTE — TELEPHONE ENCOUNTER
Call placed to Kalen (pharmacist) with [facility]. Mrs Higuera requested to change quantity of Nitroglycerin to 25 tablets. New prescription for Nitroglycerin e-scribed to pharmacy on 7-12-22 quantity 25 with 1 additional refill. Writer also placed a call to pharmacy on 7-12-22; spoke to Yanique on that date for notification of new prescription with requested quantity of 25. Writer notified Mrs. Higuera of above information at time of call. Mrs. Higuera confirmed pharmacy received prescription with correct quantity.

## 2022-07-14 NOTE — TELEPHONE ENCOUNTER
----- Message from Leopoldo Deng sent at 7/14/2022 11:56 AM CDT -----  Contact: Kalen (Pharmacy)  Type:  Pharmacy Calling to Clarify an RX    Name of Caller: Kalen (Pharmacy)    Pharmacy Name: [facility]    Prescription Name: nitroGLYCERIN (NITROSTAT) 0.4 MG SL tablet    What do they need to clarify?: change in quantity     Best Call Back Number: [phone]    Additional Information:

## 2023-01-11 ENCOUNTER — LAB VISIT (OUTPATIENT)
Dept: LAB | Facility: HOSPITAL | Age: 63
End: 2023-01-11
Attending: STUDENT IN AN ORGANIZED HEALTH CARE EDUCATION/TRAINING PROGRAM
Payer: MEDICAID

## 2023-01-11 ENCOUNTER — OFFICE VISIT (OUTPATIENT)
Dept: FAMILY MEDICINE | Facility: CLINIC | Age: 63
End: 2023-01-11
Payer: MEDICAID

## 2023-01-11 VITALS
DIASTOLIC BLOOD PRESSURE: 86 MMHG | HEART RATE: 72 BPM | HEIGHT: 66 IN | RESPIRATION RATE: 18 BRPM | SYSTOLIC BLOOD PRESSURE: 132 MMHG | OXYGEN SATURATION: 95 % | BODY MASS INDEX: 31.04 KG/M2 | WEIGHT: 193.13 LBS

## 2023-01-11 DIAGNOSIS — I50.20 HEART FAILURE WITH REDUCED EJECTION FRACTION: ICD-10-CM

## 2023-01-11 DIAGNOSIS — J44.9 CHRONIC OBSTRUCTIVE PULMONARY DISEASE, UNSPECIFIED COPD TYPE: Primary | ICD-10-CM

## 2023-01-11 DIAGNOSIS — J44.9 CHRONIC OBSTRUCTIVE PULMONARY DISEASE, UNSPECIFIED COPD TYPE: ICD-10-CM

## 2023-01-11 DIAGNOSIS — E78.2 MIXED HYPERLIPIDEMIA: ICD-10-CM

## 2023-01-11 DIAGNOSIS — Z72.0 TOBACCO USE: ICD-10-CM

## 2023-01-11 DIAGNOSIS — F11.20 CHRONIC NARCOTIC DEPENDENCE: ICD-10-CM

## 2023-01-11 DIAGNOSIS — E11.59 TYPE 2 DIABETES MELLITUS WITH OTHER CIRCULATORY COMPLICATION, WITHOUT LONG-TERM CURRENT USE OF INSULIN: ICD-10-CM

## 2023-01-11 LAB
ALBUMIN SERPL BCP-MCNC: 4.1 G/DL (ref 3.5–5.2)
ALP SERPL-CCNC: 54 U/L (ref 55–135)
ALT SERPL W/O P-5'-P-CCNC: 38 U/L (ref 10–44)
ANION GAP SERPL CALC-SCNC: 13 MMOL/L (ref 8–16)
AST SERPL-CCNC: 23 U/L (ref 10–40)
BASOPHILS # BLD AUTO: 0.05 K/UL (ref 0–0.2)
BASOPHILS NFR BLD: 0.5 % (ref 0–1.9)
BILIRUB SERPL-MCNC: 0.4 MG/DL (ref 0.1–1)
BUN SERPL-MCNC: 16 MG/DL (ref 8–23)
CALCIUM SERPL-MCNC: 9.7 MG/DL (ref 8.7–10.5)
CHLORIDE SERPL-SCNC: 102 MMOL/L (ref 95–110)
CHOLEST SERPL-MCNC: 176 MG/DL (ref 120–199)
CHOLEST/HDLC SERPL: 5 {RATIO} (ref 2–5)
CO2 SERPL-SCNC: 27 MMOL/L (ref 23–29)
CREAT SERPL-MCNC: 0.7 MG/DL (ref 0.5–1.4)
DIFFERENTIAL METHOD: NORMAL
EOSINOPHIL # BLD AUTO: 0.4 K/UL (ref 0–0.5)
EOSINOPHIL NFR BLD: 3.8 % (ref 0–8)
ERYTHROCYTE [DISTWIDTH] IN BLOOD BY AUTOMATED COUNT: 13.2 % (ref 11.5–14.5)
EST. GFR  (NO RACE VARIABLE): >60 ML/MIN/1.73 M^2
ESTIMATED AVG GLUCOSE: 160 MG/DL (ref 68–131)
GLUCOSE SERPL-MCNC: 164 MG/DL (ref 70–110)
HBA1C MFR BLD: 7.2 % (ref 4–5.6)
HCT VFR BLD AUTO: 48.7 % (ref 40–54)
HDLC SERPL-MCNC: 35 MG/DL (ref 40–75)
HDLC SERPL: 19.9 % (ref 20–50)
HGB BLD-MCNC: 16 G/DL (ref 14–18)
IMM GRANULOCYTES # BLD AUTO: 0.03 K/UL (ref 0–0.04)
IMM GRANULOCYTES NFR BLD AUTO: 0.3 % (ref 0–0.5)
LDLC SERPL CALC-MCNC: 76.8 MG/DL (ref 63–159)
LYMPHOCYTES # BLD AUTO: 2.9 K/UL (ref 1–4.8)
LYMPHOCYTES NFR BLD: 29.6 % (ref 18–48)
MCH RBC QN AUTO: 29 PG (ref 27–31)
MCHC RBC AUTO-ENTMCNC: 32.9 G/DL (ref 32–36)
MCV RBC AUTO: 88 FL (ref 82–98)
MONOCYTES # BLD AUTO: 0.6 K/UL (ref 0.3–1)
MONOCYTES NFR BLD: 6.4 % (ref 4–15)
NEUTROPHILS # BLD AUTO: 5.8 K/UL (ref 1.8–7.7)
NEUTROPHILS NFR BLD: 59.4 % (ref 38–73)
NONHDLC SERPL-MCNC: 141 MG/DL
NRBC BLD-RTO: 0 /100 WBC
PLATELET # BLD AUTO: 294 K/UL (ref 150–450)
PMV BLD AUTO: 10.1 FL (ref 9.2–12.9)
POTASSIUM SERPL-SCNC: 4.8 MMOL/L (ref 3.5–5.1)
PROT SERPL-MCNC: 7.1 G/DL (ref 6–8.4)
RBC # BLD AUTO: 5.52 M/UL (ref 4.6–6.2)
SODIUM SERPL-SCNC: 142 MMOL/L (ref 136–145)
TRIGL SERPL-MCNC: 321 MG/DL (ref 30–150)
WBC # BLD AUTO: 9.73 K/UL (ref 3.9–12.7)

## 2023-01-11 PROCEDURE — 1160F RVW MEDS BY RX/DR IN RCRD: CPT | Mod: CPTII,,, | Performed by: STUDENT IN AN ORGANIZED HEALTH CARE EDUCATION/TRAINING PROGRAM

## 2023-01-11 PROCEDURE — 83036 HEMOGLOBIN GLYCOSYLATED A1C: CPT | Performed by: STUDENT IN AN ORGANIZED HEALTH CARE EDUCATION/TRAINING PROGRAM

## 2023-01-11 PROCEDURE — 99215 OFFICE O/P EST HI 40 MIN: CPT | Mod: PBBFAC,PO | Performed by: STUDENT IN AN ORGANIZED HEALTH CARE EDUCATION/TRAINING PROGRAM

## 2023-01-11 PROCEDURE — 85025 COMPLETE CBC W/AUTO DIFF WBC: CPT | Performed by: STUDENT IN AN ORGANIZED HEALTH CARE EDUCATION/TRAINING PROGRAM

## 2023-01-11 PROCEDURE — 1159F PR MEDICATION LIST DOCUMENTED IN MEDICAL RECORD: ICD-10-PCS | Mod: CPTII,,, | Performed by: STUDENT IN AN ORGANIZED HEALTH CARE EDUCATION/TRAINING PROGRAM

## 2023-01-11 PROCEDURE — 36415 COLL VENOUS BLD VENIPUNCTURE: CPT | Mod: PO | Performed by: STUDENT IN AN ORGANIZED HEALTH CARE EDUCATION/TRAINING PROGRAM

## 2023-01-11 PROCEDURE — 80061 LIPID PANEL: CPT | Performed by: STUDENT IN AN ORGANIZED HEALTH CARE EDUCATION/TRAINING PROGRAM

## 2023-01-11 PROCEDURE — 3079F PR MOST RECENT DIASTOLIC BLOOD PRESSURE 80-89 MM HG: ICD-10-PCS | Mod: CPTII,,, | Performed by: STUDENT IN AN ORGANIZED HEALTH CARE EDUCATION/TRAINING PROGRAM

## 2023-01-11 PROCEDURE — 93005 ELECTROCARDIOGRAM TRACING: CPT | Mod: PBBFAC,PO | Performed by: INTERNAL MEDICINE

## 2023-01-11 PROCEDURE — 3008F BODY MASS INDEX DOCD: CPT | Mod: CPTII,,, | Performed by: STUDENT IN AN ORGANIZED HEALTH CARE EDUCATION/TRAINING PROGRAM

## 2023-01-11 PROCEDURE — 99214 OFFICE O/P EST MOD 30 MIN: CPT | Mod: 25,S$PBB,, | Performed by: STUDENT IN AN ORGANIZED HEALTH CARE EDUCATION/TRAINING PROGRAM

## 2023-01-11 PROCEDURE — 99406 PR TOBACCO USE CESSATION INTERMEDIATE 3-10 MINUTES: ICD-10-PCS | Mod: S$PBB,,, | Performed by: STUDENT IN AN ORGANIZED HEALTH CARE EDUCATION/TRAINING PROGRAM

## 2023-01-11 PROCEDURE — 99999 PR PBB SHADOW E&M-EST. PATIENT-LVL V: ICD-10-PCS | Mod: PBBFAC,,, | Performed by: STUDENT IN AN ORGANIZED HEALTH CARE EDUCATION/TRAINING PROGRAM

## 2023-01-11 PROCEDURE — 99999 PR PBB SHADOW E&M-EST. PATIENT-LVL V: CPT | Mod: PBBFAC,,, | Performed by: STUDENT IN AN ORGANIZED HEALTH CARE EDUCATION/TRAINING PROGRAM

## 2023-01-11 PROCEDURE — 99406 BEHAV CHNG SMOKING 3-10 MIN: CPT | Mod: S$PBB,,, | Performed by: STUDENT IN AN ORGANIZED HEALTH CARE EDUCATION/TRAINING PROGRAM

## 2023-01-11 PROCEDURE — 99214 PR OFFICE/OUTPT VISIT, EST, LEVL IV, 30-39 MIN: ICD-10-PCS | Mod: 25,S$PBB,, | Performed by: STUDENT IN AN ORGANIZED HEALTH CARE EDUCATION/TRAINING PROGRAM

## 2023-01-11 PROCEDURE — 3079F DIAST BP 80-89 MM HG: CPT | Mod: CPTII,,, | Performed by: STUDENT IN AN ORGANIZED HEALTH CARE EDUCATION/TRAINING PROGRAM

## 2023-01-11 PROCEDURE — 3075F SYST BP GE 130 - 139MM HG: CPT | Mod: CPTII,,, | Performed by: STUDENT IN AN ORGANIZED HEALTH CARE EDUCATION/TRAINING PROGRAM

## 2023-01-11 PROCEDURE — 1159F MED LIST DOCD IN RCRD: CPT | Mod: CPTII,,, | Performed by: STUDENT IN AN ORGANIZED HEALTH CARE EDUCATION/TRAINING PROGRAM

## 2023-01-11 PROCEDURE — 1160F PR REVIEW ALL MEDS BY PRESCRIBER/CLIN PHARMACIST DOCUMENTED: ICD-10-PCS | Mod: CPTII,,, | Performed by: STUDENT IN AN ORGANIZED HEALTH CARE EDUCATION/TRAINING PROGRAM

## 2023-01-11 PROCEDURE — 93010 EKG 12-LEAD: ICD-10-PCS | Mod: S$PBB,,, | Performed by: INTERNAL MEDICINE

## 2023-01-11 PROCEDURE — 93010 ELECTROCARDIOGRAM REPORT: CPT | Mod: S$PBB,,, | Performed by: INTERNAL MEDICINE

## 2023-01-11 PROCEDURE — 3075F PR MOST RECENT SYSTOLIC BLOOD PRESS GE 130-139MM HG: ICD-10-PCS | Mod: CPTII,,, | Performed by: STUDENT IN AN ORGANIZED HEALTH CARE EDUCATION/TRAINING PROGRAM

## 2023-01-11 PROCEDURE — 80053 COMPREHEN METABOLIC PANEL: CPT | Performed by: STUDENT IN AN ORGANIZED HEALTH CARE EDUCATION/TRAINING PROGRAM

## 2023-01-11 PROCEDURE — 3008F PR BODY MASS INDEX (BMI) DOCUMENTED: ICD-10-PCS | Mod: CPTII,,, | Performed by: STUDENT IN AN ORGANIZED HEALTH CARE EDUCATION/TRAINING PROGRAM

## 2023-01-11 RX ORDER — CLONAZEPAM 1 MG/1
1 TABLET ORAL NIGHTLY PRN
COMMUNITY
Start: 2022-08-31

## 2023-01-11 RX ORDER — NALOXONE HYDROCHLORIDE 4 MG/.1ML
SPRAY NASAL
COMMUNITY
Start: 2022-11-26

## 2023-01-11 RX ORDER — BUPRENORPHINE HYDROCHLORIDE, NALOXONE HYDROCHLORIDE 8; 2 MG/1; MG/1
FILM, SOLUBLE BUCCAL; SUBLINGUAL
COMMUNITY
Start: 2022-12-24

## 2023-01-11 RX ORDER — ROSUVASTATIN CALCIUM 10 MG/1
10 TABLET, COATED ORAL DAILY
Qty: 90 TABLET | Refills: 3 | Status: SHIPPED | OUTPATIENT
Start: 2023-01-11 | End: 2023-06-27 | Stop reason: SDUPTHER

## 2023-01-11 NOTE — PROGRESS NOTES
"[facility] CLINIC NOTE    Patient Name: Kal Dodd  YOB: 1960    PRESENTING HISTORY     History of Present Illness:  MrNeha Dodd is a 62 y.o. male here for 6 mo f/u after establishing with my PA.    HFrEF- not checked recently. Doesn't follow with a cardiologist.   On Acei, Statin, 81mg ASA.   Stopped all his other meds at some point in the past.     CAD- with 5 stents in place.     Active tobacco use  1 pack every 1.5 days.    Thought about quitting before, not there right now.    Spent 3 minutes discussing health implications.    He is pre-contemplative.     Winded quickly for several months.   Got winded walking from the parking lot to check in desk.     No labs in 2.5 years.   He didn't get labs drawn after last 2 OV. Had a bad experience a few years ago requiring numerous sticks. After some discussion open to doing it today. I walked him over the the lab.       ROS      OBJECTIVE:   Vital Signs:  Vitals:    01/11/23 1045   BP: 132/86   Pulse: 72   Resp: 18   SpO2: 95%   Weight: 87.6 kg (193 lb 2 oz)   Height: 5' 6" (1.676 m)            Physical Exam  Vitals and nursing note reviewed.   Constitutional:       Appearance: He is not diaphoretic.   HENT:      Head: Normocephalic and atraumatic.      Right Ear: External ear normal.      Left Ear: External ear normal.   Eyes:      General: No scleral icterus.     Conjunctiva/sclera: Conjunctivae normal.      Pupils: Pupils are equal, round, and reactive to light.   Neck:      Thyroid: No thyromegaly.   Cardiovascular:      Rate and Rhythm: Normal rate and regular rhythm.      Heart sounds: Normal heart sounds. No murmur heard.  Pulmonary:      Effort: Pulmonary effort is normal. No respiratory distress.      Breath sounds: Wheezing present. No rales.   Musculoskeletal:         General: No tenderness or deformity. Normal range of motion.      Cervical back: Normal range of motion and neck supple.      Right lower leg: No " edema.      Left lower leg: No edema.   Lymphadenopathy:      Cervical: No cervical adenopathy.   Skin:     General: Skin is warm and dry.      Findings: No erythema or rash.   Neurological:      Mental Status: He is alert and oriented to person, place, and time.      Gait: Gait is intact.   Psychiatric:         Mood and Affect: Mood and affect normal.         Cognition and Memory: Memory normal.         Judgment: Judgment normal.       ASSESSMENT & PLAN:     Chronic obstructive pulmonary disease, unspecified COPD type  -     Complete PFT w/ bronchodilator; Future  -     Lipid Panel; Future; Expected date: 01/11/2023  -     Comprehensive Metabolic Panel; Future; Expected date: 01/11/2023  -     CBC Auto Differential; Future; Expected date: 01/11/2023  -     Hemoglobin A1C; Future; Expected date: 01/11/2023    Mixed hyperlipidemia  -     rosuvastatin (CRESTOR) 10 MG tablet; Take 1 tablet (10 mg total) by mouth once daily.  Dispense: 90 tablet; Refill: 3    Heart failure with reduced ejection fraction  -     Echo; Future  -     Lipid Panel; Future; Expected date: 01/11/2023  -     Comprehensive Metabolic Panel; Future; Expected date: 01/11/2023  -     CBC Auto Differential; Future; Expected date: 01/11/2023  -     Hemoglobin A1C; Future; Expected date: 01/11/2023  -     IN OFFICE EKG 12-LEAD (to Muse)    Type 2 diabetes mellitus with other circulatory complication, without long-term current use of insulin  Needs reassessment    Tobacco use  See discussion above.     Chronic narcotic dependence  Has Narcan rx    62 M. His health is tenuous.     Likely COPD as etiology of SOB based on lung exam. Based on blood testing will treat with prednisone x 5 days, then start on inhaler.     Cardiac etiology not ruled out. Start with workup above but probably needs coronary evaluation. Will try to ease him into this so he maintains adherence to treatment plan.     Strongly recommended he quit smoking. Not there yet.        Emigdio  MD Abdiaziz   Internal Medicine

## 2023-01-11 NOTE — PATIENT INSTRUCTIONS
Uri Bowden,     If you are due for any health screening(s) below please notify me so we can arrange them to be ordered and scheduled to maintain your health. Most healthy patients complete it. Don't lose out on improving your health.     Tests to Keep You Healthy    Colon Cancer Screening: DUE  Last Blood Pressure <= 139/89 (1/11/2023): Yes  Tobacco Cessation: NO         Colon Cancer Screening    Of cancers affecting both men and women, colorectal cancer is the third leading cancer killer in the [place]. But it doesnt have to be. Screening can prevent colorectal cancer or find it at an early stage when treatment often leads to a cure.    A colonoscopy is the preferred test for detecting colon cancer. It is needed only once every 10 years if results are negative. While sedated, a flexible, lighted tube with a tiny camera is inserted into the rectum and advanced through the colon to look for cancers. An alternative screening test that is used at home and returned to the lab may also be used. It detects hidden blood in bowel movements which could indicate cancer in the colon. If results are positive, you will need a colonoscopy to determine if the blood is a sign of cancer. This type of follow up (diagnostic) colonoscopy usually requires additional copays as required by your insurance provider. Please contact your PCP if you have any questions.    Although you are still overdue for this important screening, due to the COVID-19 pandemic, we recommend scheduling it in the near future.          Diabetic A1c Testing    Your chart identifies you as having diabetes. It is recommended that all diabetes patients have an A1c test done at least once a year, but [facility] recommends twice a year for most patients. This helps your doctor to better help you manage your diabetes. This is a non-fasting lab test which can be completed at any time. Your result will be sent to your Primary Care Provider for review and  that office will contact you with the results.

## 2023-01-12 DIAGNOSIS — J44.1 COPD EXACERBATION: Primary | ICD-10-CM

## 2023-01-12 DIAGNOSIS — E11.9 TYPE 2 DIABETES MELLITUS WITHOUT COMPLICATION, WITHOUT LONG-TERM CURRENT USE OF INSULIN: ICD-10-CM

## 2023-01-12 DIAGNOSIS — J44.9 CHRONIC OBSTRUCTIVE PULMONARY DISEASE, UNSPECIFIED COPD TYPE: ICD-10-CM

## 2023-01-12 RX ORDER — ALBUTEROL SULFATE 90 UG/1
2 AEROSOL, METERED RESPIRATORY (INHALATION) EVERY 6 HOURS PRN
Qty: 18 G | Refills: 0 | Status: SHIPPED | OUTPATIENT
Start: 2023-01-12 | End: 2023-02-08 | Stop reason: SDUPTHER

## 2023-01-12 RX ORDER — PREDNISONE 10 MG/1
10 TABLET ORAL DAILY
Qty: 3 TABLET | Refills: 0 | Status: SHIPPED | OUTPATIENT
Start: 2023-01-12 | End: 2023-02-08 | Stop reason: SDUPTHER

## 2023-01-12 RX ORDER — METFORMIN HYDROCHLORIDE 500 MG/1
500 TABLET, EXTENDED RELEASE ORAL
Qty: 90 TABLET | Refills: 3 | Status: SHIPPED | OUTPATIENT
Start: 2023-01-12 | End: 2024-01-11 | Stop reason: SDUPTHER

## 2023-01-25 DIAGNOSIS — E11.9 TYPE 2 DIABETES MELLITUS WITHOUT COMPLICATION, UNSPECIFIED WHETHER LONG TERM INSULIN USE: ICD-10-CM

## 2023-02-08 ENCOUNTER — OFFICE VISIT (OUTPATIENT)
Dept: FAMILY MEDICINE | Facility: CLINIC | Age: 63
End: 2023-02-08
Payer: MEDICAID

## 2023-02-08 VITALS
SYSTOLIC BLOOD PRESSURE: 124 MMHG | HEIGHT: 66 IN | HEART RATE: 74 BPM | BODY MASS INDEX: 30.9 KG/M2 | WEIGHT: 192.25 LBS | OXYGEN SATURATION: 94 % | DIASTOLIC BLOOD PRESSURE: 82 MMHG | RESPIRATION RATE: 18 BRPM

## 2023-02-08 DIAGNOSIS — I50.22 CHRONIC SYSTOLIC CONGESTIVE HEART FAILURE: ICD-10-CM

## 2023-02-08 DIAGNOSIS — E11.9 TYPE 2 DIABETES MELLITUS WITHOUT COMPLICATION, WITHOUT LONG-TERM CURRENT USE OF INSULIN: ICD-10-CM

## 2023-02-08 DIAGNOSIS — Z00.00 ROUTINE GENERAL MEDICAL EXAMINATION AT A HEALTH CARE FACILITY: Primary | ICD-10-CM

## 2023-02-08 DIAGNOSIS — J44.1 COPD EXACERBATION: ICD-10-CM

## 2023-02-08 DIAGNOSIS — Z72.0 TOBACCO USE: ICD-10-CM

## 2023-02-08 PROCEDURE — 1160F PR REVIEW ALL MEDS BY PRESCRIBER/CLIN PHARMACIST DOCUMENTED: ICD-10-PCS | Mod: CPTII,,, | Performed by: STUDENT IN AN ORGANIZED HEALTH CARE EDUCATION/TRAINING PROGRAM

## 2023-02-08 PROCEDURE — 4010F ACE/ARB THERAPY RXD/TAKEN: CPT | Mod: CPTII,,, | Performed by: STUDENT IN AN ORGANIZED HEALTH CARE EDUCATION/TRAINING PROGRAM

## 2023-02-08 PROCEDURE — 3051F PR MOST RECENT HEMOGLOBIN A1C LEVEL 7.0 - < 8.0%: ICD-10-PCS | Mod: CPTII,,, | Performed by: STUDENT IN AN ORGANIZED HEALTH CARE EDUCATION/TRAINING PROGRAM

## 2023-02-08 PROCEDURE — 99406 BEHAV CHNG SMOKING 3-10 MIN: CPT | Mod: S$PBB,,, | Performed by: STUDENT IN AN ORGANIZED HEALTH CARE EDUCATION/TRAINING PROGRAM

## 2023-02-08 PROCEDURE — 1159F PR MEDICATION LIST DOCUMENTED IN MEDICAL RECORD: ICD-10-PCS | Mod: CPTII,,, | Performed by: STUDENT IN AN ORGANIZED HEALTH CARE EDUCATION/TRAINING PROGRAM

## 2023-02-08 PROCEDURE — 1160F RVW MEDS BY RX/DR IN RCRD: CPT | Mod: CPTII,,, | Performed by: STUDENT IN AN ORGANIZED HEALTH CARE EDUCATION/TRAINING PROGRAM

## 2023-02-08 PROCEDURE — 4010F PR ACE/ARB THEARPY RXD/TAKEN: ICD-10-PCS | Mod: CPTII,,, | Performed by: STUDENT IN AN ORGANIZED HEALTH CARE EDUCATION/TRAINING PROGRAM

## 2023-02-08 PROCEDURE — 3079F DIAST BP 80-89 MM HG: CPT | Mod: CPTII,,, | Performed by: STUDENT IN AN ORGANIZED HEALTH CARE EDUCATION/TRAINING PROGRAM

## 2023-02-08 PROCEDURE — 99214 OFFICE O/P EST MOD 30 MIN: CPT | Mod: S$PBB,25,, | Performed by: STUDENT IN AN ORGANIZED HEALTH CARE EDUCATION/TRAINING PROGRAM

## 2023-02-08 PROCEDURE — 99214 PR OFFICE/OUTPT VISIT, EST, LEVL IV, 30-39 MIN: ICD-10-PCS | Mod: S$PBB,25,, | Performed by: STUDENT IN AN ORGANIZED HEALTH CARE EDUCATION/TRAINING PROGRAM

## 2023-02-08 PROCEDURE — 3008F BODY MASS INDEX DOCD: CPT | Mod: CPTII,,, | Performed by: STUDENT IN AN ORGANIZED HEALTH CARE EDUCATION/TRAINING PROGRAM

## 2023-02-08 PROCEDURE — 99214 OFFICE O/P EST MOD 30 MIN: CPT | Mod: PBBFAC,PO | Performed by: STUDENT IN AN ORGANIZED HEALTH CARE EDUCATION/TRAINING PROGRAM

## 2023-02-08 PROCEDURE — 1159F MED LIST DOCD IN RCRD: CPT | Mod: CPTII,,, | Performed by: STUDENT IN AN ORGANIZED HEALTH CARE EDUCATION/TRAINING PROGRAM

## 2023-02-08 PROCEDURE — 99999 PR PBB SHADOW E&M-EST. PATIENT-LVL IV: CPT | Mod: PBBFAC,,, | Performed by: STUDENT IN AN ORGANIZED HEALTH CARE EDUCATION/TRAINING PROGRAM

## 2023-02-08 PROCEDURE — 3074F PR MOST RECENT SYSTOLIC BLOOD PRESSURE < 130 MM HG: ICD-10-PCS | Mod: CPTII,,, | Performed by: STUDENT IN AN ORGANIZED HEALTH CARE EDUCATION/TRAINING PROGRAM

## 2023-02-08 PROCEDURE — 99406 PR TOBACCO USE CESSATION INTERMEDIATE 3-10 MINUTES: ICD-10-PCS | Mod: S$PBB,,, | Performed by: STUDENT IN AN ORGANIZED HEALTH CARE EDUCATION/TRAINING PROGRAM

## 2023-02-08 PROCEDURE — 3008F PR BODY MASS INDEX (BMI) DOCUMENTED: ICD-10-PCS | Mod: CPTII,,, | Performed by: STUDENT IN AN ORGANIZED HEALTH CARE EDUCATION/TRAINING PROGRAM

## 2023-02-08 PROCEDURE — 3079F PR MOST RECENT DIASTOLIC BLOOD PRESSURE 80-89 MM HG: ICD-10-PCS | Mod: CPTII,,, | Performed by: STUDENT IN AN ORGANIZED HEALTH CARE EDUCATION/TRAINING PROGRAM

## 2023-02-08 PROCEDURE — 3074F SYST BP LT 130 MM HG: CPT | Mod: CPTII,,, | Performed by: STUDENT IN AN ORGANIZED HEALTH CARE EDUCATION/TRAINING PROGRAM

## 2023-02-08 PROCEDURE — 3051F HG A1C>EQUAL 7.0%<8.0%: CPT | Mod: CPTII,,, | Performed by: STUDENT IN AN ORGANIZED HEALTH CARE EDUCATION/TRAINING PROGRAM

## 2023-02-08 PROCEDURE — 99999 PR PBB SHADOW E&M-EST. PATIENT-LVL IV: ICD-10-PCS | Mod: PBBFAC,,, | Performed by: STUDENT IN AN ORGANIZED HEALTH CARE EDUCATION/TRAINING PROGRAM

## 2023-02-08 RX ORDER — ALBUTEROL SULFATE 90 UG/1
2 AEROSOL, METERED RESPIRATORY (INHALATION) EVERY 4 HOURS PRN
Qty: 54 G | Refills: 3 | Status: SHIPPED | OUTPATIENT
Start: 2023-02-08 | End: 2024-01-11 | Stop reason: SDUPTHER

## 2023-02-08 RX ORDER — PREDNISONE 10 MG/1
10 TABLET ORAL DAILY
Qty: 3 TABLET | Refills: 0 | Status: SHIPPED | OUTPATIENT
Start: 2023-02-08 | End: 2023-02-11

## 2023-02-08 NOTE — PATIENT INSTRUCTIONS
Uri Bowden,     If you are due for any health screening(s) below please notify me so we can arrange them to be ordered and scheduled to maintain your health. Most healthy patients complete it. Don't lose out on improving your health.     Tests to Keep You Healthy    Eye Exam: ORDERED BUT NOT SCHEDULED  Colon Cancer Screening: DUE  Last Blood Pressure <= 139/89 (2/8/2023): Yes  Last HbA1c < 8 (01/11/2023): Yes  Tobacco Cessation: NO         Colon Cancer Screening    Of cancers affecting both men and women, colorectal cancer is the third leading cancer killer in the [place]. But it doesnt have to be. Screening can prevent colorectal cancer or find it at an early stage when treatment often leads to a cure.    A colonoscopy is the preferred test for detecting colon cancer. It is needed only once every 10 years if results are negative. While sedated, a flexible, lighted tube with a tiny camera is inserted into the rectum and advanced through the colon to look for cancers. An alternative screening test that is used at home and returned to the lab may also be used. It detects hidden blood in bowel movements which could indicate cancer in the colon. If results are positive, you will need a colonoscopy to determine if the blood is a sign of cancer. This type of follow up (diagnostic) colonoscopy usually requires additional copays as required by your insurance provider. Please contact your PCP if you have any questions.    Although you are still overdue for this important screening, due to the COVID-19 pandemic, we recommend scheduling it in the near future.       Diabetic Retinal Eye Exam    Diabetes is the #1 cause of blindness in the [place] - early detection before signs or symptoms develop can prevent debilitating blindness.    Once-a-year screening is recommended for all diabetic patients. This exam can prevent and treat diabetes complications in the eye before developing symptoms. This can be done with a special  camera is used to take photographs of the back of your eye without having to dilate them, or you can see an eye doctor for a full dilated exam.    Although you are still overdue for this important screening, due to the COVID-19 pandemic, we recommend scheduling it in the near future.

## 2023-02-08 NOTE — PROGRESS NOTES
"[facility] CLINIC NOTE    Patient Name: Kal Dodd  YOB: 1960    PRESENTING HISTORY     History of Present Illness:  MrNeha Dodd is a 62 y.o. male here for f/u.     His breathing feels better with albuterol.   For some reason he didn't get the Trelegy, will try to look into that.   Took pred for 3 days. Instructed on use during flares.     Missed PFTs.     Tobacco use- enjoys it.    Contemplative. Will think about cutting back.    Currently 1ppd.    We did not discuss LDCT screening today.     Didn't get Echo. He is willing.     Doing fine with meds.     T2DM- on metformin will not be overly aggressive. Stay with low dose metformin for now.     HLD- with markedly elevated triglycerides. Better control. Will again, not be too aggressive. Adherence has been an issue in the past.     HFrEF- needs recheck. Low dose Acei.           ROS      OBJECTIVE:   Vital Signs:  Vitals:    02/08/23 1517   BP: 124/82   Pulse: 74   Resp: 18   SpO2: (!) 94%   Weight: 87.2 kg (192 lb 3.9 oz)   Height: 5' 6" (1.676 m)            Physical Exam  Vitals and nursing note reviewed.   Constitutional:       Appearance: He is not diaphoretic.   HENT:      Head: Normocephalic and atraumatic.      Right Ear: External ear normal.      Left Ear: External ear normal.   Eyes:      General: No scleral icterus.     Conjunctiva/sclera: Conjunctivae normal.      Pupils: Pupils are equal, round, and reactive to light.   Neck:      Thyroid: No thyromegaly.   Cardiovascular:      Rate and Rhythm: Normal rate and regular rhythm.      Heart sounds: Normal heart sounds. No murmur heard.  Pulmonary:      Effort: Pulmonary effort is normal. No respiratory distress.      Breath sounds: Wheezing present. No rales.   Musculoskeletal:         General: No tenderness or deformity. Normal range of motion.      Cervical back: Normal range of motion and neck supple.      Right lower leg: No edema.      Left lower leg: No " edema.   Lymphadenopathy:      Cervical: No cervical adenopathy.   Skin:     General: Skin is warm and dry.      Findings: No erythema or rash.   Neurological:      Mental Status: He is alert and oriented to person, place, and time.      Gait: Gait is intact.   Psychiatric:         Mood and Affect: Mood and affect normal.         Cognition and Memory: Memory normal.         Judgment: Judgment normal.     ASSESSMENT & PLAN:     Routine general medical examination at a health care facility    COPD exacerbation  -     albuterol (PROVENTIL HFA) 90 mcg/actuation inhaler; Inhale 2 puffs into the lungs every 4 (four) hours as needed for Wheezing. Rescue  Dispense: 54 g; Refill: 3  -     predniSONE (DELTASONE) 10 MG tablet; Take 1 tablet (10 mg total) by mouth once daily. for 3 days  Dispense: 3 tablet; Refill: 0    Tobacco use  Discussed for 4 minutes today. He is making progress in state of change. Open to cutting back.     Chronic systolic congestive heart failure  Needs reassessment. Continue Acei    Type 2 diabetes mellitus without complication, without long-term current use of insulin  See discussion above.           Emigdio Freed MD   Internal Medicine

## 2023-02-13 ENCOUNTER — TELEPHONE (OUTPATIENT)
Dept: FAMILY MEDICINE | Facility: CLINIC | Age: 63
End: 2023-02-13
Payer: MEDICAID

## 2023-02-13 DIAGNOSIS — J44.9 CHRONIC OBSTRUCTIVE PULMONARY DISEASE, UNSPECIFIED COPD TYPE: Primary | ICD-10-CM

## 2023-02-13 RX ORDER — FLUTICASONE PROPIONATE AND SALMETEROL 100; 50 UG/1; UG/1
1 POWDER RESPIRATORY (INHALATION) 2 TIMES DAILY
Qty: 60 EACH | Refills: 3 | Status: SHIPPED | OUTPATIENT
Start: 2023-02-13 | End: 2023-07-03

## 2023-02-13 NOTE — TELEPHONE ENCOUNTER
"PA for Nick Richter has been denied by insurance. States  "Product is a plan exclusion"    The following list is from plans covered medication list.       Please advise.   "

## 2023-05-10 ENCOUNTER — OFFICE VISIT (OUTPATIENT)
Dept: FAMILY MEDICINE | Facility: CLINIC | Age: 63
End: 2023-05-10
Payer: MEDICAID

## 2023-05-10 VITALS
DIASTOLIC BLOOD PRESSURE: 80 MMHG | TEMPERATURE: 98 F | BODY MASS INDEX: 30.33 KG/M2 | SYSTOLIC BLOOD PRESSURE: 132 MMHG | RESPIRATION RATE: 16 BRPM | HEART RATE: 66 BPM | OXYGEN SATURATION: 95 % | HEIGHT: 66 IN | WEIGHT: 188.69 LBS

## 2023-05-10 DIAGNOSIS — Z72.0 TOBACCO USE: ICD-10-CM

## 2023-05-10 DIAGNOSIS — E11.9 TYPE 2 DIABETES MELLITUS WITHOUT COMPLICATION, WITHOUT LONG-TERM CURRENT USE OF INSULIN: ICD-10-CM

## 2023-05-10 DIAGNOSIS — E11.69 HYPERLIPIDEMIA ASSOCIATED WITH TYPE 2 DIABETES MELLITUS: ICD-10-CM

## 2023-05-10 DIAGNOSIS — E11.59 HYPERTENSION ASSOCIATED WITH DIABETES: ICD-10-CM

## 2023-05-10 DIAGNOSIS — J44.9 CHRONIC OBSTRUCTIVE PULMONARY DISEASE, UNSPECIFIED COPD TYPE: Primary | ICD-10-CM

## 2023-05-10 DIAGNOSIS — I50.20 HEART FAILURE WITH REDUCED EJECTION FRACTION: ICD-10-CM

## 2023-05-10 DIAGNOSIS — I15.2 HYPERTENSION ASSOCIATED WITH DIABETES: ICD-10-CM

## 2023-05-10 DIAGNOSIS — E78.5 HYPERLIPIDEMIA ASSOCIATED WITH TYPE 2 DIABETES MELLITUS: ICD-10-CM

## 2023-05-10 PROCEDURE — 3079F PR MOST RECENT DIASTOLIC BLOOD PRESSURE 80-89 MM HG: ICD-10-PCS | Mod: CPTII,,,

## 2023-05-10 PROCEDURE — 4010F ACE/ARB THERAPY RXD/TAKEN: CPT | Mod: CPTII,,,

## 2023-05-10 PROCEDURE — 1160F RVW MEDS BY RX/DR IN RCRD: CPT | Mod: CPTII,,,

## 2023-05-10 PROCEDURE — 1160F PR REVIEW ALL MEDS BY PRESCRIBER/CLIN PHARMACIST DOCUMENTED: ICD-10-PCS | Mod: CPTII,,,

## 2023-05-10 PROCEDURE — 1159F PR MEDICATION LIST DOCUMENTED IN MEDICAL RECORD: ICD-10-PCS | Mod: CPTII,,,

## 2023-05-10 PROCEDURE — 99999 PR PBB SHADOW E&M-EST. PATIENT-LVL IV: CPT | Mod: PBBFAC,,,

## 2023-05-10 PROCEDURE — 99214 OFFICE O/P EST MOD 30 MIN: CPT | Mod: S$PBB,,,

## 2023-05-10 PROCEDURE — 3051F HG A1C>EQUAL 7.0%<8.0%: CPT | Mod: CPTII,,,

## 2023-05-10 PROCEDURE — 99214 OFFICE O/P EST MOD 30 MIN: CPT | Mod: PBBFAC,PO

## 2023-05-10 PROCEDURE — 3079F DIAST BP 80-89 MM HG: CPT | Mod: CPTII,,,

## 2023-05-10 PROCEDURE — 3008F BODY MASS INDEX DOCD: CPT | Mod: CPTII,,,

## 2023-05-10 PROCEDURE — 3008F PR BODY MASS INDEX (BMI) DOCUMENTED: ICD-10-PCS | Mod: CPTII,,,

## 2023-05-10 PROCEDURE — 3051F PR MOST RECENT HEMOGLOBIN A1C LEVEL 7.0 - < 8.0%: ICD-10-PCS | Mod: CPTII,,,

## 2023-05-10 PROCEDURE — 4010F PR ACE/ARB THEARPY RXD/TAKEN: ICD-10-PCS | Mod: CPTII,,,

## 2023-05-10 PROCEDURE — 99999 PR PBB SHADOW E&M-EST. PATIENT-LVL IV: ICD-10-PCS | Mod: PBBFAC,,,

## 2023-05-10 PROCEDURE — 3075F SYST BP GE 130 - 139MM HG: CPT | Mod: CPTII,,,

## 2023-05-10 PROCEDURE — 99214 PR OFFICE/OUTPT VISIT, EST, LEVL IV, 30-39 MIN: ICD-10-PCS | Mod: S$PBB,,,

## 2023-05-10 PROCEDURE — 3075F PR MOST RECENT SYSTOLIC BLOOD PRESS GE 130-139MM HG: ICD-10-PCS | Mod: CPTII,,,

## 2023-05-10 PROCEDURE — 1159F MED LIST DOCD IN RCRD: CPT | Mod: CPTII,,,

## 2023-05-10 NOTE — PROGRESS NOTES
"Subjective:       Patient ID: Kal Dodd is a 63 y.o. male.    Chief Complaint: Follow-up    Routine follow up chronic medical conditions.    COPD. Advair helping a good bit since starting. Using albuterol as well with relief. Smoking 1ppd still. Knows that he should quit. Wishes to hold off on LDCT for lung cancer screening. Says that he will consider at a later date.     HFrEF. Needs echo to recheck as it's been years. He's willing to schedule.     HTN. Stable. Continue meds.    DM. Taking metformin as prescribed. He checks his feet regularly without issues. No numbness or tingling.         Past Medical History:   Diagnosis Date    Coronary artery disease 4/26/12    "massive heart attack"    Heart attack     Hyperlipidemia     Hypertension        Review of patient's allergies indicates:  No Known Allergies      Current Outpatient Medications:     albuterol (PROVENTIL HFA) 90 mcg/actuation inhaler, Inhale 2 puffs into the lungs every 4 (four) hours as needed for Wheezing. Rescue, Disp: 54 g, Rfl: 3    aspirin (ECOTRIN) 81 MG EC tablet, Take 81 mg by mouth once daily.  , Disp: , Rfl:     clonazePAM (KLONOPIN) 1 MG tablet, Take 1 mg by mouth nightly as needed., Disp: , Rfl:     fluticasone-salmeterol diskus inhaler 100-50 mcg, Inhale 1 puff into the lungs 2 (two) times daily. Controller, Disp: 60 each, Rfl: 3    lisinopriL 10 MG tablet, Take 1 tablet (10 mg total) by mouth once daily., Disp: 90 tablet, Rfl: 3    metFORMIN (GLUCOPHAGE-XR) 500 MG ER 24hr tablet, Take 1 tablet (500 mg total) by mouth daily with breakfast., Disp: 90 tablet, Rfl: 3    naloxone (NARCAN) 4 mg/actuation Spry, SMARTSIG:Both Nares, Disp: , Rfl:     nitroGLYCERIN (NITROSTAT) 0.4 MG SL tablet, Place 1 tablet (0.4 mg total) under the tongue every 5 (five) minutes as needed for Chest pain., Disp: 25 tablet, Rfl: 1    rosuvastatin (CRESTOR) 10 MG tablet, Take 1 tablet (10 mg total) by mouth once daily., Disp: 90 tablet, Rfl: 3    SUBOXONE " "8-2 mg, Place under the tongue., Disp: , Rfl:     Review of Systems   Constitutional:  Negative for activity change, appetite change, chills, diaphoresis, fatigue, fever and unexpected weight change.   HENT:  Negative for congestion, ear pain, postnasal drip, rhinorrhea, sinus pressure, sneezing, sore throat and trouble swallowing.    Eyes:  Negative for pain, itching and visual disturbance.   Respiratory:  Positive for cough, shortness of breath and wheezing. Negative for chest tightness.    Cardiovascular:  Negative for chest pain, palpitations and leg swelling.   Gastrointestinal:  Negative for abdominal distention, abdominal pain, blood in stool, constipation, diarrhea, nausea and vomiting.   Endocrine: Negative for cold intolerance and heat intolerance.   Genitourinary:  Negative for difficulty urinating, dysuria, frequency, hematuria and urgency.   Musculoskeletal:  Negative for arthralgias, back pain, myalgias and neck pain.   Skin:  Negative for color change, pallor, rash and wound.   Neurological:  Negative for dizziness, syncope, weakness, numbness and headaches.   Hematological:  Negative for adenopathy. Bruises/bleeds easily.   Psychiatric/Behavioral:  Negative for behavioral problems. The patient is not nervous/anxious.      Objective:      /80 (BP Location: Right arm, Patient Position: Sitting, BP Method: Large (Manual))   Pulse 66   Temp 98.4 °F (36.9 °C) (Oral)   Resp 16   Ht 5' 6" (1.676 m)   Wt 85.6 kg (188 lb 11.4 oz)   SpO2 95%   BMI 30.46 kg/m²   Physical Exam  Vitals reviewed.   Constitutional:       General: He is not in acute distress.     Appearance: Normal appearance. He is obese. He is not ill-appearing, toxic-appearing or diaphoretic.   HENT:      Head: Normocephalic.      Right Ear: External ear normal.      Left Ear: External ear normal.      Nose: Nose normal. No congestion or rhinorrhea.      Mouth/Throat:      Mouth: Mucous membranes are moist.      Pharynx: Oropharynx " is clear.   Eyes:      General: No scleral icterus.        Right eye: No discharge.         Left eye: No discharge.      Extraocular Movements: Extraocular movements intact.      Conjunctiva/sclera: Conjunctivae normal.   Cardiovascular:      Rate and Rhythm: Normal rate and regular rhythm.      Pulses: Normal pulses.      Heart sounds: Normal heart sounds. No murmur heard.    No friction rub. No gallop.   Pulmonary:      Effort: Pulmonary effort is normal. No respiratory distress.      Breath sounds: No wheezing, rhonchi or rales.      Comments: Decreased breath sounds throughout    Chest:      Chest wall: No tenderness.   Musculoskeletal:         General: No swelling, tenderness or deformity. Normal range of motion.      Cervical back: Normal range of motion.      Right lower leg: No edema.      Left lower leg: No edema.   Skin:     General: Skin is warm and dry.      Capillary Refill: Capillary refill takes less than 2 seconds.      Coloration: Skin is not jaundiced.      Findings: No bruising, erythema, lesion or rash.   Neurological:      Mental Status: He is alert and oriented to person, place, and time.      Gait: Gait normal.   Psychiatric:         Mood and Affect: Mood normal.         Behavior: Behavior normal.         Thought Content: Thought content normal.         Judgment: Judgment normal.       Protective Sensation (w/ 10 gram monofilament):  Right: Intact  Left: Intact    Visual Inspection:  Normal -  Bilateral    Pedal Pulses:   Right: Present  Left: Present    Posterior Tibialis Pulses:   Right:Present  Left: Present  Assessment:       1. Chronic obstructive pulmonary disease, unspecified COPD type    2. Tobacco use    3. Type 2 diabetes mellitus without complication, without long-term current use of insulin    4. Heart failure with reduced ejection fraction    5. Hyperlipidemia associated with type 2 diabetes mellitus    6. Hypertension associated with diabetes        Plan:       Chronic  obstructive pulmonary disease, unspecified COPD type        -    Stable. Continue meds. Will continue to monitor.     Tobacco use        -    Contemplating quitting. Still 1ppd.     Type 2 diabetes mellitus without complication, without long-term current use of insulin  -     Comprehensive Metabolic Panel; Future; Expected date: 05/10/2023  -     Hemoglobin A1C; Future; Expected date: 05/10/2023  -     Microalbumin/Creatinine Ratio, Urine; Future; Expected date: 05/10/2023        -    Continue meds. Will continue to monitor.     Heart failure with reduced ejection fraction  -     Echo; Future    Hyperlipidemia associated with type 2 diabetes mellitus        -    Continue meds. Will continue to monitor.     Hypertension associated with diabetes  -     Comprehensive Metabolic Panel; Future; Expected date: 05/10/2023        -    Stable. Continue meds. Will continue to monitor.           6 months Abdiaziz Leung PA-C  Family Medicine Physician Assistant       I spent a total of 20 minutes on the day of the visit.This includes face to face time and non-face to face time preparing to see the patient (eg, review of tests), obtaining and/or reviewing separately obtained history, documenting clinical information in the electronic or other health record, independently interpreting results and communicating results to the patient/family/caregiver, or care coordinator.      We have addressed [4] Moderate: 1 or more chronic illnesses with exacerbation, progression, or side effects of treatment / 2 or more stable chronic illnesses / 1 undiagnosed new problem with uncertain prognosis / 1 acute illness with systemic symptoms / 1 acute complicated injury  The complexity of the data reviewed and analyzed for this visit was [3] Limited (Reviewed prior external note, ordered unique testing or reviewed the results of each unique test)   The risk of complications and/or morbidity or mortality are [4] Moderate risk (I.e.  prescription drug management / decision regarding minor surgery with identified pt or procedure risk factors / decision regarding elective major surgery without identified pt or procedure risk factors / diagnosis or treatment significantly limited by social determinants of health)   The level of Medical Decision Making for this visit is [4] Moderate

## 2023-05-10 NOTE — PATIENT INSTRUCTIONS
Uri Bowden,     If you are due for any health screening(s) below please notify me so we can arrange them to be ordered and scheduled to maintain your health. Most healthy patients complete it. Don't lose out on improving your health.     Tests to Keep You Healthy    Eye Exam: ORDERED BUT NOT SCHEDULED  Colon Cancer Screening: DUE  Last Blood Pressure <= 139/89 (5/10/2023): Yes  Last HbA1c < 8 (01/11/2023): Yes  Tobacco Cessation: NO         Colon Cancer Screening    Of cancers affecting both men and women, colorectal cancer is the third leading cancer killer in the [place]. But it doesnt have to be. Screening can prevent colorectal cancer or find it at an early stage when treatment often leads to a cure.    A colonoscopy is the preferred test for detecting colon cancer. It is needed only once every 10 years if results are negative. While sedated, a flexible, lighted tube with a tiny camera is inserted into the rectum and advanced through the colon to look for cancers. An alternative screening test that is used at home and returned to the lab may also be used. It detects hidden blood in bowel movements which could indicate cancer in the colon. If results are positive, you will need a colonoscopy to determine if the blood is a sign of cancer. This type of follow up (diagnostic) colonoscopy usually requires additional copays as required by your insurance provider. Please contact your PCP if you have any questions.         Diabetic Retinal Eye Exam    Diabetes is the #1 cause of blindness in the [place] - early detection before signs or symptoms develop can prevent debilitating blindness.    Once-a-year screening is recommended for all diabetic patients. This exam can prevent and treat diabetes complications in the eye before developing symptoms. This can be done with a special camera is used to take photographs of the back of your eye without having to dilate them, or you can see an eye doctor for a full dilated  exam.

## 2023-06-27 DIAGNOSIS — E78.2 MIXED HYPERLIPIDEMIA: ICD-10-CM

## 2023-06-27 DIAGNOSIS — I15.2 HYPERTENSION ASSOCIATED WITH DIABETES: ICD-10-CM

## 2023-06-27 DIAGNOSIS — E11.59 HYPERTENSION ASSOCIATED WITH DIABETES: ICD-10-CM

## 2023-06-27 RX ORDER — LISINOPRIL 10 MG/1
10 TABLET ORAL DAILY
Qty: 90 TABLET | Refills: 3 | Status: SHIPPED | OUTPATIENT
Start: 2023-06-27

## 2023-06-27 RX ORDER — ROSUVASTATIN CALCIUM 10 MG/1
10 TABLET, COATED ORAL DAILY
Qty: 90 TABLET | Refills: 3 | Status: SHIPPED | OUTPATIENT
Start: 2023-06-27 | End: 2024-06-26

## 2023-06-27 NOTE — TELEPHONE ENCOUNTER
Care Due:                  Date            Visit Type   Department     Provider  --------------------------------------------------------------------------------                                EP -                              PRIMARY      JACK Whalen  Last Visit: 02-      CARE ([facility])   MEDICINE       Naccari                              EP -                              PRIMARY      [facility]    Emigdio Whalen  Next Visit: 11-      CARE ([facility])   MEDICINE       Naccari                                                            Last  Test          Frequency    Reason                     Performed    Due Date  --------------------------------------------------------------------------------    HBA1C.......  6 months...  metFORMIN................  01- 07-    [facility] Embedded Care Due Messages. Reference number: [id number].   6/27/2023 10:44:21 AM CDT

## 2023-07-11 NOTE — ED NOTES
Pt remains unresponsive. ALEAH. SR on monitor.  Remains hypertensive.  Vent settings:  Assist control rate 16, FIO2 100 %, Tidal vol 450, peep 5   Quality 176: Tuberculosis Screening Prior To First Course Of Biologic And/Or Immune Response Modifier Therapy: Patient receiving first-time biologic and/or immune response modifier therapy, TB Screening Performed and Results Interpreted within 12 months Detail Level: Generalized

## 2023-12-20 ENCOUNTER — PATIENT OUTREACH (OUTPATIENT)
Dept: ADMINISTRATIVE | Facility: HOSPITAL | Age: 63
End: 2023-12-20
Payer: MEDICAID

## 2023-12-20 NOTE — PROGRESS NOTES
Population Health Chart Review & Patient Outreach Details    Outreach Performed: YES Telephone Successful    Additional Pop Health Notes:           Updates Requested / Reviewed:      Updated Care Coordination Note, Care Everywhere, and          Health Maintenance Topics Overdue:    Health Maintenance Due   Topic Date Due    Eye Exam  Never done    TETANUS VACCINE  Never done    Colorectal Cancer Screening  Never done    Shingles Vaccine (1 of 2) Never done    RSV Vaccine (Age 60+ and Pregnant patients) (1 - 1-dose 60+ series) Never done    Diabetes Urine Screening  09/03/2021    Hemoglobin A1c  07/11/2023    Influenza Vaccine (1) 09/01/2023    COVID-19 Vaccine (3 - 2023-24 season) 09/01/2023    Lipid Panel  01/11/2024         Health Maintenance Topic(s) Outreach Outcomes & Actions Taken:    Eye Exam - Outreach Outcomes & Actions Taken  : Pt Declined Scheduling Eye Exam

## 2024-01-08 ENCOUNTER — TELEPHONE (OUTPATIENT)
Dept: FAMILY MEDICINE | Facility: CLINIC | Age: 64
End: 2024-01-08
Payer: MEDICAID

## 2024-01-08 DIAGNOSIS — J44.1 COPD EXACERBATION: ICD-10-CM

## 2024-01-08 RX ORDER — ALBUTEROL SULFATE 90 UG/1
2 AEROSOL, METERED RESPIRATORY (INHALATION) EVERY 4 HOURS PRN
Qty: 54 G | Refills: 3 | Status: CANCELLED | OUTPATIENT
Start: 2024-01-08 | End: 2025-01-07

## 2024-01-08 NOTE — TELEPHONE ENCOUNTER
----- Message from Sheryl Eldridge sent at 1/8/2024 11:58 AM CST -----  Contact: self  Type: RX Refill Request        Who Called: Patient   Refill or New Rx: Refill  RX Name and Strength:   albuterol (PROVENTIL HFA) 90 mcg/actuation inhaler  metFORMIN (GLUCOPHAGE-XR) 500 MG ER 24hr tablet  How is the patient currently taking it? (ex. 1XDay): as directed   Is this a 30 day or 90 day RX: n/a  Preferred Pharmacy with phone number:   [facility] - [place], [place] - [address]  [address]  [place] [address]  Phone: [phone] Fax: [phone]  Local or Mail Order: local   Ordering Provider: Dr. Abdiaziz Castanon Call Back Number: [phone] (home)   Additional Information: Plz Refill for the pt. Thanks

## 2024-01-11 DIAGNOSIS — J44.1 COPD EXACERBATION: ICD-10-CM

## 2024-01-11 DIAGNOSIS — E11.9 TYPE 2 DIABETES MELLITUS WITHOUT COMPLICATION, WITHOUT LONG-TERM CURRENT USE OF INSULIN: ICD-10-CM

## 2024-01-11 RX ORDER — ALBUTEROL SULFATE 90 UG/1
2 AEROSOL, METERED RESPIRATORY (INHALATION) EVERY 4 HOURS PRN
Qty: 54 G | Refills: 0 | Status: SHIPPED | OUTPATIENT
Start: 2024-01-11 | End: 2025-01-10

## 2024-01-11 RX ORDER — METFORMIN HYDROCHLORIDE 500 MG/1
500 TABLET, EXTENDED RELEASE ORAL
Qty: 90 TABLET | Refills: 0 | Status: SHIPPED | OUTPATIENT
Start: 2024-01-11 | End: 2025-01-10

## 2024-01-11 NOTE — TELEPHONE ENCOUNTER
Care Due:                  Date            Visit Type   Department     Provider  --------------------------------------------------------------------------------                                EP -                              PRIMARY      [facility] FAM[facility]    Emigdio Whalen  Last Visit: 02-      CARE ([facility])   MEDICINE       Abdiaziz  Next Visit: None Scheduled  None         None Found                                                            Last  Test          Frequency    Reason                     Performed    Due Date  --------------------------------------------------------------------------------    CMP.........  12 months..  lisinopriL, metFORMIN,     01- 01-                             rosuvastatin.............    HBA1C.......  6 months...  metFORMIN................  01- 07-    Lipid Panel.  12 months..  rosuvastatin.............  01- 01-    [facility] Embedded Care Due Messages. Reference number: [id number].   1/11/2024 12:30:44 PM CST  
Received faxed document from [facility] requesting refills of the following:     Metformin hcl ER 500mg tablet   Albuterol hfa 90mcg inhaler  Lisinopril 10mg tablet     LOV: 5/10/23 wPrecious Leung   NOV: N/A    Patient missed last scheduled visit for 11/10/23.     Called patient to discuss rescheduling missed appointment - no answer; left VM requesting phone call back to office, as he is overdue for scheduled follow up.     Upon reviewing the chart it appears the patient should still have refills of lisinopril available until June 2024. Metformin and albuterol are due for refill.   
cough/ear pain

## 2024-01-17 DIAGNOSIS — E11.9 TYPE 2 DIABETES MELLITUS WITHOUT COMPLICATION: ICD-10-CM

## 2024-02-06 DIAGNOSIS — Z12.11 COLON CANCER SCREENING: ICD-10-CM

## 2024-04-10 DIAGNOSIS — E11.9 TYPE 2 DIABETES MELLITUS WITHOUT COMPLICATION, UNSPECIFIED WHETHER LONG TERM INSULIN USE: ICD-10-CM

## 2024-05-07 ENCOUNTER — LAB VISIT (OUTPATIENT)
Dept: LAB | Facility: HOSPITAL | Age: 64
End: 2024-05-07
Attending: STUDENT IN AN ORGANIZED HEALTH CARE EDUCATION/TRAINING PROGRAM
Payer: MEDICARE

## 2024-05-07 ENCOUNTER — OFFICE VISIT (OUTPATIENT)
Dept: FAMILY MEDICINE | Facility: CLINIC | Age: 64
End: 2024-05-07
Payer: MEDICARE

## 2024-05-07 ENCOUNTER — CLINICAL SUPPORT (OUTPATIENT)
Dept: FAMILY MEDICINE | Facility: CLINIC | Age: 64
End: 2024-05-07
Attending: STUDENT IN AN ORGANIZED HEALTH CARE EDUCATION/TRAINING PROGRAM
Payer: MEDICARE

## 2024-05-07 ENCOUNTER — TELEPHONE (OUTPATIENT)
Dept: FAMILY MEDICINE | Facility: CLINIC | Age: 64
End: 2024-05-07

## 2024-05-07 VITALS
WEIGHT: 181.88 LBS | OXYGEN SATURATION: 95 % | BODY MASS INDEX: 29.23 KG/M2 | DIASTOLIC BLOOD PRESSURE: 72 MMHG | SYSTOLIC BLOOD PRESSURE: 128 MMHG | HEART RATE: 71 BPM | HEIGHT: 66 IN

## 2024-05-07 DIAGNOSIS — Z00.00 ROUTINE GENERAL MEDICAL EXAMINATION AT A HEALTH CARE FACILITY: Primary | ICD-10-CM

## 2024-05-07 DIAGNOSIS — E11.9 TYPE 2 DIABETES MELLITUS WITHOUT COMPLICATION, WITHOUT LONG-TERM CURRENT USE OF INSULIN: ICD-10-CM

## 2024-05-07 DIAGNOSIS — Z12.5 ENCOUNTER FOR PROSTATE CANCER SCREENING: ICD-10-CM

## 2024-05-07 DIAGNOSIS — Z00.00 ROUTINE GENERAL MEDICAL EXAMINATION AT A HEALTH CARE FACILITY: ICD-10-CM

## 2024-05-07 DIAGNOSIS — I15.2 HYPERTENSION ASSOCIATED WITH DIABETES: ICD-10-CM

## 2024-05-07 DIAGNOSIS — F17.210 CIGARETTE NICOTINE DEPENDENCE WITHOUT COMPLICATION: ICD-10-CM

## 2024-05-07 DIAGNOSIS — E78.2 MIXED HYPERLIPIDEMIA: ICD-10-CM

## 2024-05-07 DIAGNOSIS — E11.59 HYPERTENSION ASSOCIATED WITH DIABETES: ICD-10-CM

## 2024-05-07 LAB
ALBUMIN SERPL BCP-MCNC: 4.1 G/DL (ref 3.5–5.2)
ALBUMIN/CREAT UR: 501 UG/MG (ref 0–30)
ALP SERPL-CCNC: 63 U/L (ref 55–135)
ALT SERPL W/O P-5'-P-CCNC: 31 U/L (ref 10–44)
ANION GAP SERPL CALC-SCNC: 11 MMOL/L (ref 8–16)
AST SERPL-CCNC: 22 U/L (ref 10–40)
BASOPHILS # BLD AUTO: 0.08 K/UL (ref 0–0.2)
BASOPHILS NFR BLD: 0.8 % (ref 0–1.9)
BILIRUB SERPL-MCNC: 0.3 MG/DL (ref 0.1–1)
BUN SERPL-MCNC: 20 MG/DL (ref 8–23)
CALCIUM SERPL-MCNC: 10.7 MG/DL (ref 8.7–10.5)
CHLORIDE SERPL-SCNC: 103 MMOL/L (ref 95–110)
CHOLEST SERPL-MCNC: 163 MG/DL (ref 120–199)
CHOLEST/HDLC SERPL: 5.3 {RATIO} (ref 2–5)
CO2 SERPL-SCNC: 25 MMOL/L (ref 23–29)
COMPLEXED PSA SERPL-MCNC: 0.15 NG/ML (ref 0–4)
CREAT SERPL-MCNC: 0.9 MG/DL (ref 0.5–1.4)
CREAT UR-MCNC: 204 MG/DL (ref 23–375)
DIFFERENTIAL METHOD BLD: NORMAL
EOSINOPHIL # BLD AUTO: 0.4 K/UL (ref 0–0.5)
EOSINOPHIL NFR BLD: 3.6 % (ref 0–8)
ERYTHROCYTE [DISTWIDTH] IN BLOOD BY AUTOMATED COUNT: 13.3 % (ref 11.5–14.5)
EST. GFR  (NO RACE VARIABLE): >60 ML/MIN/1.73 M^2
ESTIMATED AVG GLUCOSE: 160 MG/DL (ref 68–131)
GLUCOSE SERPL-MCNC: 185 MG/DL (ref 70–110)
HBA1C MFR BLD: 7.2 % (ref 4–5.6)
HCT VFR BLD AUTO: 47.6 % (ref 40–54)
HDLC SERPL-MCNC: 31 MG/DL (ref 40–75)
HDLC SERPL: 19 % (ref 20–50)
HGB BLD-MCNC: 16 G/DL (ref 14–18)
IMM GRANULOCYTES # BLD AUTO: 0.02 K/UL (ref 0–0.04)
IMM GRANULOCYTES NFR BLD AUTO: 0.2 % (ref 0–0.5)
LDLC SERPL CALC-MCNC: ABNORMAL MG/DL (ref 63–159)
LYMPHOCYTES # BLD AUTO: 3.1 K/UL (ref 1–4.8)
LYMPHOCYTES NFR BLD: 30.1 % (ref 18–48)
MCH RBC QN AUTO: 29.1 PG (ref 27–31)
MCHC RBC AUTO-ENTMCNC: 33.6 G/DL (ref 32–36)
MCV RBC AUTO: 87 FL (ref 82–98)
MICROALBUMIN UR DL<=1MG/L-MCNC: 1022 UG/ML
MONOCYTES # BLD AUTO: 0.7 K/UL (ref 0.3–1)
MONOCYTES NFR BLD: 7.1 % (ref 4–15)
NEUTROPHILS # BLD AUTO: 5.9 K/UL (ref 1.8–7.7)
NEUTROPHILS NFR BLD: 58.2 % (ref 38–73)
NONHDLC SERPL-MCNC: 132 MG/DL
NRBC BLD-RTO: 0 /100 WBC
PLATELET # BLD AUTO: 313 K/UL (ref 150–450)
PMV BLD AUTO: 9.6 FL (ref 9.2–12.9)
POTASSIUM SERPL-SCNC: 4.3 MMOL/L (ref 3.5–5.1)
PROT SERPL-MCNC: 7.6 G/DL (ref 6–8.4)
RBC # BLD AUTO: 5.5 M/UL (ref 4.6–6.2)
SODIUM SERPL-SCNC: 139 MMOL/L (ref 136–145)
TRIGL SERPL-MCNC: 419 MG/DL (ref 30–150)
WBC # BLD AUTO: 10.14 K/UL (ref 3.9–12.7)

## 2024-05-07 PROCEDURE — 36415 COLL VENOUS BLD VENIPUNCTURE: CPT | Mod: PO | Performed by: STUDENT IN AN ORGANIZED HEALTH CARE EDUCATION/TRAINING PROGRAM

## 2024-05-07 PROCEDURE — 82043 UR ALBUMIN QUANTITATIVE: CPT | Performed by: STUDENT IN AN ORGANIZED HEALTH CARE EDUCATION/TRAINING PROGRAM

## 2024-05-07 PROCEDURE — 92228 IMG RTA DETC/MNTR DS PHY/QHP: CPT | Mod: 26,S$PBB,, | Performed by: OPTOMETRIST

## 2024-05-07 PROCEDURE — 84153 ASSAY OF PSA TOTAL: CPT | Performed by: STUDENT IN AN ORGANIZED HEALTH CARE EDUCATION/TRAINING PROGRAM

## 2024-05-07 PROCEDURE — 99214 OFFICE O/P EST MOD 30 MIN: CPT | Mod: PBBFAC,PO,25 | Performed by: STUDENT IN AN ORGANIZED HEALTH CARE EDUCATION/TRAINING PROGRAM

## 2024-05-07 PROCEDURE — 83036 HEMOGLOBIN GLYCOSYLATED A1C: CPT | Performed by: STUDENT IN AN ORGANIZED HEALTH CARE EDUCATION/TRAINING PROGRAM

## 2024-05-07 PROCEDURE — 80053 COMPREHEN METABOLIC PANEL: CPT | Performed by: STUDENT IN AN ORGANIZED HEALTH CARE EDUCATION/TRAINING PROGRAM

## 2024-05-07 PROCEDURE — 99214 OFFICE O/P EST MOD 30 MIN: CPT | Mod: S$PBB,25,ICN, | Performed by: STUDENT IN AN ORGANIZED HEALTH CARE EDUCATION/TRAINING PROGRAM

## 2024-05-07 PROCEDURE — 99406 BEHAV CHNG SMOKING 3-10 MIN: CPT | Mod: S$PBB,ICN,, | Performed by: STUDENT IN AN ORGANIZED HEALTH CARE EDUCATION/TRAINING PROGRAM

## 2024-05-07 PROCEDURE — 80061 LIPID PANEL: CPT | Performed by: STUDENT IN AN ORGANIZED HEALTH CARE EDUCATION/TRAINING PROGRAM

## 2024-05-07 PROCEDURE — 85025 COMPLETE CBC W/AUTO DIFF WBC: CPT | Performed by: STUDENT IN AN ORGANIZED HEALTH CARE EDUCATION/TRAINING PROGRAM

## 2024-05-07 PROCEDURE — 92228 IMG RTA DETC/MNTR DS PHY/QHP: CPT | Mod: PBBFAC,PO

## 2024-05-07 PROCEDURE — 99999 PR PBB SHADOW E&M-EST. PATIENT-LVL IV: CPT | Mod: PBBFAC,,, | Performed by: STUDENT IN AN ORGANIZED HEALTH CARE EDUCATION/TRAINING PROGRAM

## 2024-05-07 RX ORDER — ROSUVASTATIN CALCIUM 10 MG/1
10 TABLET, COATED ORAL DAILY
Qty: 90 TABLET | Refills: 4 | Status: SHIPPED | OUTPATIENT
Start: 2024-05-07 | End: 2025-05-07

## 2024-05-07 RX ORDER — METFORMIN HYDROCHLORIDE 500 MG/1
500 TABLET, EXTENDED RELEASE ORAL
Qty: 90 TABLET | Refills: 4 | Status: SHIPPED | OUTPATIENT
Start: 2024-05-07 | End: 2025-05-07

## 2024-05-07 RX ORDER — LISINOPRIL 10 MG/1
10 TABLET ORAL DAILY
Qty: 90 TABLET | Refills: 4 | Status: SHIPPED | OUTPATIENT
Start: 2024-05-07

## 2024-05-07 NOTE — PATIENT INSTRUCTIONS
Uri Bowden,     If you are due for any health screening(s) below please notify me so we can arrange them to be ordered and scheduled to maintain your health. Most healthy patients complete it. Don't lose out on improving your health.     Tests to Keep You Healthy    Eye Exam: ORDERED BUT NOT SCHEDULED  Colon Cancer Screening: ORDERED  Last Blood Pressure <= 139/89 (5/7/2024): Yes  Last HbA1c < 8 (01/11/2023): NO         Colon Cancer Screening    Of cancers affecting both men and women, colorectal cancer is the third leading cancer killer in the [place]. But it doesnt have to be. Screening can prevent colorectal cancer or find it at an early stage when treatment often leads to a cure.    A colonoscopy is the preferred test for detecting colon cancer. It is needed only once every 10 years if results are negative. While sedated, a flexible, lighted tube with a tiny camera is inserted into the rectum and advanced through the colon to look for cancers. An alternative screening test that is used at home and returned to the lab may also be used. It detects hidden blood in bowel movements which could indicate cancer in the colon. If results are positive, you will need a colonoscopy to determine if the blood is a sign of cancer. This type of follow up (diagnostic) colonoscopy usually requires additional copays as required by your insurance provider. Please contact your PCP if you have any questions.         Diabetic Retinal Eye Exam    Diabetes is the #1 cause of blindness in the [place] - early detection before signs or symptoms develop can prevent debilitating blindness.    Once-a-year screening is recommended for all diabetic patients. This exam can prevent and treat diabetes complications in the eye before developing symptoms. This can be done with a special camera is used to take photographs of the back of your eye without having to dilate them, or you can see an eye doctor for a full dilated exam.    Diabetic A1c  Testing    Your chart identifies you as having diabetes. It is recommended that all diabetes patients have an A1c test done at least once a year, but [facility] recommends twice a year for most patients. This helps your doctor to better help you manage your diabetes. This is a non-fasting lab test which can be completed at any time. Your result will be sent to your Primary Care Provider for review and that office will contact you with the results.

## 2024-05-07 NOTE — PROGRESS NOTES
"[facility] MEDICINE CLINIC NOTE    Patient Name: Kal Dodd  YOB: 1960    PRESENTING HISTORY     History of Present Illness:  Mr. Kal Dodd is a 64 y.o. male here for routine f/u.     T2DM- needs recheck. Doesn' tcheck BP. Compliant with metformin.     On statin, aspirin.     On lisinopril.     Tobacco use- not ready to quit. We discussed health benefits for 3 minutes today. Switched to CBD from smoking marijuana as much. I think aslong as the source is reputable that is probably an improvement.       ROS      OBJECTIVE:   Vital Signs:  Vitals:    05/07/24 1044   BP: 128/72   Pulse: 71   SpO2: 95%   Weight: 82.5 kg (181 lb 14.1 oz)   Height: 5' 6" (1.676 m)          Physical Exam: Normal, no change.     Physical Exam    ASSESSMENT & PLAN:     Routine general medical examination at a health care facility  -     Lipid Panel; Future; Expected date: 05/07/2024  -     Hemoglobin A1C; Future; Expected date: 05/07/2024  -     Comprehensive Metabolic Panel; Future; Expected date: 05/07/2024  -     CBC Auto Differential; Future; Expected date: 05/07/2024  -     Microalbumin/Creatinine Ratio, Urine; Future; Expected date: 05/07/2024  -     PSA, SCREENING; Future; Expected date: 05/07/2024    Hypertension associated with diabetes  -     lisinopriL 10 MG tablet; Take 1 tablet (10 mg total) by mouth once daily.  Dispense: 90 tablet; Refill: 4    Mixed hyperlipidemia  -     rosuvastatin (CRESTOR) 10 MG tablet; Take 1 tablet (10 mg total) by mouth once daily.  Dispense: 90 tablet; Refill: 4  -     Lipid Panel; Future; Expected date: 05/07/2024  -     Microalbumin/Creatinine Ratio, Urine; Future; Expected date: 05/07/2024    Type 2 diabetes mellitus without complication, without long-term current use of insulin  -     metFORMIN (GLUCOPHAGE-XR) 500 MG ER 24hr tablet; Take 1 tablet (500 mg total) by mouth daily with breakfast.  Dispense: 90 tablet; Refill: 4  -     Hemoglobin A1C; Future; Expected " date: 05/07/2024  -     Comprehensive Metabolic Panel; Future; Expected date: 05/07/2024  -     Microalbumin/Creatinine Ratio, Urine; Future; Expected date: 05/07/2024  -     Diabetic Eye Screening Photo; Future  If A1c still high, probably add Jardiance.     Encounter for prostate cancer screening  -     PSA, SCREENING; Future; Expected date: 05/07/2024    Cigarette nicotine dependence without complication  Pre-contemplative, try to move towards contemplation.        Will try to keep plan easy to execute, encourage f/uNeha Freed  Internal Medicine    Visit complexity inherent to evaluation and management associated with medical care services that serve as the continuing focal point for all needed health care services and/or with medical care services that are part of ongoing care related to a patient's single, serious condition or a complex condition provided today

## 2024-05-07 NOTE — PROGRESS NOTES
Kal Dodd is a 64 y.o. male here for a diabetic eye screening with non-dilated fundus photos per Dr. Freed.    Patient cooperative?: Yes  Small pupils?: No  Last eye exam: Not Known    For exam results, see Encounter Report.

## 2024-05-08 DIAGNOSIS — R80.9 TYPE 2 DIABETES MELLITUS WITH DIABETIC MICROALBUMINURIA, WITHOUT LONG-TERM CURRENT USE OF INSULIN: Primary | ICD-10-CM

## 2024-05-08 DIAGNOSIS — E11.29 TYPE 2 DIABETES MELLITUS WITH DIABETIC MICROALBUMINURIA, WITHOUT LONG-TERM CURRENT USE OF INSULIN: Primary | ICD-10-CM

## 2024-06-06 DIAGNOSIS — J44.1 COPD EXACERBATION: ICD-10-CM

## 2024-06-06 DIAGNOSIS — J44.9 CHRONIC OBSTRUCTIVE PULMONARY DISEASE, UNSPECIFIED COPD TYPE: ICD-10-CM

## 2024-06-06 DIAGNOSIS — I25.10 CORONARY ARTERY DISEASE INVOLVING NATIVE CORONARY ARTERY OF NATIVE HEART WITHOUT ANGINA PECTORIS: ICD-10-CM

## 2024-06-06 DIAGNOSIS — I50.22 CHRONIC SYSTOLIC CONGESTIVE HEART FAILURE: ICD-10-CM

## 2024-06-06 RX ORDER — FLUTICASONE PROPIONATE AND SALMETEROL 100; 50 UG/1; UG/1
1 POWDER RESPIRATORY (INHALATION) 2 TIMES DAILY
Qty: 180 EACH | Refills: 3 | Status: SHIPPED | OUTPATIENT
Start: 2024-06-06

## 2024-06-06 RX ORDER — NITROGLYCERIN 0.4 MG/1
0.4 TABLET SUBLINGUAL EVERY 5 MIN PRN
Qty: 25 TABLET | Refills: 2 | Status: SHIPPED | OUTPATIENT
Start: 2024-06-06 | End: 2025-06-06

## 2024-06-06 RX ORDER — ALBUTEROL SULFATE 90 UG/1
2 AEROSOL, METERED RESPIRATORY (INHALATION) EVERY 4 HOURS PRN
Qty: 54 G | Refills: 2 | Status: SHIPPED | OUTPATIENT
Start: 2024-06-06 | End: 2025-06-06

## 2024-06-06 NOTE — TELEPHONE ENCOUNTER
No care due was identified.  [facility] Embedded Care Due Messages. Reference number: [id number].   6/06/2024 11:03:17 AM CDT

## 2024-06-06 NOTE — TELEPHONE ENCOUNTER
Sue Andrade, Patient Care Assistant  [facility] Staff     ----- Message from Sue Andrade Patient Care Assistant sent at 6/6/2024 10:55 AM CDT -----  Contact: Pt  Type:  RX Refill Request    Who Called:  Pt  Refill or New Rx:  Refill  RX Name and Strength:  ADVAIR DISKUS 100-50 mcg/dose diskus inhaler,albuterol (PROVENTIL HFA) 90 mcg/actuation inhaler,nitroGLYCERIN (NITROSTAT) 0.4 MG SL tablet  How is the patient currently taking it? (ex. 1XDay):  As Directed  Is this a 30 day or 90 day RX:  90  Preferred Pharmacy with phone number:    [facility] [address]  [address] [address]  Phone: [phone] Fax: [phone]  Local or Mail Order:  local  Ordering Provider:  Abdiaziz Castanon Call Back Number:  [phone]  Additional Information:  Please contact pt upon completion-Thank you~

## 2024-11-08 ENCOUNTER — LAB VISIT (OUTPATIENT)
Dept: LAB | Facility: HOSPITAL | Age: 64
End: 2024-11-08
Attending: STUDENT IN AN ORGANIZED HEALTH CARE EDUCATION/TRAINING PROGRAM
Payer: MEDICARE

## 2024-11-08 ENCOUNTER — TELEPHONE (OUTPATIENT)
Dept: FAMILY MEDICINE | Facility: CLINIC | Age: 64
End: 2024-11-08
Payer: MEDICARE

## 2024-11-08 DIAGNOSIS — I15.2 HYPERTENSION ASSOCIATED WITH DIABETES: ICD-10-CM

## 2024-11-08 DIAGNOSIS — E11.59 HYPERTENSION ASSOCIATED WITH DIABETES: ICD-10-CM

## 2024-11-08 DIAGNOSIS — E11.29 TYPE 2 DIABETES MELLITUS WITH DIABETIC MICROALBUMINURIA, WITHOUT LONG-TERM CURRENT USE OF INSULIN: ICD-10-CM

## 2024-11-08 DIAGNOSIS — E78.2 MIXED HYPERLIPIDEMIA: ICD-10-CM

## 2024-11-08 DIAGNOSIS — R80.9 TYPE 2 DIABETES MELLITUS WITH DIABETIC MICROALBUMINURIA, WITHOUT LONG-TERM CURRENT USE OF INSULIN: ICD-10-CM

## 2024-11-08 DIAGNOSIS — E11.9 TYPE 2 DIABETES MELLITUS WITHOUT COMPLICATION, WITHOUT LONG-TERM CURRENT USE OF INSULIN: ICD-10-CM

## 2024-11-08 LAB
ALBUMIN SERPL BCP-MCNC: 4 G/DL (ref 3.5–5.2)
ALBUMIN/CREAT UR: 1223.5 UG/MG (ref 0–30)
ALP SERPL-CCNC: 62 U/L (ref 40–150)
ALT SERPL W/O P-5'-P-CCNC: 32 U/L (ref 10–44)
ANION GAP SERPL CALC-SCNC: 14 MMOL/L (ref 8–16)
AST SERPL-CCNC: 18 U/L (ref 10–40)
BILIRUB SERPL-MCNC: 0.3 MG/DL (ref 0.1–1)
BUN SERPL-MCNC: 15 MG/DL (ref 8–23)
CALCIUM SERPL-MCNC: 10 MG/DL (ref 8.7–10.5)
CHLORIDE SERPL-SCNC: 104 MMOL/L (ref 95–110)
CHOLEST SERPL-MCNC: 188 MG/DL (ref 120–199)
CHOLEST/HDLC SERPL: 6.3 {RATIO} (ref 2–5)
CO2 SERPL-SCNC: 19 MMOL/L (ref 23–29)
CREAT SERPL-MCNC: 0.9 MG/DL (ref 0.5–1.4)
CREAT UR-MCNC: 98 MG/DL (ref 23–375)
EST. GFR  (NO RACE VARIABLE): >60 ML/MIN/1.73 M^2
ESTIMATED AVG GLUCOSE: 157 MG/DL (ref 68–131)
GLUCOSE SERPL-MCNC: 167 MG/DL (ref 70–110)
HBA1C MFR BLD: 7.1 % (ref 4–5.6)
HDLC SERPL-MCNC: 30 MG/DL (ref 40–75)
HDLC SERPL: 16 % (ref 20–50)
LDLC SERPL CALC-MCNC: ABNORMAL MG/DL (ref 63–159)
MICROALBUMIN UR DL<=1MG/L-MCNC: 1199 UG/ML
NONHDLC SERPL-MCNC: 158 MG/DL
POTASSIUM SERPL-SCNC: 4.6 MMOL/L (ref 3.5–5.1)
PROT SERPL-MCNC: 7.6 G/DL (ref 6–8.4)
SODIUM SERPL-SCNC: 137 MMOL/L (ref 136–145)
TRIGL SERPL-MCNC: 919 MG/DL (ref 30–150)

## 2024-11-08 PROCEDURE — 80053 COMPREHEN METABOLIC PANEL: CPT | Performed by: STUDENT IN AN ORGANIZED HEALTH CARE EDUCATION/TRAINING PROGRAM

## 2024-11-08 PROCEDURE — 82570 ASSAY OF URINE CREATININE: CPT | Performed by: STUDENT IN AN ORGANIZED HEALTH CARE EDUCATION/TRAINING PROGRAM

## 2024-11-08 PROCEDURE — 80061 LIPID PANEL: CPT | Performed by: STUDENT IN AN ORGANIZED HEALTH CARE EDUCATION/TRAINING PROGRAM

## 2024-11-08 PROCEDURE — 83036 HEMOGLOBIN GLYCOSYLATED A1C: CPT | Performed by: STUDENT IN AN ORGANIZED HEALTH CARE EDUCATION/TRAINING PROGRAM

## 2024-11-08 RX ORDER — ROSUVASTATIN CALCIUM 10 MG/1
10 TABLET, COATED ORAL DAILY
Qty: 90 TABLET | Refills: 4 | Status: SHIPPED | OUTPATIENT
Start: 2024-11-08 | End: 2025-11-08

## 2024-11-08 RX ORDER — METFORMIN HYDROCHLORIDE 500 MG/1
500 TABLET, EXTENDED RELEASE ORAL
Qty: 90 TABLET | Refills: 4 | Status: SHIPPED | OUTPATIENT
Start: 2024-11-08 | End: 2025-11-08

## 2024-11-08 RX ORDER — LISINOPRIL 10 MG/1
10 TABLET ORAL DAILY
Qty: 90 TABLET | Refills: 4 | Status: SHIPPED | OUTPATIENT
Start: 2024-11-08

## 2024-11-08 NOTE — TELEPHONE ENCOUNTER
----- Message from Bianca sent at 11/8/2024  8:13 AM CST -----  Type: Needs Medical Advice  Who Called:  pt     Pharmacy name and phone #:    [facility] - [place], [place] - [address]  [address]  [place] [address]  Phone: [phone] Fax: [phone]      Best Call Back Number: [phone] (home)       Additional Information: pt pt is needing the following medications refilled : rosuvastatin (CRESTOR) 10 MG tablet , lisinopriL 10 MG tablet and metFORMIN (GLUCOPHAGE-XR) 500 MG ER 24hr tablet  please advise

## 2024-11-08 NOTE — TELEPHONE ENCOUNTER
Call placed to patient in regards to which medications Mr. Dodd needs refills on. Mr. Dodd stated he will call the office back once he is done with his  Appointment.

## 2024-11-11 ENCOUNTER — TELEPHONE (OUTPATIENT)
Dept: FAMILY MEDICINE | Facility: CLINIC | Age: 64
End: 2024-11-11
Payer: MEDICARE

## 2024-11-11 DIAGNOSIS — R80.9 TYPE 2 DIABETES MELLITUS WITH DIABETIC MICROALBUMINURIA, WITHOUT LONG-TERM CURRENT USE OF INSULIN: ICD-10-CM

## 2024-11-11 DIAGNOSIS — E78.5 HYPERLIPIDEMIA ASSOCIATED WITH TYPE 2 DIABETES MELLITUS: Primary | ICD-10-CM

## 2024-11-11 DIAGNOSIS — E11.69 HYPERLIPIDEMIA ASSOCIATED WITH TYPE 2 DIABETES MELLITUS: Primary | ICD-10-CM

## 2024-11-11 DIAGNOSIS — E78.1 HYPERTRIGLYCERIDEMIA: ICD-10-CM

## 2024-11-11 DIAGNOSIS — E11.29 TYPE 2 DIABETES MELLITUS WITH DIABETIC MICROALBUMINURIA, WITHOUT LONG-TERM CURRENT USE OF INSULIN: ICD-10-CM

## 2024-11-11 RX ORDER — FENOFIBRATE 160 MG/1
160 TABLET ORAL DAILY
Qty: 90 TABLET | Refills: 3 | Status: SHIPPED | OUTPATIENT
Start: 2024-11-11 | End: 2025-11-11

## 2024-11-11 NOTE — TELEPHONE ENCOUNTER
Received message below. Attempted to call pt via phone. Unable to reach. Left voicemail for patient to call the office back.      Assigned User Name: Lesvia Ramirez  Contact 1:    11/11/24, 11:26 AM - Outcome =  Patient Request  1st Attempt 11/11/24 spoke with pt and he stated that he wanted to talk with Dr Gil office first befor he schedule his Nephrology appt pc

## 2025-01-28 ENCOUNTER — OFFICE VISIT (OUTPATIENT)
Dept: OPHTHALMOLOGY | Facility: CLINIC | Age: 65
End: 2025-01-28
Payer: MEDICARE

## 2025-01-28 DIAGNOSIS — E11.9 DIABETES MELLITUS TYPE 2 WITHOUT RETINOPATHY: ICD-10-CM

## 2025-01-28 DIAGNOSIS — H35.051 CHOROIDAL NEOVASCULARIZATION, RIGHT EYE: Primary | ICD-10-CM

## 2025-01-28 PROCEDURE — 1126F AMNT PAIN NOTED NONE PRSNT: CPT | Mod: CPTII,S$GLB,, | Performed by: OPHTHALMOLOGY

## 2025-01-28 PROCEDURE — 99204 OFFICE O/P NEW MOD 45 MIN: CPT | Mod: S$GLB,,, | Performed by: OPHTHALMOLOGY

## 2025-01-28 PROCEDURE — 99999 PR PBB SHADOW E&M-EST. PATIENT-LVL II: CPT | Mod: PBBFAC,,, | Performed by: OPHTHALMOLOGY

## 2025-01-28 PROCEDURE — 1159F MED LIST DOCD IN RCRD: CPT | Mod: CPTII,S$GLB,, | Performed by: OPHTHALMOLOGY

## 2025-01-28 PROCEDURE — 2023F DILAT RTA XM W/O RTNOPTHY: CPT | Mod: CPTII,S$GLB,, | Performed by: OPHTHALMOLOGY

## 2025-01-28 PROCEDURE — 3288F FALL RISK ASSESSMENT DOCD: CPT | Mod: CPTII,S$GLB,, | Performed by: OPHTHALMOLOGY

## 2025-01-28 PROCEDURE — 4010F ACE/ARB THERAPY RXD/TAKEN: CPT | Mod: CPTII,S$GLB,, | Performed by: OPHTHALMOLOGY

## 2025-01-28 PROCEDURE — 1101F PT FALLS ASSESS-DOCD LE1/YR: CPT | Mod: CPTII,S$GLB,, | Performed by: OPHTHALMOLOGY

## 2025-01-28 PROCEDURE — 92134 CPTRZ OPH DX IMG PST SGM RTA: CPT | Mod: S$GLB,,, | Performed by: OPHTHALMOLOGY

## 2025-01-28 NOTE — PROGRESS NOTES
HPI    New patient complaining of blurry spot in center of OD    Pt states for about 2wks or more has noticed a blurry spot in center of   OD. Denies pain/FOL/floaters\    Hemoglobin A1C       Date                     Value               Ref Range             Status                11/08/2024               7.1 (H)             4.0 - 5.6 %           Final                       05/07/2024               7.2 (H)             4.0 - 5.6 %           Final                     01/11/2023               7.2 (H)             4.0 - 5.6 %           Final               Last edited by Joan Seth on 1/28/2025  9:40 AM.            Assessment /Plan     For exam results, see Encounter Report.    Choroidal neovascularization, right eye    Diabetes mellitus type 2 without retinopathy      1. Choroidal neovascularization, right eye (Primary)  CNV OD with SRF  Possible wet AMD  ~2 week hx    Reviewed dx with patient, need for treatment, likely eye injections    Refer to retina for eval treat    2. Diabetes mellitus type 2 without retinopathy  Diabetes without retinopathy, discussed with patient importance of glucose control and follow up.  Patient voices understanding.

## 2025-02-12 ENCOUNTER — OFFICE VISIT (OUTPATIENT)
Dept: OPHTHALMOLOGY | Facility: CLINIC | Age: 65
End: 2025-02-12
Payer: MEDICARE

## 2025-02-12 DIAGNOSIS — H25.13 AGE-RELATED NUCLEAR CATARACT OF BOTH EYES: ICD-10-CM

## 2025-02-12 DIAGNOSIS — H35.3211 EXUDATIVE AGE-RELATED MACULAR DEGENERATION OF RIGHT EYE WITH ACTIVE CHOROIDAL NEOVASCULARIZATION: Primary | ICD-10-CM

## 2025-02-12 DIAGNOSIS — E11.9 DIABETES MELLITUS TYPE 2 WITHOUT RETINOPATHY: ICD-10-CM

## 2025-02-12 DIAGNOSIS — H35.3121 EARLY DRY STAGE NONEXUDATIVE AGE-RELATED MACULAR DEGENERATION OF LEFT EYE: ICD-10-CM

## 2025-02-12 PROCEDURE — 1160F RVW MEDS BY RX/DR IN RCRD: CPT | Mod: CPTII,S$GLB,, | Performed by: OPHTHALMOLOGY

## 2025-02-12 PROCEDURE — 1159F MED LIST DOCD IN RCRD: CPT | Mod: CPTII,S$GLB,, | Performed by: OPHTHALMOLOGY

## 2025-02-12 PROCEDURE — 99999 PR PBB SHADOW E&M-EST. PATIENT-LVL III: CPT | Mod: PBBFAC,,, | Performed by: OPHTHALMOLOGY

## 2025-02-12 PROCEDURE — 92134 CPTRZ OPH DX IMG PST SGM RTA: CPT | Mod: S$GLB,,, | Performed by: OPHTHALMOLOGY

## 2025-02-12 PROCEDURE — 67028 INJECTION EYE DRUG: CPT | Mod: RT,S$GLB,, | Performed by: OPHTHALMOLOGY

## 2025-02-12 PROCEDURE — 4010F ACE/ARB THERAPY RXD/TAKEN: CPT | Mod: CPTII,S$GLB,, | Performed by: OPHTHALMOLOGY

## 2025-02-12 PROCEDURE — 3288F FALL RISK ASSESSMENT DOCD: CPT | Mod: CPTII,S$GLB,, | Performed by: OPHTHALMOLOGY

## 2025-02-12 PROCEDURE — 2023F DILAT RTA XM W/O RTNOPTHY: CPT | Mod: CPTII,S$GLB,, | Performed by: OPHTHALMOLOGY

## 2025-02-12 PROCEDURE — 1126F AMNT PAIN NOTED NONE PRSNT: CPT | Mod: CPTII,S$GLB,, | Performed by: OPHTHALMOLOGY

## 2025-02-12 PROCEDURE — 1101F PT FALLS ASSESS-DOCD LE1/YR: CPT | Mod: CPTII,S$GLB,, | Performed by: OPHTHALMOLOGY

## 2025-02-12 PROCEDURE — 99214 OFFICE O/P EST MOD 30 MIN: CPT | Mod: 25,S$GLB,, | Performed by: OPHTHALMOLOGY

## 2025-02-12 RX ADMIN — Medication 1.25 MG: at 10:02

## 2025-02-12 NOTE — PROGRESS NOTES
HPI    DLS: 1/28/2025 pt present for retina eval per Dr. Lam    Pt feels vision in OD has gotten a little worse. Denies pain/FOL/floaters\    No gtts  Last edited by Joan Seth on 2/12/2025  9:10 AM.         A/P    ICD-10-CM ICD-9-CM   1. Exudative age-related macular degeneration of right eye with active choroidal neovascularization  H35.3211 362.52     362.16   2. Early dry stage nonexudative age-related macular degeneration of left eye  H35.3121 362.51   3. Age-related nuclear cataract of both eyes  H25.13 366.16   4. Diabetes mellitus type 2 without retinopathy  E11.9 250.00       1. Exudative age-related macular degeneration of right eye with active choroidal neovascularization  Referral from Dr Lam for retina eval - Recent notes reviewed  Pt not sure when vision declined    Exam notable for active CNVM with IRF/SRF and heme  Plan: given active CNVM, recommend urgent Avastin OD  Based on todays exam, diagnostic studies, and review of records, the determination was made for treatment today.  Schedule Avastin Injection today Right Eye Patient chooses to proceed with injection R/B/A discussed include infection retinal detachment and stroke    Patient identified.  Timeout performed.    Risks, benefits, and alternatives to treatment were discussed in detail with the patient, including bleeding/infection (endophthalmitis)/etc.  The patient voiced understanding and wished to proceed with the procedure.  See separate consent form.    Injection Procedure Note:  Diagnosis: CNVM Right Eye    Topical Proparacaine drop placed then topical 5% Betadine and then subconjunctival lidocaine 2% injection  Sterile gloves used, and sterile lid speculum placed.  5% Betadine placed at injection site again prior to injection.  Avastin 1.25mg in 0.05cc Injected inferotemporally 3.5-4mm posterior to the limbus.  Complications: None  Va at least CF at 5 feet post injection.  Retina, ONH, IOP normal after injection.    Followup  as below.  Patient should return immediately PRN.  Retinal Detachment and Endophthalmitis precautions given.     2. Early dry stage nonexudative age-related macular degeneration of left eye  Mild dry changes, no heme  Plan: Observation for now, counseled on dry AMD findings and risk conversion to wet AMD  Amsler precautions  Recommend Antioxidants, lutein, omega 3, brown sunglasses (blue blockers).     3. Age-related nuclear cataract of both eyes  Mild NS, NVS  Plan: Observation Update Mrx prn - monocular precautions    4. Diabetes mellitus type 2 without retinopathy  Pcp Emigdio Freed MD - Recent notes reviewed  11/08/2024  7.1  A1C   No DR or DME OU  Plan: Observation  Recommend good blood pressure control, tight blood glucose control, and good cholesterol control     RTC 1 mo DFE/OCTm OU, poss Avastin OD        I saw and examined the patient and reviewed in detail the findings documented. The final examination findings, image interpretations which have been independently interpreted, and plan as documented in the record represent my personal judgment and conclusions.    Jack Wang MD  Vitreoretinal Surgery   [facility]

## 2025-03-19 ENCOUNTER — OFFICE VISIT (OUTPATIENT)
Dept: OPHTHALMOLOGY | Facility: CLINIC | Age: 65
End: 2025-03-19
Payer: MEDICARE

## 2025-03-19 DIAGNOSIS — H35.3211 EXUDATIVE AGE-RELATED MACULAR DEGENERATION OF RIGHT EYE WITH ACTIVE CHOROIDAL NEOVASCULARIZATION: Primary | ICD-10-CM

## 2025-03-19 DIAGNOSIS — E11.9 DIABETES MELLITUS TYPE 2 WITHOUT RETINOPATHY: ICD-10-CM

## 2025-03-19 DIAGNOSIS — H35.3121 EARLY DRY STAGE NONEXUDATIVE AGE-RELATED MACULAR DEGENERATION OF LEFT EYE: ICD-10-CM

## 2025-03-19 DIAGNOSIS — H25.13 AGE-RELATED NUCLEAR CATARACT OF BOTH EYES: ICD-10-CM

## 2025-03-19 PROCEDURE — 99499 UNLISTED E&M SERVICE: CPT | Mod: S$GLB,,, | Performed by: OPHTHALMOLOGY

## 2025-03-26 ENCOUNTER — OFFICE VISIT (OUTPATIENT)
Dept: OPHTHALMOLOGY | Facility: CLINIC | Age: 65
End: 2025-03-26
Payer: MEDICARE

## 2025-03-26 DIAGNOSIS — H35.051 CHOROIDAL NEOVASCULARIZATION, RIGHT EYE: ICD-10-CM

## 2025-03-26 DIAGNOSIS — E11.9 DIABETES MELLITUS TYPE 2 WITHOUT RETINOPATHY: ICD-10-CM

## 2025-03-26 DIAGNOSIS — H25.13 AGE-RELATED NUCLEAR CATARACT OF BOTH EYES: ICD-10-CM

## 2025-03-26 DIAGNOSIS — H35.3211 EXUDATIVE AGE-RELATED MACULAR DEGENERATION OF RIGHT EYE WITH ACTIVE CHOROIDAL NEOVASCULARIZATION: Primary | ICD-10-CM

## 2025-03-26 DIAGNOSIS — H35.3121 EARLY DRY STAGE NONEXUDATIVE AGE-RELATED MACULAR DEGENERATION OF LEFT EYE: ICD-10-CM

## 2025-03-26 PROCEDURE — 1160F RVW MEDS BY RX/DR IN RCRD: CPT | Mod: CPTII,S$GLB,, | Performed by: OPHTHALMOLOGY

## 2025-03-26 PROCEDURE — 99213 OFFICE O/P EST LOW 20 MIN: CPT | Mod: S$GLB,,, | Performed by: OPHTHALMOLOGY

## 2025-03-26 PROCEDURE — 1126F AMNT PAIN NOTED NONE PRSNT: CPT | Mod: CPTII,S$GLB,, | Performed by: OPHTHALMOLOGY

## 2025-03-26 PROCEDURE — 1159F MED LIST DOCD IN RCRD: CPT | Mod: CPTII,S$GLB,, | Performed by: OPHTHALMOLOGY

## 2025-03-26 PROCEDURE — 99999 PR PBB SHADOW E&M-EST. PATIENT-LVL III: CPT | Mod: PBBFAC,,, | Performed by: OPHTHALMOLOGY

## 2025-03-26 PROCEDURE — 4010F ACE/ARB THERAPY RXD/TAKEN: CPT | Mod: CPTII,S$GLB,, | Performed by: OPHTHALMOLOGY

## 2025-03-26 PROCEDURE — G2211 COMPLEX E/M VISIT ADD ON: HCPCS | Mod: S$GLB,,, | Performed by: OPHTHALMOLOGY

## 2025-03-26 PROCEDURE — 2023F DILAT RTA XM W/O RTNOPTHY: CPT | Mod: CPTII,S$GLB,, | Performed by: OPHTHALMOLOGY

## 2025-03-26 PROCEDURE — 3288F FALL RISK ASSESSMENT DOCD: CPT | Mod: CPTII,S$GLB,, | Performed by: OPHTHALMOLOGY

## 2025-03-26 PROCEDURE — 92202 OPSCPY EXTND ON/MAC DRAW: CPT | Mod: 59,S$GLB,, | Performed by: OPHTHALMOLOGY

## 2025-03-26 PROCEDURE — 1101F PT FALLS ASSESS-DOCD LE1/YR: CPT | Mod: CPTII,S$GLB,, | Performed by: OPHTHALMOLOGY

## 2025-03-26 PROCEDURE — 92134 CPTRZ OPH DX IMG PST SGM RTA: CPT | Mod: S$GLB,,, | Performed by: OPHTHALMOLOGY

## 2025-03-26 NOTE — PROGRESS NOTES
HPI    DLS: 2/12/2025 pt present for 1mo DFE/OCT poss VENECIA OD    Pt states feels like vision in OD has improved some.  Denies   pain/FOL/floaters    Last edited by Joan Seth on 3/26/2025  9:57 AM.         A/P    ICD-10-CM ICD-9-CM   1. Exudative age-related macular degeneration of right eye with active choroidal neovascularization  H35.3211 362.52     362.16   2. Early dry stage nonexudative age-related macular degeneration of left eye  H35.3121 362.51   3. Age-related nuclear cataract of both eyes  H25.13 366.16   4. Diabetes mellitus type 2 without retinopathy  E11.9 250.00   5. Choroidal neovascularization, right eye  H35.051 362.16       1. Exudative age-related macular degeneration of right eye with active choroidal neovascularization  Pt not sure when vision declined but thinks it was 2 mo prior to presentation    S/p VENECIA 2/12/25    Exam notable for active CNVM with improving IRF/SRF and heme  Plan: given active CNVM, recommend Avastin OD but wishes to wait 1-2 weeks     Visit today is associated with current or anticipated ongoing medical care related to this patients single serious condition/complex condition (macular degeneration)     2. Early dry stage nonexudative age-related macular degeneration of left eye  Mild dry changes  Plan: Observation for now, counseled on dry AMD findings and risk conversion to wet AMD  Amsler precautions  Recommend Antioxidants, lutein, omega 3, brown sunglasses (blue blockers).     3. Age-related nuclear cataract of both eyes  Mild NS, NVS  Plan: Observation Update Mrx prn - monocular precautions    4. Diabetes mellitus type 2 without retinopathy  Pcp Emigdio Freed MD - Recent notes reviewed  11/08/2024  7.1  A1C   No DR or DME OU  Plan: Observation  Recommend good blood pressure control, tight blood glucose control, and good cholesterol control     RTC 1-2 weeks Avastin OD        I saw and examined the patient and reviewed in detail the findings documented. The  final examination findings, image interpretations which have been independently interpreted, and plan as documented in the record represent my personal judgment and conclusions.    Jack Wang MD  Vitreoretinal Surgery   [facility]

## 2025-04-08 ENCOUNTER — TELEPHONE (OUTPATIENT)
Dept: OPHTHALMOLOGY | Facility: CLINIC | Age: 65
End: 2025-04-08
Payer: MEDICARE

## 2025-04-08 NOTE — TELEPHONE ENCOUNTER
----- Message from Hollie sent at 4/8/2025 12:47 PM CDT -----  Type:  Needs Medical AdviceWho Called: pt Symptoms (please be specific):  How long has patient had these symptoms:  Pharmacy name and phone #:  Would the patient rather a call back or a response via [facility]? [employer] Call Back Number: [phone] Information: pt states would like to r/s appt on the 9th    Please call back to advise. Thanks!

## 2025-04-23 DIAGNOSIS — J44.1 COPD EXACERBATION: ICD-10-CM

## 2025-04-23 RX ORDER — ALBUTEROL SULFATE 90 UG/1
2 INHALANT RESPIRATORY (INHALATION) EVERY 4 HOURS PRN
Qty: 54 G | Refills: 2 | Status: SHIPPED | OUTPATIENT
Start: 2025-04-23 | End: 2026-04-23

## 2025-04-23 NOTE — TELEPHONE ENCOUNTER
Care Due:                  Date            Visit Type   Department     Provider  --------------------------------------------------------------------------------                                EP -                              PRIMARY      [facility]    Emigdio Whalen  Last Visit: 05-      CARE ([facility])   MEDICINE       N[place]                              EP -                              PRIMARY      [facility]    Emigdio Londonon  Next Visit: 05-      CARE ([facility])   MEDICINE       Naccari                                                            Last  Test          Frequency    Reason                     Performed    Due Date  --------------------------------------------------------------------------------    HBA1C.......  6 months...  empagliflozin............  11- 05-    [facility] Embedded Care Due Messages. Reference number: [id number].   4/23/2025 1:36:42 PM CDT

## 2025-06-09 ENCOUNTER — RESULTS FOLLOW-UP (OUTPATIENT)
Dept: FAMILY MEDICINE | Facility: CLINIC | Age: 65
End: 2025-06-09

## 2025-06-09 ENCOUNTER — OFFICE VISIT (OUTPATIENT)
Dept: FAMILY MEDICINE | Facility: CLINIC | Age: 65
End: 2025-06-09
Payer: MEDICARE

## 2025-06-09 ENCOUNTER — HOSPITAL ENCOUNTER (OUTPATIENT)
Dept: RADIOLOGY | Facility: HOSPITAL | Age: 65
Discharge: HOME | End: 2025-06-09
Attending: STUDENT IN AN ORGANIZED HEALTH CARE EDUCATION/TRAINING PROGRAM
Payer: MEDICARE

## 2025-06-09 ENCOUNTER — HOSPITAL ENCOUNTER (OUTPATIENT)
Dept: RADIOLOGY | Facility: CLINIC | Age: 65
Discharge: HOME | End: 2025-06-09
Attending: STUDENT IN AN ORGANIZED HEALTH CARE EDUCATION/TRAINING PROGRAM
Payer: MEDICARE

## 2025-06-09 VITALS
RESPIRATION RATE: 16 BRPM | HEART RATE: 65 BPM | HEIGHT: 66 IN | WEIGHT: 171.5 LBS | OXYGEN SATURATION: 95 % | SYSTOLIC BLOOD PRESSURE: 134 MMHG | BODY MASS INDEX: 27.56 KG/M2 | TEMPERATURE: 98 F | DIASTOLIC BLOOD PRESSURE: 78 MMHG

## 2025-06-09 DIAGNOSIS — F11.20 CHRONIC NARCOTIC DEPENDENCE: ICD-10-CM

## 2025-06-09 DIAGNOSIS — E11.59 HYPERTENSION ASSOCIATED WITH DIABETES: ICD-10-CM

## 2025-06-09 DIAGNOSIS — J44.1 COPD EXACERBATION: ICD-10-CM

## 2025-06-09 DIAGNOSIS — F17.210 CIGARETTE NICOTINE DEPENDENCE WITHOUT COMPLICATION: ICD-10-CM

## 2025-06-09 DIAGNOSIS — I15.2 HYPERTENSION ASSOCIATED WITH DIABETES: ICD-10-CM

## 2025-06-09 DIAGNOSIS — E11.9 TYPE 2 DIABETES MELLITUS WITHOUT COMPLICATION, WITHOUT LONG-TERM CURRENT USE OF INSULIN: ICD-10-CM

## 2025-06-09 DIAGNOSIS — I50.20 HEART FAILURE WITH REDUCED EJECTION FRACTION: ICD-10-CM

## 2025-06-09 DIAGNOSIS — R80.9 MICROALBUMINURIA: ICD-10-CM

## 2025-06-09 DIAGNOSIS — Z12.5 ENCOUNTER FOR PROSTATE CANCER SCREENING: ICD-10-CM

## 2025-06-09 DIAGNOSIS — J44.1 COPD EXACERBATION: Primary | ICD-10-CM

## 2025-06-09 PROCEDURE — 3078F DIAST BP <80 MM HG: CPT | Mod: CPTII,S$GLB,, | Performed by: STUDENT IN AN ORGANIZED HEALTH CARE EDUCATION/TRAINING PROGRAM

## 2025-06-09 PROCEDURE — 4010F ACE/ARB THERAPY RXD/TAKEN: CPT | Mod: CPTII,S$GLB,, | Performed by: STUDENT IN AN ORGANIZED HEALTH CARE EDUCATION/TRAINING PROGRAM

## 2025-06-09 PROCEDURE — 3008F BODY MASS INDEX DOCD: CPT | Mod: CPTII,S$GLB,, | Performed by: STUDENT IN AN ORGANIZED HEALTH CARE EDUCATION/TRAINING PROGRAM

## 2025-06-09 PROCEDURE — 3288F FALL RISK ASSESSMENT DOCD: CPT | Mod: CPTII,S$GLB,, | Performed by: STUDENT IN AN ORGANIZED HEALTH CARE EDUCATION/TRAINING PROGRAM

## 2025-06-09 PROCEDURE — 1101F PT FALLS ASSESS-DOCD LE1/YR: CPT | Mod: CPTII,S$GLB,, | Performed by: STUDENT IN AN ORGANIZED HEALTH CARE EDUCATION/TRAINING PROGRAM

## 2025-06-09 PROCEDURE — 3075F SYST BP GE 130 - 139MM HG: CPT | Mod: CPTII,S$GLB,, | Performed by: STUDENT IN AN ORGANIZED HEALTH CARE EDUCATION/TRAINING PROGRAM

## 2025-06-09 PROCEDURE — 71271 CT THORAX LUNG CANCER SCR C-: CPT | Mod: TC

## 2025-06-09 PROCEDURE — 1159F MED LIST DOCD IN RCRD: CPT | Mod: CPTII,S$GLB,, | Performed by: STUDENT IN AN ORGANIZED HEALTH CARE EDUCATION/TRAINING PROGRAM

## 2025-06-09 PROCEDURE — G2211 COMPLEX E/M VISIT ADD ON: HCPCS | Mod: S$GLB,,, | Performed by: STUDENT IN AN ORGANIZED HEALTH CARE EDUCATION/TRAINING PROGRAM

## 2025-06-09 PROCEDURE — 71046 X-RAY EXAM CHEST 2 VIEWS: CPT | Mod: TC,FY,PO

## 2025-06-09 PROCEDURE — 99999 PR PBB SHADOW E&M-EST. PATIENT-LVL V: CPT | Mod: PBBFAC,,, | Performed by: STUDENT IN AN ORGANIZED HEALTH CARE EDUCATION/TRAINING PROGRAM

## 2025-06-09 PROCEDURE — 99214 OFFICE O/P EST MOD 30 MIN: CPT | Mod: S$GLB,,, | Performed by: STUDENT IN AN ORGANIZED HEALTH CARE EDUCATION/TRAINING PROGRAM

## 2025-06-09 PROCEDURE — 1160F RVW MEDS BY RX/DR IN RCRD: CPT | Mod: CPTII,S$GLB,, | Performed by: STUDENT IN AN ORGANIZED HEALTH CARE EDUCATION/TRAINING PROGRAM

## 2025-06-09 PROCEDURE — 71046 X-RAY EXAM CHEST 2 VIEWS: CPT | Mod: 26,,, | Performed by: RADIOLOGY

## 2025-06-09 PROCEDURE — 71271 CT THORAX LUNG CANCER SCR C-: CPT | Mod: 26,,, | Performed by: RADIOLOGY

## 2025-06-09 RX ORDER — AZITHROMYCIN 250 MG/1
TABLET, FILM COATED ORAL
Qty: 6 TABLET | Refills: 0 | Status: SHIPPED | OUTPATIENT
Start: 2025-06-09 | End: 2025-06-14

## 2025-06-09 RX ORDER — PREDNISONE 10 MG/1
10 TABLET ORAL DAILY
Qty: 5 TABLET | Refills: 0 | Status: SHIPPED | OUTPATIENT
Start: 2025-06-09 | End: 2025-06-09

## 2025-06-09 RX ORDER — PREDNISONE 20 MG/1
20 TABLET ORAL DAILY
Qty: 5 TABLET | Refills: 0 | Status: SHIPPED | OUTPATIENT
Start: 2025-06-09 | End: 2025-06-14

## 2025-06-09 RX ORDER — METFORMIN HYDROCHLORIDE 500 MG/1
TABLET, EXTENDED RELEASE ORAL
Qty: 420 TABLET | Refills: 3 | Status: SHIPPED | OUTPATIENT
Start: 2025-06-09 | End: 2025-10-07

## 2025-06-09 NOTE — PROGRESS NOTES
"Patient ID: Kal Dodd is a 65 y.o. male.    Chief Complaint: Medication Problem (Pt states, "having side effects from Jardience.  Causing rash on groin area") and Nasal Congestion    History of Present Illness    CHIEF COMPLAINT:  Patient presents today for regular checkup with complaints of cold symptoms for two weeks.    RESPIRATORY SYMPTOMS:  He reports worsening breathing difficulties with congestion, wheezing, and mild productive cough for the past two weeks. He experiences back pain associated with breathing. He denies fever or chest pain. He reports significant fatigue and weakness during this period, limiting his activities to mostly sitting at home.    DIABETES:  He was previously unaware of his diabetes diagnosis, believing he was "borderline." He takes Metformin daily for management but does not check blood sugar at home. He discontinued Jardiance after three days due to feeling unwell and developing a rash. Family history includes mother, sister, and daughter who passed away from diabetes at age 22.    OPHTHALMOLOGIC:  He received right eye injection approximately 1-1.5 months ago and reports vision deterioration with a spot in right eye. He missed follow-up injection due to significant pain from previous procedure.    MEDICATIONS:  He takes Lisinopril 10mg for blood pressure management. Previous attempt to increase dose to 40mg resulted in adverse effects, leading to return to 10mg dosing.    SOCIAL HISTORY:  He is a current smoker.      ROS:  General: -fever, -chills, +fatigue, -weight gain, -weight loss  Eyes: -vision changes, -redness, -discharge, +blind spots  ENT: -ear pain, +nasal congestion, -sore throat  Cardiovascular: -chest pain, -palpitations, -lower extremity edema  Respiratory: -cough, -shortness of breath, +wheezing, +productive cough, +difficulty breathing  Gastrointestinal: -abdominal pain, -nausea, -vomiting, -diarrhea, -constipation, -blood in stool  Genitourinary: -dysuria, " -hematuria, -frequency  Musculoskeletal: -joint pain, -muscle pain, +back pain  Skin: +rash, -lesion  Neurological: -headache, -dizziness, -numbness, -tingling  Psychiatric: -anxiety, -depression, -sleep difficulty         Physical Exam    General: No acute distress. Well-developed. Well-nourished.  Eyes: EOMI. Sclerae anicteric.  HENT: Normocephalic. Atraumatic. Nares patent. Moist oral mucosa.  Ears: Bilateral TMs clear. Bilateral EACs clear.  Cardiovascular: Regular rate. Regular rhythm. No murmurs. No rubs. No gallops. Normal S1, S2.  Respiratory: Normal respiratory effort. Clear to auscultation bilaterally. No rales. No rhonchi. Diffuse inspiratory and expiratory wheezes.  Abdomen: Soft. Non-tender. Non-distended. Normoactive bowel sounds.  Musculoskeletal: No  obvious deformity.  Extremities: No lower extremity edema.  Neurological: Alert & oriented x3. No slurred speech. Normal gait.  Psychiatric: Normal mood. Normal affect. Good insight. Good judgment.  Skin: Warm. Dry. No rash.         Assessment & Plan    J44.1 COPD exacerbation  E11.9 Type 2 diabetes mellitus without complication, without long-term current use of insulin  F17.210 Cigarette nicotine dependence without complication  R80.9 Microalbuminuria  Z12.5 Encounter for prostate cancer screening  E11.59, I15.2 Type 2 diabetes mellitus with other circulatory complications  F11.20 Chronic narcotic dependence  H35.3211 Exudative age-related macular degeneration, right eye, with active choroidal neovascularization  I10 Essential (primary) hypertension  M54.9 Dorsalgia, unspecified    IMPRESSION:  - Diagnosed COPD exacerbation based on diffuse inspiratory and expiratory wheezes and reported symptoms.  - Determined diabetes, not just borderline, based on previous prescriptions and lab results.  - Noted protein spillage in urine, indicating potential kidney issues related to diabetes.  - Considered lung cancer screening due to smoking history.  - Deferred  addressing BP medication concerns until after treating current respiratory issues.    COPD EXACERBATION:  - Explained COPD exacerbation as cause of current symptoms, describing it as lungs clamping down and making breathing difficult.  - Prescribed prednisone 20 mg daily for 5 days and azithromycin for 5 days.  - Scheduled follow up after completing prescribed medications to reassess condition.    TYPE 2 DIABETES MELLITUS WITHOUT COMPLICATION, WITHOUT LONG-TERM CURRENT USE OF INSULIN:  - Increased Metformin from daily to twice daily for 2 weeks, with goal to increase to two 2 mg tablets twice daily if tolerated.  - Discontinued Jardiance.  - Ordered labs for diabetes management.  - Scheduled follow up to review diabetes management and adjust medications as needed.    CIGARETTE NICOTINE DEPENDENCE WITHOUT COMPLICATION:  - Advised the patient to quit smoking.    MICROALBUMINURIA:  - Ordered urine test to check protein levels and monitor proteinuria.  - Previous urine test showed significant proteinuria, indicating kidney involvement.    ENCOUNTER FOR PROSTATE CANCER SCREENING:  - Ordered labs for prostate cancer screening.    TYPE 2 DIABETES MELLITUS WITH OTHER CIRCULATORY COMPLICATIONS:  - Patient has family history of diabetes (mother, sister, and daughter affected).  - Currently on Metformin but not checking glucose regularly.  - Patient expressed concerns about hypertension and medication dosage changes.    CHRONIC NARCOTIC DEPENDENCE:  - On suboxone    EXUDATIVE AGE-RELATED MACULAR DEGENERATION, RIGHT EYE, WITH ACTIVE CHOROIDAL NEOVASCULARIZATION:  - Patient reports worsening vision and a spot in the right eye, with condition deteriorating due to missed treatment.  - Advised to schedule another appointment for eye treatment promptly.    ESSENTIAL (PRIMARY) HYPERTENSION:  - Scheduled follow-up for blood pressure medication review after recovery from current illness.    DORSALGIA, UNSPECIFIED:  - Patient reports  "back pain, possibly related to pulmonary issues.    LIFESTYLE CHANGES:  - Ordered CT Chest for lung cancer screening.  - Discussed potential for incidental findings during screening, which may lead to additional tests.          Kal Pat" was seen today for medication problem and nasal congestion.    Diagnoses and all orders for this visit:    COPD exacerbation  -     Discontinue: predniSONE (DELTASONE) 10 MG tablet; Take 1 tablet (10 mg total) by mouth once daily. for 5 days  -     azithromycin (Z-RAUL) 250 MG tablet; Take 2 tablets by mouth on day 1; Take 1 tablet by mouth on days 2-5  -     predniSONE (DELTASONE) 20 MG tablet; Take 1 tablet (20 mg total) by mouth once daily. for 5 days  -     X-Ray Chest PA And Lateral; Future    Type 2 diabetes mellitus without complication, without long-term current use of insulin  -     metFORMIN (GLUCOPHAGE-XR) 500 MG ER 24hr tablet; Take 1 tablet (500 mg total) by mouth 2 (two) times daily with meals for 30 days, THEN 2 tablets (1,000 mg total) 2 (two) times daily with meals.  -     Lipid Panel; Future  -     Hemoglobin A1C; Future  -     Comprehensive Metabolic Panel; Future  -     CBC Auto Differential; Future    Microalbuminuria  -     Protein/Creatinine Ratio, Urine; Future    Encounter for prostate cancer screening  -     PSA, Screening; Future    Cigarette nicotine dependence without complication  -     CT Chest Lung Screening Low Dose; Future    Hypertension associated with diabetes    Chronic narcotic dependence    Heart failure with reduced ejection fraction  Not currently decompensated. Will need echo, cardiology referral  We will need to start trying to get him on more GDMT.           No follow-ups on file.    This note was generated with the assistance of ambient listening technology. Verbal consent was obtained by the patient and accompanying visitor(s) for the recording of patient appointment to facilitate this note. I attest to having reviewed and edited the " generated note for accuracy, though some syntax or spelling errors may persist. Please contact the author of this note for any clarification.

## 2025-06-16 ENCOUNTER — OFFICE VISIT (OUTPATIENT)
Dept: FAMILY MEDICINE | Facility: CLINIC | Age: 65
End: 2025-06-16
Payer: MEDICARE

## 2025-06-16 VITALS
SYSTOLIC BLOOD PRESSURE: 128 MMHG | WEIGHT: 170 LBS | HEART RATE: 57 BPM | HEIGHT: 66 IN | BODY MASS INDEX: 27.32 KG/M2 | OXYGEN SATURATION: 97 % | DIASTOLIC BLOOD PRESSURE: 68 MMHG

## 2025-06-16 DIAGNOSIS — E11.9 TYPE 2 DIABETES MELLITUS WITHOUT COMPLICATION, WITHOUT LONG-TERM CURRENT USE OF INSULIN: ICD-10-CM

## 2025-06-16 DIAGNOSIS — I15.2 HYPERTENSION ASSOCIATED WITH DIABETES: Primary | ICD-10-CM

## 2025-06-16 DIAGNOSIS — Z12.11 ENCOUNTER FOR SCREENING FOR MALIGNANT NEOPLASM OF COLON: ICD-10-CM

## 2025-06-16 DIAGNOSIS — J44.1 COPD EXACERBATION: ICD-10-CM

## 2025-06-16 DIAGNOSIS — E11.59 HYPERTENSION ASSOCIATED WITH DIABETES: Primary | ICD-10-CM

## 2025-06-16 DIAGNOSIS — I50.20 NYHA CLASS 2 HEART FAILURE WITH REDUCED EJECTION FRACTION: ICD-10-CM

## 2025-06-16 PROCEDURE — 1126F AMNT PAIN NOTED NONE PRSNT: CPT | Mod: CPTII,S$GLB,, | Performed by: PHYSICIAN ASSISTANT

## 2025-06-16 PROCEDURE — 1101F PT FALLS ASSESS-DOCD LE1/YR: CPT | Mod: CPTII,S$GLB,, | Performed by: PHYSICIAN ASSISTANT

## 2025-06-16 PROCEDURE — 1160F RVW MEDS BY RX/DR IN RCRD: CPT | Mod: CPTII,S$GLB,, | Performed by: PHYSICIAN ASSISTANT

## 2025-06-16 PROCEDURE — 3051F HG A1C>EQUAL 7.0%<8.0%: CPT | Mod: CPTII,S$GLB,, | Performed by: PHYSICIAN ASSISTANT

## 2025-06-16 PROCEDURE — 3078F DIAST BP <80 MM HG: CPT | Mod: CPTII,S$GLB,, | Performed by: PHYSICIAN ASSISTANT

## 2025-06-16 PROCEDURE — G2211 COMPLEX E/M VISIT ADD ON: HCPCS | Mod: S$GLB,,, | Performed by: PHYSICIAN ASSISTANT

## 2025-06-16 PROCEDURE — 99214 OFFICE O/P EST MOD 30 MIN: CPT | Mod: S$GLB,,, | Performed by: PHYSICIAN ASSISTANT

## 2025-06-16 PROCEDURE — 1159F MED LIST DOCD IN RCRD: CPT | Mod: CPTII,S$GLB,, | Performed by: PHYSICIAN ASSISTANT

## 2025-06-16 PROCEDURE — 4010F ACE/ARB THERAPY RXD/TAKEN: CPT | Mod: CPTII,S$GLB,, | Performed by: PHYSICIAN ASSISTANT

## 2025-06-16 PROCEDURE — 99999 PR PBB SHADOW E&M-EST. PATIENT-LVL IV: CPT | Mod: PBBFAC,,, | Performed by: PHYSICIAN ASSISTANT

## 2025-06-16 PROCEDURE — 3288F FALL RISK ASSESSMENT DOCD: CPT | Mod: CPTII,S$GLB,, | Performed by: PHYSICIAN ASSISTANT

## 2025-06-16 PROCEDURE — 3008F BODY MASS INDEX DOCD: CPT | Mod: CPTII,S$GLB,, | Performed by: PHYSICIAN ASSISTANT

## 2025-06-16 PROCEDURE — 3074F SYST BP LT 130 MM HG: CPT | Mod: CPTII,S$GLB,, | Performed by: PHYSICIAN ASSISTANT

## 2025-06-16 NOTE — ASSESSMENT & PLAN NOTE
Current A1C is 7.2, with goal of 7 or less. Patient is tolerating the increased dose of Metformin (500 mg) twice daily well with no issues reported. Discussed importance of diet in diabetes management. Plan to increase metformin to goal of 1000 mg twice daily as tolerated. Decided to follow-up in 3 months to reassess diabetes management.

## 2025-06-16 NOTE — ASSESSMENT & PLAN NOTE
Patient reports feeling 80% better with improved oxygen levels since last visit, though still experiencing some breathing issues. No significant wheezing detected upon exam and CT/chest xray confirmed absence of pneumonia. Patient is currently using albuterol every day or every other day. Continue current COPD inhaler regimen: maintenance inhaler twice daily and albuterol as needed. Patient instructed to monitor symptoms and contact office if COPD symptoms worsen or do not continue to improve.

## 2025-06-16 NOTE — ASSESSMENT & PLAN NOTE
Blood pressure is good today and at last visit, though patient reports occasional elevations to approximately 170/80 or 170/60 at home however is unsure. Current regimen of Lisinopril 10 mg appears effective.  Educated patient on target BP of less than 140/90. Patient instructed to monitor BP at home twice weekly, record readings, and bring log to next appointment.

## 2025-06-16 NOTE — PROGRESS NOTES
"OFFICE VISIT   6/16/2025    Patient ID: Kal Dodd is a 65 y.o. male    SUBJECTIVE:  Follow-up      HPI/ROS:  History of Present Illness    Patient presents today for follow up of COPD exacerbation from last week     He reports improvement since starting treatment for COPD exacerbation. He has completed the prescribed antibiotics and prednisone. He confirms compliance with twice daily inhaler use. He reports variable home blood pressure readings with systolic measurements reaching 180 however is unsure. He continues lisinopril 10 mg daily.   A1C was most recently 7.2. He takes metformin 500 mg twice daily but does not check blood sugar regularly. He is tolerating metformin twice daily. He discontinued previously Jardiance due to side effects.   He reports eye issues and significant discomfort with previous eye injections.          Review of Systems   Constitutional:  Negative for chills, fatigue and fever.   HENT:  Negative for congestion and sore throat.    Respiratory:  Positive for cough. Negative for shortness of breath.    Cardiovascular:  Negative for chest pain and palpitations.   Gastrointestinal:  Negative for nausea and vomiting.       Past Medical History:   Diagnosis Date    Coronary artery disease 4/26/12    "massive heart attack"    Heart attack     Hyperlipidemia     Hypertension        Social History[1]    Past Surgical History:   Procedure Laterality Date    CORONARY STENT PLACEMENT  4/12       OBJECTIVE:    /68 (BP Location: Right arm, Patient Position: Sitting)   Pulse (!) 57   Ht 5' 6" (1.676 m)   Wt 77.1 kg (169 lb 15.6 oz)   SpO2 97%   BMI 27.43 kg/m²     Current Medications[2]    Physical Exam  Constitutional:       General: He is not in acute distress.     Appearance: Normal appearance. He is not ill-appearing.   HENT:      Head: Normocephalic and atraumatic.      Right Ear: External ear normal.      Left Ear: External ear normal.      Nose: Nose normal.      Mouth/Throat: "      Mouth: Mucous membranes are moist.      Pharynx: Oropharynx is clear.   Eyes:      Extraocular Movements: Extraocular movements intact.      Conjunctiva/sclera: Conjunctivae normal.   Cardiovascular:      Rate and Rhythm: Normal rate and regular rhythm.      Pulses: Normal pulses.      Heart sounds: Normal heart sounds.   Pulmonary:      Effort: Pulmonary effort is normal. No respiratory distress.      Breath sounds: Normal breath sounds.   Musculoskeletal:         General: No swelling or deformity. Normal range of motion.      Cervical back: Normal range of motion and neck supple.   Skin:     General: Skin is warm and dry.   Neurological:      General: No focal deficit present.      Mental Status: He is alert and oriented to person, place, and time. Mental status is at baseline.   Psychiatric:         Mood and Affect: Mood normal.         Behavior: Behavior normal.         Thought Content: Thought content normal.         Judgment: Judgment normal.         Assessment/Plan:  Assessment & Plan    - COPD exacerbation from last week improving in symptoms and oxygen levels.  - CT showed no pneumonia.  - Diabetes management: A1C at 7.2, slightly above goal of 7 or less.  - BP well-controlled in office on lisinopril 10 mg daily.  - Recommend colon cancer screening using provided FIT kit.  - Continued albuterol usage with expected gradual improvement in COPD symptoms over time.    FOLLOW-UP AND ADDITIONAL TESTS:  - Patient to complete and return FIT kit for colon cancer screening.  - Labs ordered to be completed before next follow-up visit.          Problem List Items Addressed This Visit       NYHA class 2 heart failure with reduced ejection fraction    Upcoming echo and cardiology appointment scheduled.          COPD exacerbation    Patient reports feeling 80% better with improved oxygen levels since last visit, though still experiencing some breathing issues. No significant wheezing detected upon exam and CT/chest  xray confirmed absence of pneumonia. Patient is currently using albuterol every day or every other day. Continue current COPD inhaler regimen: maintenance inhaler twice daily and albuterol as needed. Patient instructed to monitor symptoms and contact office if COPD symptoms worsen or do not continue to improve.         Hypertension associated with diabetes - Primary    Blood pressure is good today and at last visit, though patient reports occasional elevations to approximately 170/80 or 170/60 at home however is unsure. Current regimen of Lisinopril 10 mg appears effective.  Educated patient on target BP of less than 140/90. Patient instructed to monitor BP at home twice weekly, record readings, and bring log to next appointment.         Relevant Orders    Comprehensive Metabolic Panel    Type 2 diabetes mellitus without complication, without long-term current use of insulin    Current A1C is 7.2, with goal of 7 or less. Patient is tolerating the increased dose of Metformin (500 mg) twice daily well with no issues reported. Discussed importance of diet in diabetes management. Plan to increase metformin to goal of 1000 mg twice daily as tolerated. Decided to follow-up in 3 months to reassess diabetes management.          Relevant Orders    Comprehensive Metabolic Panel    Hemoglobin A1C     Other Visit Diagnoses         Encounter for screening for malignant neoplasm of colon        Relevant Orders    Fecal Immunochemical Test (iFOBT)            Patient verbalizes understanding of instructions and healthcare topics reviewed. All of patient's questions were answered.  Patient encouraged to contact office if other questions arise.    Health Maintenance Due   Topic Date Due    TETANUS VACCINE  Never done    Colorectal Cancer Screening  Never done    Shingles Vaccine (1 of 2) Never done    Pneumococcal Vaccines (Age 50+) (2 of 2 - PCV) 10/24/2013    RSV Vaccine (Age 60+ and Pregnant patients) (1 - Risk 60-74 years 1-dose  series) Never done    Abdominal Aortic Aneurysm Screening  Never done    Foot Exam  05/10/2024    COVID-19 Vaccine (4 - 2024-25 season) 09/01/2024       Follow up in about 3 months (around 9/16/2025).    This note was generated with the assistance of ambient listening technology. Verbal consent was obtained by the patient and accompanying visitor(s) for the recording of patient appointment to facilitate this note. I attest to having reviewed and edited the generated note for accuracy, though some syntax or spelling errors may persist. Please contact the author of this note for any clarification.    Reviewed complexities inherent to evaluation and management of this patient's acute and chronic problems to deliver optimal long-term care to the patient at time of visit.      Christine Renner PA-C  Family Medicine Physician Assistant          [1]   Social History  Tobacco Use    Smoking status: Every Day     Current packs/day: 1.00     Types: Cigarettes    Smokeless tobacco: Never   Substance Use Topics    Alcohol use: No    Drug use: No     Types: Marijuana   [2]   Current Outpatient Medications:     albuterol (PROVENTIL HFA) 90 mcg/actuation inhaler, Inhale 2 puffs into the lungs every 4 (four) hours as needed for Wheezing. Rescue, Disp: 54 g, Rfl: 2    aspirin (ECOTRIN) 81 MG EC tablet, Take 81 mg by mouth once daily.  , Disp: , Rfl:     clonazePAM (KLONOPIN) 1 MG tablet, Take 1 mg by mouth nightly as needed., Disp: , Rfl:     fenofibrate 160 MG Tab, Take 1 tablet (160 mg total) by mouth once daily., Disp: 90 tablet, Rfl: 3    fluticasone-salmeterol diskus inhaler 100-50 mcg, Inhale 1 puff into the lungs 2 (two) times a day. Controller, Disp: 180 each, Rfl: 3    lisinopriL 10 MG tablet, Take 1 tablet (10 mg total) by mouth once daily., Disp: 90 tablet, Rfl: 4    metFORMIN (GLUCOPHAGE-XR) 500 MG ER 24hr tablet, Take 1 tablet (500 mg total) by mouth 2 (two) times daily with meals for 30 days, THEN 2 tablets  (1,000 mg total) 2 (two) times daily with meals., Disp: 420 tablet, Rfl: 3    naloxone (NARCAN) 4 mg/actuation Zarate, , Disp: , Rfl:     rosuvastatin (CRESTOR) 10 MG tablet, Take 1 tablet (10 mg total) by mouth once daily., Disp: 90 tablet, Rfl: 4    SUBOXONE 8-2 mg, Place under the tongue., Disp: , Rfl:     nitroGLYCERIN (NITROSTAT) 0.4 MG SL tablet, Place 1 tablet (0.4 mg total) under the tongue every 5 (five) minutes as needed for Chest pain., Disp: 25 tablet, Rfl: 2

## 2025-07-09 DIAGNOSIS — J44.1 COPD EXACERBATION: ICD-10-CM

## 2025-07-09 RX ORDER — ALBUTEROL SULFATE 90 UG/1
2 INHALANT RESPIRATORY (INHALATION) EVERY 4 HOURS PRN
Qty: 54 G | Refills: 2 | Status: SHIPPED | OUTPATIENT
Start: 2025-07-09 | End: 2026-07-09

## 2025-07-09 NOTE — TELEPHONE ENCOUNTER
LOV 6/16/25  LAB 6/9/25  Next OV 9/16/25      Copied from CRM #[id number]. Topic: Medications - Medication Refill  >> Jul 9, 2025  3:52 PM Rima wrote:  Type:  RX Refill Request    Who Called:   PHARM  Refill or New Rx:   REFILL  RX Name and Strength:  albuterol (PROVENTIL HFA) 90 mcg/actuation inhaler   How is the patient currently taking it? (ex. 1XDay):  AS ORDERED  Is this a 30 day or 90 day RX:  90  Preferred Pharmacy with phone number:    [facility] - [place], [place] - [address]  [address]  [place] [address]  Phone: [phone] Fax: [phone]      Local or Mail Order:  LOCAL  Ordering Provider:  BRETT  Best Call Back Number:  [phone]  Additional Information:

## 2025-07-09 NOTE — TELEPHONE ENCOUNTER
No care due was identified.  [facility] Embedded Care Due Messages. Reference number: [id number].   7/09/2025 3:57:09 PM CDT

## 2025-07-30 ENCOUNTER — TELEPHONE (OUTPATIENT)
Dept: OPHTHALMOLOGY | Facility: CLINIC | Age: 65
End: 2025-07-30
Payer: MEDICARE

## 2025-07-30 NOTE — TELEPHONE ENCOUNTER
Copied from CRM #[id number]. Topic: General Inquiry - Patient Advice  >> Jul 30, 2025  1:15 PM Bianca wrote:  Type: Needs Medical Advice  Who Called:  pt     Best Call Back Number: [phone] (home)    Additional Information: pt requesting call back in regards to speaking with office before getting injections please advise

## 2025-08-27 ENCOUNTER — OFFICE VISIT (OUTPATIENT)
Dept: OPHTHALMOLOGY | Facility: CLINIC | Age: 65
End: 2025-08-27
Payer: MEDICARE

## 2025-08-27 DIAGNOSIS — E11.9 DIABETES MELLITUS TYPE 2 WITHOUT RETINOPATHY: ICD-10-CM

## 2025-08-27 DIAGNOSIS — H25.13 AGE-RELATED NUCLEAR CATARACT OF BOTH EYES: ICD-10-CM

## 2025-08-27 DIAGNOSIS — H35.3231 EXUDATIVE AGE-RELATED MACULAR DEGENERATION OF BOTH EYES WITH ACTIVE CHOROIDAL NEOVASCULARIZATION: Primary | ICD-10-CM

## 2025-08-27 DIAGNOSIS — H43.813 VITREOUS DEGENERATION OF BOTH EYES: ICD-10-CM

## 2025-08-27 PROCEDURE — 99999 PR PBB SHADOW E&M-EST. PATIENT-LVL III: CPT | Mod: PBBFAC,,, | Performed by: OPHTHALMOLOGY

## 2025-09-03 ENCOUNTER — OFFICE VISIT (OUTPATIENT)
Dept: OPHTHALMOLOGY | Facility: CLINIC | Age: 65
End: 2025-09-03
Payer: MEDICARE

## 2025-09-03 DIAGNOSIS — H35.3231 EXUDATIVE AGE-RELATED MACULAR DEGENERATION OF BOTH EYES WITH ACTIVE CHOROIDAL NEOVASCULARIZATION: Primary | ICD-10-CM

## 2025-09-03 PROCEDURE — 99999 PR PBB SHADOW E&M-EST. PATIENT-LVL II: CPT | Mod: PBBFAC,,, | Performed by: OPHTHALMOLOGY
